# Patient Record
Sex: MALE | Race: WHITE | NOT HISPANIC OR LATINO | Employment: FULL TIME | URBAN - METROPOLITAN AREA
[De-identification: names, ages, dates, MRNs, and addresses within clinical notes are randomized per-mention and may not be internally consistent; named-entity substitution may affect disease eponyms.]

---

## 2018-04-11 ENCOUNTER — TELEPHONE (OUTPATIENT)
Dept: FAMILY MEDICINE | Facility: CLINIC | Age: 43
End: 2018-04-11

## 2018-07-02 ENCOUNTER — OFFICE VISIT (OUTPATIENT)
Dept: FAMILY MEDICINE | Facility: CLINIC | Age: 43
End: 2018-07-02
Payer: COMMERCIAL

## 2018-07-02 VITALS
OXYGEN SATURATION: 97 % | SYSTOLIC BLOOD PRESSURE: 113 MMHG | WEIGHT: 222 LBS | TEMPERATURE: 98.5 F | BODY MASS INDEX: 30.07 KG/M2 | DIASTOLIC BLOOD PRESSURE: 67 MMHG | HEART RATE: 69 BPM | HEIGHT: 72 IN

## 2018-07-02 DIAGNOSIS — M54.2 NECK PAIN: Primary | ICD-10-CM

## 2018-07-02 DIAGNOSIS — I86.1 BILATERAL VARICOCELES: ICD-10-CM

## 2018-07-02 PROCEDURE — 99214 OFFICE O/P EST MOD 30 MIN: CPT | Performed by: FAMILY MEDICINE

## 2018-07-02 RX ORDER — OMEPRAZOLE 40 MG/1
40 CAPSULE, DELAYED RELEASE ORAL
COMMUNITY
Start: 2015-03-24 | End: 2018-07-02

## 2018-07-02 RX ORDER — LISDEXAMFETAMINE DIMESYLATE 40 MG/1
40 CAPSULE ORAL EVERY MORNING
COMMUNITY
Start: 2017-02-14 | End: 2018-10-12 | Stop reason: SDUPTHER

## 2018-07-02 RX ORDER — SILDENAFIL 100 MG/1
100 TABLET, FILM COATED ORAL DAILY PRN
Qty: 5 TABLET | Refills: 0 | Status: SHIPPED | OUTPATIENT
Start: 2018-07-02 | End: 2018-07-12 | Stop reason: SDUPTHER

## 2018-07-02 ASSESSMENT — ENCOUNTER SYMPTOMS
ABDOMINAL PAIN: 0
DYSURIA: 0
COUGH: 0
CHILLS: 0
NAUSEA: 0
ADENOPATHY: 1
RHINORRHEA: 0
VOMITING: 0
FREQUENCY: 0
FEVER: 0
DIARRHEA: 1
HEMATURIA: 0
SORE THROAT: 0
SHORTNESS OF BREATH: 0
FATIGUE: 1

## 2018-07-02 ASSESSMENT — PAIN SCALES - GENERAL: PAINLEVEL: 3

## 2018-07-02 NOTE — PROGRESS NOTES
[facility] Medicine  [address] [place] [address]  [phone]       Reason for visit:   Chief Complaint   Patient presents with   • Groin Swelling     x 2 days   • Neck Swelling     x 1 week      HPI   Samuel Koehler is a 43 y.o. male who presents with swollen glands   - Swollen Glands  Noticed that his neck was swollen about 7-10 days ago  Left side  Feels like it has gotten a little bigger  Tender  Notes left ear ache  Denies URI symptoms, fevers, chills  Noticed swelling in groin about 2 days ago - scrotum  R side  Tender  Denies any urinary symptoms  One episode of diarrhea yesterday   History reviewed. No pertinent past medical history.  History reviewed. No pertinent surgical history.  Social History     Social History   • Marital status:      Spouse name: N/A   • Number of children: N/A   • Years of education: N/A     Occupational History   • Not on file.     Social History Main Topics   • Smoking status: Light Tobacco Smoker   • Smokeless tobacco: Never Used   • Alcohol use No   • Drug use: No   • Sexual activity: Not on file     Other Topics Concern   • Not on file     Social History Narrative    Do you wear your seatbelt? Yes    Do you have smoke detector in your home? Yes    Do you have a carbon monoxide detector in your home? 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          History reviewed. No pertinent family history.  Penicillins  Current Outpatient Prescriptions   Medication Sig Dispense Refill   • lisdexamfetamine (VYVANSE) 40 mg capsule Take 40 mg by mouth every morning.         No current facility-administered medications for this visit.        Review of Systems   Constitutional: Positive for fatigue. Negative for chills and fever.   HENT: Negative for congestion, rhinorrhea and sore throat.    Respiratory: Negative for cough and shortness of breath.    Gastrointestinal: Positive for diarrhea. Negative for  abdominal pain, nausea and vomiting.   Genitourinary: Positive for scrotal swelling and testicular pain. Negative for discharge, dysuria, frequency and hematuria.   Skin: Negative for rash.   Hematological: Positive for adenopathy.     Objective   Vitals:    07/02/18 1539   BP: 113/67   Pulse: 69   Temp: 36.9 °C (98.5 °F)   SpO2: 97%       Physical Exam   Constitutional: He appears well-developed and well-nourished.   HENT:   Right Ear: Tympanic membrane and ear canal normal.   Left Ear: Tympanic membrane and ear canal normal.   Nose: No mucosal edema or rhinorrhea. Right sinus exhibits no maxillary sinus tenderness. Left sinus exhibits no maxillary sinus tenderness.   Mouth/Throat: Oropharynx is clear and moist and mucous membranes are normal. Tonsils are 1+ on the right. Tonsils are 1+ on the left. No tonsillar exudate.   Neck: Normal range of motion.   Mild left neck tenderness under jaw line   Pulmonary/Chest: Effort normal and breath sounds normal. No respiratory distress. He has no decreased breath sounds. He has no wheezes. He has no rhonchi. He has no rales.   Abdominal: Hernia confirmed negative in the right inguinal area and confirmed negative in the left inguinal area.   Genitourinary: Penis normal. Right testis shows tenderness. Right testis shows no mass. Left testis shows no mass. Circumcised. No penile erythema or penile tenderness. No discharge found.   Genitourinary Comments: R > L varicocele   Lymphadenopathy:        Right cervical: No superficial cervical, no deep cervical and no posterior cervical adenopathy present.       Left cervical: No superficial cervical, no deep cervical and no posterior cervical adenopathy present.   Skin: He is not diaphoretic.       Procedures    Lab Results   Component Value Date    WBC 4.6 12/18/2015    HGB 13.6 12/18/2015    HCT 42.6 12/18/2015     12/18/2015    CHOL 145 12/18/2015    TRIG 54 12/18/2015    HDL 41 12/18/2015     (H) 12/18/2015    AST  305 (H) 12/18/2015     12/18/2015    K 4.8 12/18/2015     12/18/2015    CREATININE 0.90 12/18/2015    BUN 12 12/18/2015    CO2 23 12/18/2015    TSH 1.230 12/18/2015    HGBA1C 5.4 12/18/2015         Assessment   Problem List Items Addressed This Visit     None      Visit Diagnoses     Neck pain    -  Primary    New Problem  Noticed about a week ago  R sided only  Tender to touch  No URI sxs  No LNs on exam  Mild tenderness    Bilateral varicoceles        New Problem  x 2 days  R > L  Tender  Feels swollen  No other  symptoms  Varicoceles, R > L  U/S  Motrin      ED --> Rx for Viagra        Suresh Holland MD  7/2/2018

## 2018-07-02 NOTE — PATIENT INSTRUCTIONS
I did not feel any swollen lymph nodes in your neck  Could just be from overwork  Please let me know if your symptoms continue over the next 10-14 days  Please schedule your Ultrasound  Motrin/Tylenol for pain  Viagra as needed

## 2018-07-12 ENCOUNTER — TELEPHONE (OUTPATIENT)
Dept: FAMILY MEDICINE | Facility: CLINIC | Age: 43
End: 2018-07-12

## 2018-07-12 RX ORDER — SILDENAFIL 100 MG/1
100 TABLET, FILM COATED ORAL DAILY PRN
Qty: 30 TABLET | Refills: 0 | Status: SHIPPED | OUTPATIENT
Start: 2018-07-12 | End: 2019-03-21 | Stop reason: SDUPTHER

## 2018-10-12 ENCOUNTER — TELEPHONE (OUTPATIENT)
Dept: FAMILY MEDICINE | Facility: CLINIC | Age: 43
End: 2018-10-12

## 2018-10-12 RX ORDER — LISDEXAMFETAMINE DIMESYLATE 40 MG/1
40 CAPSULE ORAL EVERY MORNING
Qty: 6 CAPSULE | Refills: 0 | Status: SHIPPED | OUTPATIENT
Start: 2018-10-12 | End: 2020-01-10 | Stop reason: SDUPTHER

## 2018-10-12 NOTE — TELEPHONE ENCOUNTER
Patient is out of [facility]. His psychiatrist will not be back in the office until Thursday. Can you call him in a prescription for 6 pills?  If you need to talk to the psy office their number is [phone] and he spoke with Saskia. Call patient if this is ok. Send to MA

## 2019-03-21 RX ORDER — SILDENAFIL 100 MG/1
TABLET, FILM COATED ORAL
Qty: 30 TABLET | Refills: 0 | Status: SHIPPED | OUTPATIENT
Start: 2019-03-21 | End: 2023-04-25

## 2019-09-24 ENCOUNTER — TELEPHONE (OUTPATIENT)
Dept: FAMILY MEDICINE | Facility: CLINIC | Age: 44
End: 2019-09-24

## 2019-10-04 ENCOUNTER — OFFICE VISIT (OUTPATIENT)
Dept: FAMILY MEDICINE | Facility: CLINIC | Age: 44
End: 2019-10-04
Payer: COMMERCIAL

## 2019-10-04 VITALS
DIASTOLIC BLOOD PRESSURE: 67 MMHG | TEMPERATURE: 98.7 F | OXYGEN SATURATION: 97 % | BODY MASS INDEX: 29.8 KG/M2 | HEIGHT: 72 IN | WEIGHT: 220 LBS | HEART RATE: 60 BPM | SYSTOLIC BLOOD PRESSURE: 127 MMHG

## 2019-10-04 DIAGNOSIS — Z00.00 ENCOUNTER FOR GENERAL ADULT MEDICAL EXAMINATION WITHOUT ABNORMAL FINDINGS: Primary | ICD-10-CM

## 2019-10-04 PROBLEM — F90.9 ADHD: Status: ACTIVE | Noted: 2019-10-04

## 2019-10-04 PROCEDURE — 90471 IMMUNIZATION ADMIN: CPT | Performed by: FAMILY MEDICINE

## 2019-10-04 PROCEDURE — 90686 IIV4 VACC NO PRSV 0.5 ML IM: CPT | Performed by: FAMILY MEDICINE

## 2019-10-04 PROCEDURE — 99396 PREV VISIT EST AGE 40-64: CPT | Mod: 25 | Performed by: FAMILY MEDICINE

## 2019-10-04 ASSESSMENT — ENCOUNTER SYMPTOMS
BACK PAIN: 0
ABDOMINAL PAIN: 0
ADENOPATHY: 0
FEVER: 0
NUMBNESS: 0
RHINORRHEA: 0
FREQUENCY: 0
NAUSEA: 0
SHORTNESS OF BREATH: 0
CONSTIPATION: 0
PALPITATIONS: 0
SORE THROAT: 0
CHILLS: 0
DIZZINESS: 0
VOMITING: 0
DIARRHEA: 0
ARTHRALGIAS: 0
COUGH: 0
WEAKNESS: 0
BLOOD IN STOOL: 0
DYSURIA: 0

## 2019-10-04 NOTE — PROGRESS NOTES
[facility] Medicine  [address] [place] [id number]  [phone]       Reason for visit:   Chief Complaint   Patient presents with   • Annual Exam      HPI   Samuel Koehler is a 44 y.o. male who presents with CPX   Updates Yes Quit smoking a week ago - nicotine patch for a day. Cold turkey after that.    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     Social History Main Topics   • Smoking status: Former Smoker     Quit date: 9/25/2019   • Smokeless tobacco: Never Used   • Alcohol use Yes      Comment: few/mo   • Drug use: No   • Sexual activity: Yes     Partners: Female     Other Topics Concern   • Not on file     Social History Narrative    Do you wear your seatbelt? Yes    Do you have smoke detector in your home? Yes    Do you have a carbon monoxide detector in your home? 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          Family History   Problem Relation Age of Onset   • Diabetes Biological Father    • Heart disease Maternal Grandmother      Penicillins  Current Outpatient Prescriptions   Medication Sig Dispense Refill   • lisdexamfetamine (VYVANSE) 40 mg capsule Take 1 capsule (40 mg total) by mouth every morning. 6 capsule 0   • multivit-mins/iron/folic/lycop (CENTRUM MEN ORAL) Take 1 tablet by mouth daily.     • sildenafil (VIAGRA) 100 mg tablet TAKE 1 TABLET(100 MG) BY  MOUTH DAILY AS NEEDED FOR ERECTILE DYSFUNCTION (Patient not taking: Reported on 10/4/2019) 30 tablet 0     No current facility-administered medications for this visit.        Review of Systems   Constitutional: Negative for chills and fever.   HENT: Negative for congestion, rhinorrhea and sore throat.    Respiratory: Negative for cough and shortness of breath.    Cardiovascular: Negative for chest pain and palpitations.   Gastrointestinal: Negative for abdominal pain, blood in stool, constipation, diarrhea, nausea and vomiting.   Genitourinary: Negative for dysuria, frequency and testicular pain.   Musculoskeletal: Negative for arthralgias and back pain.   Skin: Negative for rash.   Allergic/Immunologic: Negative for environmental allergies and food allergies.   Neurological: Negative for dizziness, weakness and numbness.   Hematological: Negative for adenopathy.     Objective   Vitals:    10/04/19 1348   BP: 127/67   BP Location: Left upper arm   Patient Position: Sitting   Pulse: 60   Temp: 37.1 °C (98.7 °F)   TempSrc: Oral   SpO2: 97%   Weight: 99.8 kg (220 lb)   Height: 1.829 m (6')       Physical Exam   Constitutional: He is oriented to person, place, and time. He appears well-developed and well-nourished.   HENT:   Head: Normocephalic and atraumatic.   Right Ear: Tympanic membrane and ear canal normal.   Left Ear: Tympanic membrane and ear canal normal.   Nose: Nose normal.   Mouth/Throat: Oropharynx is clear and moist and mucous membranes are normal.   Eyes: Pupils are equal, round, and reactive to light. Conjunctivae are normal.   Neck: No thyroid mass and no thyromegaly present.   Cardiovascular: Normal rate, regular rhythm, S1 normal, S2 normal and normal pulses.  Exam reveals no gallop and no friction rub.    No murmur heard.  Pulses:       Radial pulses are 2+ on the right side, and 2+ on the left side.   Pulmonary/Chest: Effort normal and breath sounds normal. He has no wheezes. He has no rhonchi. He  has no rales.   Abdominal: Soft. Normal appearance and bowel sounds are normal. There is no tenderness. There is no rebound and no guarding.   Musculoskeletal: Normal range of motion.   Lymphadenopathy:     He has no cervical adenopathy.   Neurological: He is alert and oriented to person, place, and time. He has normal strength. No cranial nerve deficit.   Psychiatric: He has a normal mood and affect. His speech is normal and behavior is normal. Judgment normal. Cognition and memory are normal.   Nursing note and vitals reviewed.      Procedures    Lab Results   Component Value Date    WBC 4.6 12/18/2015    HGB 13.6 12/18/2015    HCT 42.6 12/18/2015     12/18/2015    CHOL 145 12/18/2015    TRIG 54 12/18/2015    HDL 41 12/18/2015     (H) 12/18/2015     (H) 12/18/2015     12/18/2015    K 4.8 12/18/2015     12/18/2015    CREATININE 0.90 12/18/2015    BUN 12 12/18/2015    CO2 23 12/18/2015    TSH 1.230 12/18/2015    HGBA1C 5.4 12/18/2015         Assessment   Problem List Items Addressed This Visit     None      Visit Diagnoses     Encounter for general adult medical examination without abnormal findings    -  Primary    Adult Male  Quit smoking  Eating better  Lifting weights, no cardio  Social EtOH  PMH ADHD  FHX D DM2; Mat GM CAD  Labs  Flu    Relevant Orders    CBC and Differential    Comprehensive metabolic panel    Lipid panel    Influenza vaccine quadrivalent preservative free 6 mon and older IM              Suresh Holland MD  10/4/2019

## 2019-10-04 NOTE — PATIENT INSTRUCTIONS
Diet - Try to limit portion sizes, take out, fast food, processed foods. Alcohol can be a expensive source of calories! If these are current problems for you, pick one area and focus on that first!    Exercise - Goal should be 30-40 minutes, 3-4 times a week. If you are currently not exercising, set reachable goals with short term deadlines!    Preventative Care Due - None    Labs Due Today - Yes    Shots Due Today - Flu    Follow up - 1 year or sooner if anything comes up    Make sure you are seeing your specialists, Eye Doctor, Foot Doctor, OBGYN yearly!

## 2020-01-10 ENCOUNTER — TELEPHONE (OUTPATIENT)
Dept: FAMILY MEDICINE | Facility: CLINIC | Age: 45
End: 2020-01-10

## 2020-01-10 RX ORDER — LISDEXAMFETAMINE DIMESYLATE 40 MG/1
40 CAPSULE ORAL EVERY MORNING
Qty: 8 CAPSULE | Refills: 0 | Status: SHIPPED | OUTPATIENT
Start: 2020-01-10 | End: 2021-03-11 | Stop reason: SDUPTHER

## 2020-01-10 RX ORDER — LISDEXAMFETAMINE DIMESYLATE 40 MG/1
40 CAPSULE ORAL EVERY MORNING
Qty: 4 CAPSULE | Refills: 0 | Status: SHIPPED | OUTPATIENT
Start: 2020-01-10 | End: 2020-01-10 | Stop reason: SDUPTHER

## 2020-01-10 NOTE — TELEPHONE ENCOUNTER
Patient walked into office. States that he is on vyvance that is prescribed form another doctor. They are not in their office today and he does not have an appt until 01/17/2020. He would like to know if  can call in 8 pills for him to last him until his upcoming appt with them.

## 2020-01-10 NOTE — TELEPHONE ENCOUNTER
Spoke to pt and told him sylvia chung. He said he wants to talk to .    He states they will not give him medication until he goes to the appt.

## 2020-01-15 ENCOUNTER — TELEPHONE (OUTPATIENT)
Dept: FAMILY MEDICINE | Facility: CLINIC | Age: 45
End: 2020-01-15

## 2020-01-15 DIAGNOSIS — Z00.00 ENCOUNTER FOR GENERAL ADULT MEDICAL EXAMINATION WITHOUT ABNORMAL FINDINGS: Primary | ICD-10-CM

## 2020-12-18 ENCOUNTER — TELEPHONE (OUTPATIENT)
Dept: FAMILY MEDICINE | Facility: CLINIC | Age: 45
End: 2020-12-18

## 2021-02-09 ENCOUNTER — TELEMEDICINE (OUTPATIENT)
Dept: FAMILY MEDICINE | Facility: CLINIC | Age: 46
End: 2021-02-09
Payer: COMMERCIAL

## 2021-02-09 DIAGNOSIS — R09.A2 GLOBUS SENSATION: Primary | ICD-10-CM

## 2021-02-09 PROCEDURE — 99213 OFFICE O/P EST LOW 20 MIN: CPT | Mod: 95 | Performed by: FAMILY MEDICINE

## 2021-02-09 ASSESSMENT — ENCOUNTER SYMPTOMS
FEVER: 0
SORE THROAT: 1
RHINORRHEA: 0
COUGH: 0
DIARRHEA: 0
NAUSEA: 0
VOICE CHANGE: 0
TROUBLE SWALLOWING: 0
SHORTNESS OF BREATH: 0
CHILLS: 0

## 2021-02-09 NOTE — PROGRESS NOTES
Verification of Patient Location:  The patient affirms they are currently located in the following state: [place]    Request for Consent:    Video Encounter   Geoffrey, my name is Suresh Holland MD.  Before we proceed, can you please verify your identification by telling me your full name and date of birth?  Can you tell me who is in the room with you?    You and I are about to have a telemedicine check-in or visit because you have requested it.  This is a live video-conference.  I am a real person, speaking to you in real time.  There is no one else with me on the video-conference.  However, when we use ([employer], [employer], etc) it is important for you to know that the video-conference may not be secure or private.  I am not recording this conversation and I am asking you not to record it.  This telemedicine visit will be billed to your health insurance or you, if you are self-insured.  You understand you will be responsible for any copayments or coinsurances that apply to your telemedicine visit.  Communication platform used for this encounter:  [employer]     Before starting our telemedicine visit, I am required to get your consent for this virtual check-in or visit by telemedicine. Do you consent?      Patient Response to Request for Consent:  Yes      Visit Documentation:  Subjective     Patient ID: Samuel Koehler is a 45 y.o. male.  1975      - Sore Throat  Has been feeling like there is something caught in his throat  Noticed it about 3 weeks ago  Has slowly improved  L side of throat feels sore for about 2 weeks  One episode of GERD over this time  No pain with swallowing currently      The following have been reviewed and updated as appropriate in this visit:  Tobacco  Allergies  Meds  Problems  Med Hx  Surg Hx  Fam Hx  Soc Hx        Review of Systems   Constitutional: Negative for chills and fever.   HENT: Positive for sore throat. Negative for congestion, postnasal drip, rhinorrhea, trouble  swallowing and voice change.    Respiratory: Negative for cough and shortness of breath.    Cardiovascular: Negative for chest pain.   Gastrointestinal: Negative for diarrhea and nausea.         Assessment/Plan   Diagnoses and all orders for this visit:    Globus sensation (Primary)  Comments:  New problem  Few weeks  Feels like something in throat  Has been improving  L sided throat discomfort  No issues eating, drinking, talking    tea with honey, salt water gargling.  Could be GERD related but notes only one episode over this time.  Will let me know if no improvement in 7-10     Time Spent:  I spent 8 minutes on this date of service performing the following activities: obtaining history, documenting and providing counseling and education.

## 2021-03-10 ENCOUNTER — TELEPHONE (OUTPATIENT)
Dept: FAMILY MEDICINE | Facility: CLINIC | Age: 46
End: 2021-03-10

## 2021-03-10 NOTE — TELEPHONE ENCOUNTER
OK THURSDAY    Pt ran out of Vyvanse 40 mg and his tele med was cancelled with them. He will be seeing psych April 1. Can you prescribe his Vyvanse in the mean time. I can call him

## 2021-03-11 RX ORDER — LISDEXAMFETAMINE DIMESYLATE 40 MG/1
40 CAPSULE ORAL EVERY MORNING
Qty: 30 CAPSULE | Refills: 0 | Status: SHIPPED | OUTPATIENT
Start: 2021-03-11 | End: 2023-04-25

## 2021-03-11 NOTE — TELEPHONE ENCOUNTER
Is he seeing a new Psych?  Dr. Drew should still prescribe the medication if he is still seeing him.

## 2021-03-11 NOTE — TELEPHONE ENCOUNTER
Dr Drew is prescribing the medication and if he is still seeing him, Dr Drew should be the one refilling it.  [facility] does not have him getting a refill since 12/20/20

## 2021-03-11 NOTE — TELEPHONE ENCOUNTER
I did speak with pt, he said Dr Drew left. I told him someone else should be able to fill that in Dr Drew's office and to try to call them again

## 2021-03-11 NOTE — TELEPHONE ENCOUNTER
Pt did call [facility] with no help there. He is quite frustrated with them and is asking if there is another practice he can go to. He is going without meds until 4/1

## 2022-05-23 ENCOUNTER — OFFICE VISIT (OUTPATIENT)
Dept: FAMILY MEDICINE | Facility: CLINIC | Age: 47
End: 2022-05-23
Payer: COMMERCIAL

## 2022-05-23 VITALS
BODY MASS INDEX: 31.07 KG/M2 | HEART RATE: 84 BPM | WEIGHT: 229.4 LBS | OXYGEN SATURATION: 98 % | HEIGHT: 72 IN | SYSTOLIC BLOOD PRESSURE: 116 MMHG | TEMPERATURE: 98.2 F | DIASTOLIC BLOOD PRESSURE: 84 MMHG

## 2022-05-23 DIAGNOSIS — R21 RASH: Primary | ICD-10-CM

## 2022-05-23 PROCEDURE — 99212 OFFICE O/P EST SF 10 MIN: CPT | Performed by: FAMILY MEDICINE

## 2022-05-23 PROCEDURE — 3008F BODY MASS INDEX DOCD: CPT | Performed by: FAMILY MEDICINE

## 2022-05-23 ASSESSMENT — ENCOUNTER SYMPTOMS
ACTIVITY CHANGE: 0
CHILLS: 0

## 2022-05-23 NOTE — PROGRESS NOTES
[address] [address]  [phone]       Reason for visit:   Chief Complaint   Patient presents with   • Illness      HPI   Samuel Koehler is a 47 y.o. male who presents with rash   - Rash  Couple weeks  Has gotten better the last week  On his chest, nipples  Itchy  Not painful  Used OTC Hydrocortisone - helped  Never had this before  Nothing similar anywhere else currently     Past Medical History:   Diagnosis Date   • ADHD 10/4/2019     Past Surgical History:   Procedure Laterality Date   • KNEE ARTHROSCOPY W/ MENISCECTOMY     • LIVER SURGERY       Social History     Socioeconomic History   • Marital status:      Spouse name: Not on file   • Number of children: Not on file   • Years of education: Not on file   • Highest education level: Not on file   Occupational History   • Not on file   Tobacco Use   • Smoking status: Former Smoker     Quit date: 2019     Years since quittin.6   • Smokeless tobacco: Never Used   Substance and Sexual Activity   • Alcohol use: Yes     Comment: few/mo   • Drug use: No   • Sexual activity: Yes     Partners: Female   Other Topics Concern   • Not on file   Social History Narrative    Do you wear your seatbelt? Yes    Do you have smoke detector in your home? Yes    Do you have a carbon monoxide detector in your home? 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      Social Determinants of Health     Financial Resource Strain: Not on file   Food Insecurity: Not on file   Transportation Needs: Not on file   Physical Activity: Not on file   Stress: Not on file   Social Connections: Not on file   Intimate Partner Violence: Not on file   Housing Stability: Not on file     Family History   Problem Relation Age of Onset   • Diabetes Biological Father    • Heart disease Maternal Grandmother      Penicillins  Current Outpatient Medications   Medication Sig Dispense  Refill   • lisdexamfetamine (VYVANSE) 40 mg capsule Take 1 capsule (40 mg total) by mouth every morning. 30 capsule 0   • multivit-mins/iron/folic/lycop (CENTRUM MEN ORAL) Take 1 tablet by mouth daily.     • sildenafil (VIAGRA) 100 mg tablet TAKE 1 TABLET(100 MG) BY MOUTH DAILY AS NEEDED FOR ERECTILE DYSFUNCTION 30 tablet 0     No current facility-administered medications for this visit.       Review of Systems   Constitutional: Negative for activity change and chills.   Skin: Positive for rash.     Objective   Vitals:    05/23/22 1309   BP: (!) 137/98   BP Location: Right upper arm   Patient Position: Sitting   Pulse: 84   Temp: 36.8 °C (98.2 °F)   TempSrc: Oral   SpO2: 98%   Weight: 104 kg (229 lb 6.4 oz)   Height: 1.829 m (6')       Physical Exam  Skin:     Findings: No rash.      Comments: Normal skin exam of chest   Neurological:      Mental Status: He is alert.         Procedures    Lab Results   Component Value Date    WBC 4.6 12/18/2015    HGB 13.6 12/18/2015    HCT 42.6 12/18/2015     12/18/2015    CHOL 145 12/18/2015    TRIG 54 12/18/2015    HDL 41 12/18/2015     (H) 12/18/2015     (H) 12/18/2015     12/18/2015    K 4.8 12/18/2015     12/18/2015    CREATININE 0.90 12/18/2015    BUN 12 12/18/2015    CO2 23 12/18/2015    TSH 1.230 12/18/2015    HGBA1C 5.4 12/18/2015         Assessment   Problem List Items Addressed This Visit    None     Visit Diagnoses     Rash    -  Primary    New Problem  Few weeks  Both breasts  Itchy  No Pain  Resolved  Lotion 1-2x/d  No steroid              Suresh Holland MD  5/23/2022

## 2022-09-15 ENCOUNTER — OFFICE VISIT (OUTPATIENT)
Dept: FAMILY MEDICINE | Facility: CLINIC | Age: 47
End: 2022-09-15
Payer: COMMERCIAL

## 2022-09-15 VITALS
TEMPERATURE: 98.7 F | HEIGHT: 72 IN | WEIGHT: 225.6 LBS | BODY MASS INDEX: 30.56 KG/M2 | HEART RATE: 68 BPM | SYSTOLIC BLOOD PRESSURE: 104 MMHG | DIASTOLIC BLOOD PRESSURE: 78 MMHG | OXYGEN SATURATION: 97 %

## 2022-09-15 DIAGNOSIS — M54.50 ACUTE BILATERAL LOW BACK PAIN WITHOUT SCIATICA: Primary | ICD-10-CM

## 2022-09-15 PROCEDURE — 3008F BODY MASS INDEX DOCD: CPT | Performed by: FAMILY MEDICINE

## 2022-09-15 PROCEDURE — 99213 OFFICE O/P EST LOW 20 MIN: CPT | Mod: 25 | Performed by: FAMILY MEDICINE

## 2022-09-15 PROCEDURE — 90686 IIV4 VACC NO PRSV 0.5 ML IM: CPT | Performed by: FAMILY MEDICINE

## 2022-09-15 PROCEDURE — 90471 IMMUNIZATION ADMIN: CPT | Performed by: FAMILY MEDICINE

## 2022-09-15 RX ORDER — CYCLOBENZAPRINE HCL 10 MG
10 TABLET ORAL NIGHTLY PRN
Qty: 15 TABLET | Refills: 0 | Status: SHIPPED | OUTPATIENT
Start: 2022-09-15 | End: 2022-10-26

## 2022-09-15 ASSESSMENT — ENCOUNTER SYMPTOMS
NUMBNESS: 0
DIFFICULTY URINATING: 0
ACTIVITY CHANGE: 1
BACK PAIN: 1
WEAKNESS: 0
CONSTIPATION: 0

## 2022-09-15 NOTE — PATIENT INSTRUCTIONS
We evaluated you today for back pain  Please take the medications that were prescribed as directed  If you were prescribed a steroid, you should avoid any NSAIDs such as Ibuprofen, Motrin, Alleve, Naproxen, Celebrex as these can cause GI issues like ulcers  If you were prescribed a muscle relaxer such as Flexeril, please beware that they can make you drowsy.  I recommend taking these only about 30 minutes before bedtime. It will help keep your muscles from tightening up while you sleep. You can take it during the day, but you should not drive or operate any machinery while using it. Do not mix with alcohol!  The worst thing that you can do for your back would be to do nothing. Be as active as you can tolerate. Even going for walks can help!  Heating pad - 20 minutes on, 20 minutes off when at rest. Even better than a heating pad is a hot tub if you have access!  Stretching - best to be done after heating pad. Do not overstretch! Just for a mild pull!  Please let me know if your symptoms change

## 2022-09-15 NOTE — PROGRESS NOTES
[address] [address]  [phone]       Reason for visit:   Chief Complaint   Patient presents with    sick visit      HPI   Samuel Koehler is a 47 y.o. male who presents with back pain   - Back Pain  Lifted up a soap box car  Pain started the following day  Went to Chiro 3 d ago  Lower back  Both sides  Was having trouble standing up straight - has gotten better  But has trouble with transitions  Taking Ibuprofen 600 every 8 hours  Sharper pain today  Doing HEP from Chiro  Denies numbness, weakness  Has used heat sparingly   Past Medical History:   Diagnosis Date    ADHD 10/4/2019     Past Surgical History:   Procedure Laterality Date    KNEE ARTHROSCOPY W/ MENISCECTOMY      LIVER SURGERY       Social History     Socioeconomic History    Marital status:      Spouse name: Not on file    Number of children: Not on file    Years of education: Not on file    Highest education level: Not on file   Occupational History    Not on file   Tobacco Use    Smoking status: Former Smoker     Quit date: 2019     Years since quittin.9    Smokeless tobacco: Never Used   Substance and Sexual Activity    Alcohol use: Yes     Comment: few/mo    Drug use: No    Sexual activity: Yes     Partners: Female   Other Topics Concern    Not on file   Social History Narrative    Do you wear your seatbelt? Yes    Do you have smoke detector in your home? Yes    Do you have a carbon monoxide detector in your home? 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      Social Determinants of Health     Financial Resource Strain: Not on file   Food Insecurity: Not on file   Transportation Needs: Not on file   Physical Activity: Not on file   Stress: Not on file   Social Connections: Not on file   Intimate Partner Violence: Not on file   Housing Stability: Not on file     Family History   Problem Relation Age of  Onset    Diabetes Biological Father     Heart disease Maternal Grandmother      Penicillins  Current Outpatient Medications   Medication Sig Dispense Refill    cyclobenzaprine (FLEXERIL) 10 mg tablet Take 1 tablet (10 mg total) by mouth nightly as needed for muscle spasms for up to 7 days. 15 tablet 0    lisdexamfetamine (VYVANSE) 40 mg capsule Take 1 capsule (40 mg total) by mouth every morning. (Patient taking differently: Take 20 mg by mouth every morning.) 30 capsule 0    multivit-mins/iron/folic/lycop (CENTRUM MEN ORAL) Take 1 tablet by mouth daily.      sildenafil (VIAGRA) 100 mg tablet TAKE 1 TABLET(100 MG) BY MOUTH DAILY AS NEEDED FOR ERECTILE DYSFUNCTION 30 tablet 0     No current facility-administered medications for this visit.       Review of Systems   Constitutional: Positive for activity change.   Gastrointestinal: Negative for constipation.   Genitourinary: Negative for difficulty urinating.   Musculoskeletal: Positive for back pain.   Neurological: Negative for weakness and numbness.     Objective   Vitals:    09/15/22 1053   BP: 104/78   BP Location: Left upper arm   Patient Position: Sitting   Pulse: 68   Temp: 37.1 °C (98.7 °F)   TempSrc: Oral   SpO2: 97%   Weight: 102 kg (225 lb 9.6 oz)   Height: 1.829 m (6')       Physical Exam  Vitals and nursing note reviewed.   Constitutional:       Appearance: He is well-developed.   Musculoskeletal:      Comments: Decreased ROM of lumbar spine in all planes of motion, worse with flexion  R sided > L sidedTTP along vertebrae or paraspinal muscles  Seated SLR negative b/l  5/5 strength and sensation b/l LEs  2+ reflexes patella         Procedures    Lab Results   Component Value Date    WBC 4.6 12/18/2015    HGB 13.6 12/18/2015    HCT 42.6 12/18/2015     12/18/2015    CHOL 145 12/18/2015    TRIG 54 12/18/2015    HDL 41 12/18/2015     (H) 12/18/2015     (H) 12/18/2015     12/18/2015    K 4.8 12/18/2015     12/18/2015     CREATININE 0.90 12/18/2015    BUN 12 12/18/2015    CO2 23 12/18/2015    TSH 1.230 12/18/2015    HGBA1C 5.4 12/18/2015         Assessment   Problem List Items Addressed This Visit    None     Visit Diagnoses     Acute bilateral low back pain without sciatica    -  Primary    New Problem  x 4 d  Lower back  Both sides  Worse with position changes  No radiation  No N/W  Dec ROM - flexion worst  Neg SLR  5/5 S/S LE b/l  Heat Stretch    Relevant Medications    cyclobenzaprine (FLEXERIL) 10 mg tablet      Flexeril  Ibuprofen  Chiro        Suresh Holland MD  9/15/2022

## 2022-10-26 ENCOUNTER — CONSULT (OUTPATIENT)
Dept: FAMILY MEDICINE | Facility: CLINIC | Age: 47
End: 2022-10-26
Payer: COMMERCIAL

## 2022-10-26 VITALS
DIASTOLIC BLOOD PRESSURE: 82 MMHG | BODY MASS INDEX: 31.26 KG/M2 | WEIGHT: 230.8 LBS | HEIGHT: 72 IN | HEART RATE: 73 BPM | SYSTOLIC BLOOD PRESSURE: 132 MMHG | OXYGEN SATURATION: 97 % | TEMPERATURE: 98 F

## 2022-10-26 DIAGNOSIS — Z01.818 PRE-OPERATIVE CLEARANCE: ICD-10-CM

## 2022-10-26 DIAGNOSIS — R01.1 CARDIAC MURMUR: ICD-10-CM

## 2022-10-26 DIAGNOSIS — F90.9 ATTENTION DEFICIT HYPERACTIVITY DISORDER (ADHD), UNSPECIFIED ADHD TYPE: ICD-10-CM

## 2022-10-26 DIAGNOSIS — H25.9 AGE-RELATED CATARACT OF BOTH EYES, UNSPECIFIED AGE-RELATED CATARACT TYPE: Primary | ICD-10-CM

## 2022-10-26 PROCEDURE — 3008F BODY MASS INDEX DOCD: CPT | Performed by: FAMILY MEDICINE

## 2022-10-26 PROCEDURE — 99213 OFFICE O/P EST LOW 20 MIN: CPT | Performed by: FAMILY MEDICINE

## 2022-10-26 ASSESSMENT — ENCOUNTER SYMPTOMS
RHINORRHEA: 0
FEVER: 0
DIARRHEA: 0
SORE THROAT: 0
BLOOD IN STOOL: 0
SHORTNESS OF BREATH: 0
ARTHRALGIAS: 0
ABDOMINAL PAIN: 0
COUGH: 0
NAUSEA: 0
DIZZINESS: 0
CHILLS: 0
PALPITATIONS: 0
VOMITING: 0
FREQUENCY: 0
NUMBNESS: 0
BACK PAIN: 0
WEAKNESS: 0
ACTIVITY CHANGE: 0
DYSURIA: 0
CONSTIPATION: 0
ADENOPATHY: 0

## 2022-10-26 NOTE — PATIENT INSTRUCTIONS
You are approved for surgery!    I will complete the paperwork that needs to be done and fax it along with bloodwork and EKG results, if they were needed.    Make sure you read all of your pre-op instructions from your surgeon, especially the night before and day of surgery information.    Please do not take any Aspirin, Vitamin E, or Fish Oil within 10 days of procedure unless cleared by surgeon. These can increase bleeding.    Please do not take any Alleve/Motrin/Ibuprofen/Naproxen or similar anti-inflammatories within 7-10 days of the surgery unless cleared by surgeon. These can also increase bleeding.  If you need to take anything for pain before the surgery, Tylenol is best!    Medications to stop before procedure: Hold Vyvanse tomorrow for your BP

## 2022-10-26 NOTE — PROGRESS NOTES
[address] [address]  [phone]       Reason for visit:   Chief Complaint   Patient presents with    Pre-op Exam      HPI   Samuel Koehler is a 47 y.o. male who presents for a pre-operative clearance exam   Procedure:Right Cataract Removal; Left Cataract Removal  Date: 10/27/22  Surgeon: Dr. Hester  Location: [facility]  Hx of Anesthesia: Yes Tolerates well  Taking Aspirin: No   Taking NSAIDS: Yes PRN - none recently  Taking Blood Thinner No     Chronic Medical Problems:  - ADHD ---> Takes Vyvanse from Psych. Stable.    Past Medical History:   Diagnosis Date    ADHD 10/4/2019     Past Surgical History:   Procedure Laterality Date    KNEE ARTHROSCOPY W/ MENISCECTOMY Right     LIVER SURGERY       Social History     Socioeconomic History    Marital status:      Spouse name: Not on file    Number of children: Not on file    Years of education: Not on file    Highest education level: Not on file   Occupational History    Not on file   Tobacco Use    Smoking status: Former     Types: Cigarettes     Quit date: 9/25/2019     Years since quitting: 3.0    Smokeless tobacco: Never   Substance and Sexual Activity    Alcohol use: Yes     Comment: few/mo    Drug use: No    Sexual activity: Yes     Partners: Female   Other Topics Concern    Not on file   Social History Narrative    Do you wear your seatbelt? Yes    Do you have smoke detector in your home? Yes    Do you have a carbon monoxide detector in your home? 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      Social Determinants of Health     Financial Resource Strain: Not on file   Food Insecurity: Not on file   Transportation Needs: Not on file   Physical Activity: Not on file   Stress: Not on file   Social Connections: Not on file   Intimate Partner Violence: Not on file   Housing Stability: Not on file     Family History    Problem Relation Age of Onset    Diabetes Biological Father     Heart disease Maternal Grandmother      Penicillins  Current Outpatient Medications   Medication Sig Dispense Refill    lisdexamfetamine (VYVANSE) 40 mg capsule Take 1 capsule (40 mg total) by mouth every morning. (Patient taking differently: Take 20 mg by mouth every morning.) 30 capsule 0    multivit-mins/iron/folic/lycop (CENTRUM MEN ORAL) Take 1 tablet by mouth daily.      sildenafil (VIAGRA) 100 mg tablet TAKE 1 TABLET(100 MG) BY MOUTH DAILY AS NEEDED FOR ERECTILE DYSFUNCTION 30 tablet 0     No current facility-administered medications for this visit.       Review of Systems   Constitutional: Negative for activity change, chills and fever.   HENT: Negative for congestion, hearing loss, rhinorrhea and sore throat.    Eyes: Negative for visual disturbance.   Respiratory: Negative for cough and shortness of breath.    Cardiovascular: Negative for chest pain and palpitations.   Gastrointestinal: Negative for abdominal pain, blood in stool, constipation, diarrhea, nausea and vomiting.   Genitourinary: Negative for dysuria, frequency and testicular pain.   Musculoskeletal: Negative for arthralgias and back pain.   Skin: Negative for rash.   Allergic/Immunologic: Negative for environmental allergies and food allergies.   Neurological: Negative for dizziness, weakness and numbness.   Hematological: Negative for adenopathy.     Objective   Vitals:    10/26/22 0924 10/26/22 0940   BP: (!) 158/83 132/82   BP Location: Left upper arm Right upper arm   Patient Position: Sitting Sitting   Pulse: 73    Temp: 36.7 °C (98 °F)    TempSrc: Oral    SpO2: 97%    Weight: 105 kg (230 lb 12.8 oz)    Height: 1.829 m (6')        Physical Exam  Vitals and nursing note reviewed.   Constitutional:       Appearance: Normal appearance. He is well-developed and well-nourished.   HENT:      Head: Normocephalic and atraumatic.      Right Ear: Tympanic membrane and ear canal  normal.      Left Ear: Tympanic membrane and ear canal normal.      Nose: Nose normal.      Mouth/Throat:      Mouth: Oropharynx is clear and moist and mucous membranes are normal.   Eyes:      Conjunctiva/sclera: Conjunctivae normal.      Pupils: Pupils are equal, round, and reactive to light.   Neck:      Thyroid: No thyroid mass or thyromegaly.   Cardiovascular:      Rate and Rhythm: Normal rate and regular rhythm.      Pulses: Normal pulses.           Radial pulses are 2+ on the right side and 2+ on the left side.      Heart sounds: S1 normal and S2 normal. Murmur (3/6 RODRIGO RUSB and mid ax line) heard.     No friction rub. No gallop.   Pulmonary:      Effort: Pulmonary effort is normal.      Breath sounds: Normal breath sounds. No wheezing, rhonchi or rales.   Abdominal:      General: Bowel sounds are normal.      Palpations: Abdomen is soft.      Tenderness: There is no abdominal tenderness. There is no guarding or rebound.   Musculoskeletal:         General: Normal range of motion.   Lymphadenopathy:      Cervical: No cervical adenopathy.   Neurological:      Mental Status: He is alert and oriented to person, place, and time.      Cranial Nerves: No cranial nerve deficit.      Motor: Motor strength is normal.   Psychiatric:         Mood and Affect: Mood and affect normal.         Speech: Speech normal.         Behavior: Behavior normal.         Cognition and Memory: Cognition and memory normal.         Judgment: Judgment normal.         Procedures    Lab Results   Component Value Date    WBC 4.6 12/18/2015    HGB 13.6 12/18/2015    HCT 42.6 12/18/2015     12/18/2015    CHOL 145 12/18/2015    TRIG 54 12/18/2015    HDL 41 12/18/2015     (H) 12/18/2015     (H) 12/18/2015     12/18/2015    K 4.8 12/18/2015     12/18/2015    CREATININE 0.90 12/18/2015    BUN 12 12/18/2015    CO2 23 12/18/2015    TSH 1.230 12/18/2015    HGBA1C 5.4 12/18/2015         Assessment   Problem List Items  Addressed This Visit        Mental Health    ADHD     Chronic  Stable  Controlled  Sees Psych  Continue with Vyvanse  Will hold tomorrow in case of elevated BP        Other Visit Diagnoses     Age-related cataract of both eyes, unspecified age-related cataract type    -  Primary    New Problem  Both eyes      Pre-operative clearance        B/L Cataract  Tolerates Anesthesia  PMH ADHD  Rare NSAID  ROS VS WNL  PE 3/6 RODRIGO RUSB/Mid Ax --- ECHO  No labs, EKG  Medically cleared for low risk sx    Cardiac murmur        New Problem  Levy on exam today  Denies CP, SOB, dizziness  Denies any physical limitations  ECHO ordered    Relevant Orders    Transthoracic echo (TTE) complete              Suresh Holland MD  10/26/2022

## 2022-10-26 NOTE — ASSESSMENT & PLAN NOTE
Chronic  Stable  Controlled  Sees Psych  Continue with Vyvanse  Will hold tomorrow in case of elevated BP

## 2022-11-02 ENCOUNTER — TELEPHONE (OUTPATIENT)
Dept: FAMILY MEDICINE | Facility: CLINIC | Age: 47
End: 2022-11-02
Payer: COMMERCIAL

## 2023-04-24 LAB
INR PPP: 1.6
PROTHROMBIN TIME: 15.8 SECONDS

## 2023-04-25 ENCOUNTER — OFFICE VISIT (OUTPATIENT)
Dept: FAMILY MEDICINE | Facility: CLINIC | Age: 48
End: 2023-04-25
Payer: COMMERCIAL

## 2023-04-25 ENCOUNTER — TELEPHONE (OUTPATIENT)
Dept: CARDIOLOGY | Facility: CLINIC | Age: 48
End: 2023-04-25
Payer: COMMERCIAL

## 2023-04-25 VITALS
DIASTOLIC BLOOD PRESSURE: 82 MMHG | HEIGHT: 72 IN | BODY MASS INDEX: 31.34 KG/M2 | TEMPERATURE: 97.6 F | WEIGHT: 231.4 LBS | HEART RATE: 88 BPM | OXYGEN SATURATION: 97 % | SYSTOLIC BLOOD PRESSURE: 125 MMHG

## 2023-04-25 DIAGNOSIS — I27.20 PULMONARY HYPERTENSION (CMS/HCC): ICD-10-CM

## 2023-04-25 DIAGNOSIS — Q21.12 PFO (PATENT FORAMEN OVALE): ICD-10-CM

## 2023-04-25 DIAGNOSIS — Z79.01 WARFARIN ANTICOAGULATION: ICD-10-CM

## 2023-04-25 DIAGNOSIS — Z95.2 HISTORY OF MITRAL VALVE REPLACEMENT WITH MECHANICAL VALVE: ICD-10-CM

## 2023-04-25 DIAGNOSIS — I34.0 NONRHEUMATIC MITRAL VALVE REGURGITATION: Primary | ICD-10-CM

## 2023-04-25 PROCEDURE — 99214 OFFICE O/P EST MOD 30 MIN: CPT | Performed by: FAMILY MEDICINE

## 2023-04-25 PROCEDURE — 3008F BODY MASS INDEX DOCD: CPT | Performed by: FAMILY MEDICINE

## 2023-04-25 PROCEDURE — 1111F DSCHRG MED/CURRENT MED MERGE: CPT | Performed by: FAMILY MEDICINE

## 2023-04-25 RX ORDER — ATORVASTATIN CALCIUM 40 MG/1
40 TABLET, FILM COATED ORAL
COMMUNITY
Start: 2023-03-24 | End: 2023-09-11 | Stop reason: SDUPTHER

## 2023-04-25 RX ORDER — METOPROLOL SUCCINATE 25 MG/1
25 TABLET, EXTENDED RELEASE ORAL
COMMUNITY
Start: 2023-03-24 | End: 2023-08-21 | Stop reason: SDUPTHER

## 2023-04-25 RX ORDER — WARFARIN SODIUM 5 MG/1
TABLET ORAL
COMMUNITY
Start: 2023-04-20 | End: 2023-09-11 | Stop reason: SDUPTHER

## 2023-04-25 RX ORDER — NAPROXEN SODIUM 220 MG/1
81 TABLET, FILM COATED ORAL
COMMUNITY
Start: 2023-03-24 | End: 2023-10-04

## 2023-04-25 RX ORDER — ALPRAZOLAM 0.5 MG/1
0.5 TABLET ORAL 3 TIMES DAILY PRN
COMMUNITY
Start: 2023-03-24 | End: 2023-10-05

## 2023-04-25 RX ORDER — ACETAMINOPHEN 325 MG/1
975 TABLET ORAL
COMMUNITY
Start: 2023-03-24 | End: 2024-10-15 | Stop reason: ALTCHOICE

## 2023-04-25 ASSESSMENT — ENCOUNTER SYMPTOMS
FEVER: 0
LIGHT-HEADEDNESS: 0
SPEECH DIFFICULTY: 0
ABDOMINAL PAIN: 0
SHORTNESS OF BREATH: 0
PALPITATIONS: 0
HEMATURIA: 0
HEADACHES: 0
CHEST TIGHTNESS: 0
NAUSEA: 0
SEIZURES: 0
DIZZINESS: 0
NUMBNESS: 0
BLOOD IN STOOL: 0
WEAKNESS: 0
ACTIVITY CHANGE: 1
COUGH: 0
CHILLS: 0

## 2023-04-25 ASSESSMENT — PAIN SCALES - GENERAL: PAINLEVEL: 2

## 2023-04-25 NOTE — TELEPHONE ENCOUNTER
[facility] care - was seeing Dr. Mendoza [facility]. Referred by Dr. Holland.    ..New Patient Visit Questionnaire  Use the F2 key to move through the asterisks.  Reason for appointment? Establish care     Who referred you: Dr. Holland    Name of primary care physician Dr. Holland    Has the patient ever been seen by a cardiologist before?  Yes - Dr. Mendoza, [facility]                  If YES, please obtain name and location of previous cardiologist.  Do you give us permission to obtain Medical records from the Providers listed? yes    Have you had any recent lab work performed? No    Have you ever had any cardiac testing (echo, stress test, carotid or aortic ultrasounds, cardiac MRI, etc.) yes req'd records    Have you ever had a cardiac catheterization, angioplasty, stent or heart surgery?  req'd records    Have you had any recent hospitalizations?  no    If the patient has had previous testing/hospitalization, please determine where and when this was performed.  If the patient has copies of these reports or discharge summaries, please instruct them bring records to the visit.     PATIENT INSTRUCTIONS   Please bring a list of all your medications with the name of the medication, the dosage and how frequently you take the medication.  If you do not have a list, please bring all of your medication bottles with you.

## 2023-04-25 NOTE — PROGRESS NOTES
[facility] IN [place]       Reason for visit: Hospital Discharge Follow-up    HPI:  Samuel Koehler is a 47 y.o. male presenting for follow-up after hospital discharge.    Discharge Diagnosis: Ruptured Chordae Tendineae, CHF, Pulmonary Hypertension, Mitral Valve Prolapse, PFO  Discharging Facility: [facility]  Date of Admission: 3/16/23  Date of Discharge: 3/24/23    Summary of Hospital Course: He went to the ER for progression of SOB and chest pain that was going on for about 2 weeks.  He had a cough that day that had blood in it and he went to  and was told to go to ER.  CXR showed possible pneumonia with patchy infiltrate. ECHO showed ruptured posterior cord structures and flair psoterior mitral leaflet and severe MR.  Admitted and seen by CT Surgery.  Had MVR on 3/20/23 and was weaned off ventilator and medications. Started on Aspirin and Coumadin, statin, Toprol XL.  Started Cardiac rehab and was discharged home.      Medications prescribed at discharge reconciled with current ambulatory medication list: Yes.    Inpatient testing (labs, imaging, cardiovascular) requiring follow-up: None    History since hospital discharge: He is feeling great. He is about 70% to baseline.  He has seen Cardiology and CT Surgery. Home nursing has ended. PT came out a couple times and has finished.  He is taking Toprol XL, Atorvastatin 40, ASA 81 and Warfarin 5 daily.  INR had been between 2.5-3 but INR yesterday was 1.6. Denies chest pain, palpitations, chest pain, dizziness. Scheduled to see CT Surgery in 10 days. Would like to see another Cardiologist.        Advance Care Planning Documents     There are no Advance Care Planning documents on file.          Patient Active Problem List   Diagnosis   • Family history of ischemic heart disease   • ADHD   • Mitral valvular regurgitation   • History of mitral valve replacement with mechanical valve   • Warfarin anticoagulation   • PFO (patent foramen  oscar)   • Pulmonary hypertension (CMS/HCC)     Past Medical History:   Diagnosis Date   • ADHD 10/4/2019     Past Surgical History:   Procedure Laterality Date   • CARDIAC VALVE SURGERY     • KNEE ARTHROSCOPY W/ MENISCECTOMY Right    • LIVER SURGERY     • MITRAL VALVE REPLACEMENT Left 03/20/2012    left atrial appendage clip placement      Social History     Socioeconomic History   • Marital status:      Spouse name: Not on file   • Number of children: Not on file   • Years of education: Not on file   • Highest education level: Not on file   Occupational History   • Not on file   Tobacco Use   • Smoking status: Former     Types: Cigarettes     Quit date: 9/25/2019     Years since quitting: 3.5   • Smokeless tobacco: Never   Vaping Use   • Vaping status: Not on file   Substance and Sexual Activity   • Alcohol use: Yes     Comment: few/mo   • Drug use: No   • Sexual activity: Yes     Partners: Female   Other Topics Concern   • Not on file   Social History Narrative    Do you wear your seatbelt? Yes    Do you have smoke detector in your home? Yes    Do you have a carbon monoxide detector in your home? 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      Social Determinants of Health     Financial Resource Strain: Not on file   Food Insecurity: Not on file   Transportation Needs: Not on file   Physical Activity: Not on file   Stress: Not on file   Social Connections: Not on file   Intimate Partner Violence: Not on file   Housing Stability: Not on file     Family History   Problem Relation Age of Onset   • Diabetes Biological Father    • Heart disease Maternal Grandmother      Penicillins  Current Outpatient Medications   Medication Sig Dispense Refill   • acetaminophen (TYLENOL) 325 mg tablet 975 mg.     • ALPRAZolam (XANAX) 0.5 mg tablet Take 0.5 mg by mouth 3 (three) times a day as needed. for anxiety     • aspirin 81 mg chewable tablet 81 mg.     • atorvastatin  (LIPITOR) 40 mg tablet 40 mg.     • metoprolol succinate XL (TOPROL-XL) 25 mg 24 hr tablet 25 mg.     • warfarin (COUMADIN) 5 mg tablet Monday and Friday 10 MG   TWTHSS 5 MG       No current facility-administered medications for this visit.       ROS:  Review of Systems   Constitutional: Positive for activity change. Negative for chills and fever.   Respiratory: Negative for cough, chest tightness and shortness of breath.    Cardiovascular: Negative for chest pain, palpitations and leg swelling.   Gastrointestinal: Negative for abdominal pain, blood in stool and nausea.   Genitourinary: Negative for hematuria.   Neurological: Negative for dizziness, seizures, syncope, speech difficulty, weakness, light-headedness, numbness and headaches.       Vitals:    04/25/23 1409   BP: 125/82   BP Location: Left upper arm   Patient Position: Sitting   Pulse: 88   Temp: 36.4 °C (97.6 °F)   TempSrc: Oral   SpO2: 97%   Weight: 105 kg (231 lb 6.4 oz)   Height: 1.829 m (6')       EXAM:  Physical Exam  Vitals reviewed.   Constitutional:       Appearance: He is well-developed.   Cardiovascular:      Rate and Rhythm: Normal rate and regular rhythm.      Heart sounds: Murmur (mid axillary line) heard.      No friction rub. No gallop.   Pulmonary:      Effort: Pulmonary effort is normal.      Breath sounds: Normal breath sounds. No wheezing or rales.   Musculoskeletal:      Right lower leg: No edema.      Left lower leg: No edema.   Neurological:      Mental Status: He is alert and oriented to person, place, and time.         Procedures    Lab Results   Component Value Date    WBC 4.6 12/18/2015    HGB 13.6 12/18/2015    HCT 42.6 12/18/2015     12/18/2015    CHOL 145 12/18/2015    TRIG 54 12/18/2015    HDL 41 12/18/2015     (H) 12/18/2015     (H) 12/18/2015     12/18/2015    K 4.8 12/18/2015     12/18/2015    CREATININE 0.90 12/18/2015    BUN 12 12/18/2015    CO2 23 12/18/2015    TSH 1.230 12/18/2015     HGBA1C 5.4 12/18/2015         ASSESSMENT/PLAN:  Diagnoses and all orders for this visit:    Nonrheumatic mitral valve regurgitation (Primary)  Assessment & Plan:  New problem  S/p mechanical repair  On Coumadin  Sees CT Sx, Cardiology  Switching to Monty  On Toprol, ASA as well  INR yesterday 1.6  Managed by Cardiology  Denies CP, SOB  Happy with recovery    Orders:  -     Ambulatory referral to Cardiology; Future    History of mitral valve replacement with mechanical valve  Assessment & Plan:  New problem  Done 3/20/23 at [facility]    Orders:  -     Ambulatory referral to Cardiology; Future    PFO (patent foramen ovale)  Assessment & Plan:  New Problem    Orders:  -     Ambulatory referral to Cardiology; Future    Pulmonary hypertension (CMS/HCC)  Assessment & Plan:  New Problem  Seeing Cardiology  BP controlled  Continue with Toprol XL    Orders:  -     Ambulatory referral to Cardiology; Future    Warfarin anticoagulation  -     Ambulatory referral to Cardiology; Future        Suresh Holland MD  4/25/2023

## 2023-04-25 NOTE — ASSESSMENT & PLAN NOTE
New problem  S/p mechanical repair  On Coumadin  Sees CT Sx, Cardiology  Switching to Monty  On Toprol, ASA as well  INR yesterday 1.6  Managed by Cardiology  Denies CP, SOB  Happy with recovery

## 2023-04-25 NOTE — PATIENT INSTRUCTIONS
Continue with all medications  See Surgeon and Cardiologist next week  Referral for new Cardiologist placed today

## 2023-06-06 ENCOUNTER — OFFICE VISIT (OUTPATIENT)
Dept: CARDIOLOGY | Facility: CLINIC | Age: 48
End: 2023-06-06
Payer: COMMERCIAL

## 2023-06-06 VITALS
RESPIRATION RATE: 20 BRPM | WEIGHT: 238.4 LBS | SYSTOLIC BLOOD PRESSURE: 126 MMHG | HEIGHT: 72 IN | HEART RATE: 85 BPM | DIASTOLIC BLOOD PRESSURE: 80 MMHG | BODY MASS INDEX: 32.29 KG/M2

## 2023-06-06 DIAGNOSIS — E78.2 MIXED HYPERLIPIDEMIA: ICD-10-CM

## 2023-06-06 DIAGNOSIS — Z79.01 WARFARIN ANTICOAGULATION: ICD-10-CM

## 2023-06-06 DIAGNOSIS — Z95.2 HISTORY OF MITRAL VALVE REPLACEMENT WITH MECHANICAL VALVE: Primary | ICD-10-CM

## 2023-06-06 PROCEDURE — 3008F BODY MASS INDEX DOCD: CPT | Performed by: INTERNAL MEDICINE

## 2023-06-06 PROCEDURE — 99204 OFFICE O/P NEW MOD 45 MIN: CPT | Performed by: INTERNAL MEDICINE

## 2023-06-06 PROCEDURE — 93000 ELECTROCARDIOGRAM COMPLETE: CPT | Performed by: INTERNAL MEDICINE

## 2023-06-06 RX ORDER — CLINDAMYCIN HYDROCHLORIDE 150 MG/1
150 CAPSULE ORAL SEE ADMIN INSTRUCTIONS
Qty: 4 CAPSULE | Refills: 6 | Status: SHIPPED | OUTPATIENT
Start: 2023-06-06 | End: 2024-08-06

## 2023-06-06 ASSESSMENT — ENCOUNTER SYMPTOMS
ORTHOPNEA: 0
DYSPNEA ON EXERTION: 1
PALPITATIONS: 0
NEAR-SYNCOPE: 0
IRREGULAR HEARTBEAT: 0
PND: 0
SYNCOPE: 0

## 2023-06-06 NOTE — PROGRESS NOTES
Cardiology Consult/New Patient    Reason for visit: Status post mitral valve replacement with mechanical valve.    DADA Male presents today for evaluation.  He is a 48-year-old male with a history of smoking, hypertension, hyperlipidemia who was told he should have an echocardiogram because of his heart murmur but never had it done.  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  His echocardiogram at that time showed severe mitral regurgitation.  He tells me that he had undergone cardiac catheterization and did not show any significant coronary disease.    Alfonzo had mitral valve replacement with a mechanical mitral valve and left atrial appendage clip..  This will require lifelong anticoagulation with warfarin.    Since his discharge he has been doing well and is scheduled to start cardiac rehab.  He does feel fatigued at times.  There has not been any chest discomfort, shortness of breath, lower extremity edema, PND or orthopnea.  He has not had any neurological or TIA-like symptoms.    There has not been any bleeding issues related to his anticoagulation with warfarin.  His most recent INR was 2.3.  INR goal is 2.5-3.5 given his mechanical valve.    Alfonzo has a history of mild hypertension.  There is no diabetes.  He has hyperlipidemia.  He is a former smoker having quit 3-1/2 years ago.  He has a family history of coronary artery disease with his father but later in life.    Medical History:   Past Medical History:   Diagnosis Date   • ADHD 10/4/2019       Surgical History:   Past Surgical History:   Procedure Laterality Date   • CARDIAC VALVE SURGERY     • KNEE ARTHROSCOPY W/ MENISCECTOMY Right    • LIVER SURGERY     • MITRAL VALVE REPLACEMENT Left 03/20/2023    left atrial appendage clip placement        Family History:   Family History   Problem Relation Age of Onset   • Heart disease Biological  Father    • Diabetes Biological Father    • Heart disease Maternal Grandmother         Social History:    Social History     Tobacco Use   • Smoking status: Former     Types: Cigarettes     Quit date: 9/25/2019     Years since quitting: 3.7   • Smokeless tobacco: Never   Substance Use Topics   • Alcohol use: Yes     Comment: few/mo   • Drug use: No        Allergies: Penicillins    Current Outpatient Medications   Medication Instructions   • acetaminophen (TYLENOL) 975 mg   • ALPRAZolam (XANAX) 0.5 mg, oral, 3 times daily PRN, for anxiety   • aspirin 81 mg   • atorvastatin (LIPITOR) 40 mg   • clindamycin (CLEOCIN HCL) 150 mg, oral, See admin instructions, Take 4 tablets ( 600mg ) one hour prior to dentist   • metoprolol succinate XL (TOPROL-XL) 25 mg   • warfarin (COUMADIN) 5 mg tablet Monday and Friday 10 MG   TWTHSS 5 MG         Review of Systems  Review of Systems   Cardiovascular: Positive for dyspnea on exertion. Negative for chest pain, irregular heartbeat, leg swelling, near-syncope, orthopnea, palpitations, paroxysmal nocturnal dyspnea and syncope.       Objective     Vitals:    06/06/23 1314   BP: 126/80   Pulse: 85   Resp: 20       Physical Exam  Constitutional:       Appearance: He is well-developed.   Neck:      Vascular: No carotid bruit or JVD.   Cardiovascular:      Rate and Rhythm: Normal rate and regular rhythm.      Pulses:           Carotid pulses are 2+ on the right side and 2+ on the left side.       Dorsalis pedis pulses are 2+ on the right side and 2+ on the left side.        Posterior tibial pulses are 2+ on the right side and 2+ on the left side.      Heart sounds: S1 normal and S2 normal. No murmur heard.     No friction rub. No gallop.      Comments: CRISP valve sounds.  No mitral insufficiency audible.  Pulmonary:      Effort: Pulmonary effort is normal.      Breath sounds: Normal breath sounds.   Musculoskeletal:      Right lower leg: No edema.      Left lower leg: No edema.   Skin:      General: Skin is warm and dry.   Neurological:      Mental Status: He is alert.   Psychiatric:         Behavior: Behavior normal.              ECG.  Normal sinus rhythm, nonspecific T wave abnormality.      Assessment/Plan     History of mitral valve replacement with mechanical valve  I will arrange for Alfonzo to start having his INR monitored in our Coumadin clinic.  Our goal will be 2.5-3.5.  He will continue on the aspirin daily.    I emphasized to him the need to make sure that he does not miss any of his warfarin doses and that he is diligent about getting his follow-up INR.  We will also check with his insurance about getting a home monitor.  I also discussed with him about avoiding vitamin K containing foods and he had been given information about this at the hospital.    I sent a prescription for the clindamycin into the pharmacy for him so that he has it available.    I also discussed with Alfonzo the need for SBE prophylaxis prior to going to the dentist.  He is allergic to penicillin and will take clindamycin 600 mg 1 hour prior to going to the dentist for any reason including cleanings and x-rays.    Mixed hyperlipidemia  Alfonzo will continue on the atorvastatin 40 mg daily.  I will have him get a follow-up lipid profile and CMP after being on the medication for 3 months.    I discussed with him the need for diet, exercise and weight loss.  He has a BMI of over 30.    Warfarin anticoagulation  I explained to Alfonzo that he will need lifelong anticoagulation given his mechanical valve.  INR goal of 2.5-3.5.  We will arrange for him to start having his INR checked in our Coumadin clinic as well as getting a home monitor.    We had a prolonged discussion about these complex clinical issues and went over the various important aspects to consider. All questions were answered.      Alfonzo will be coming due for his day-to-day care.  I have asked him to return to see me in the fall.  He will call with any questions or  concerns in the interim.  I will continue to keep you informed of his progress.    If you have any questions, or need any further information, please do not hesitate to contact me.    I spent 45 minutes on this date of service performing the following activities: obtaining history, performing examination, entering orders, documenting, preparing for visit, obtaining / reviewing records, providing counseling and education and communicating results.         Luis Antonio Millan MD  6/7/2023

## 2023-06-06 NOTE — LETTER
June 7, 2023     Suresh Holland MD  [address]  [facility] [address]    Patient: Samuel Koehler  YOB: 1975  Date of Visit: 6/6/2023      Dear Dr. Holland:    Thank you for referring Samuel Koehler to me for evaluation. Below are my notes for this consultation.    If you have questions, please do not hesitate to call me. I look forward to following your patient along with you.         Sincerely,        Luis Antonio Millan MD        CC: No Recipients    Luis Antonio Millan MD  6/7/2023  9:12 AM  Signed  Cardiology Consult/New Patient    Reason for visit: Status post mitral valve replacement with mechanical valve.    DADA Male presents today for evaluation.  He is a 48-year-old male with a history of smoking, hypertension, hyperlipidemia who was told he should have an echocardiogram because of his heart murmur but never had it done.  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  His echocardiogram at that time showed severe mitral regurgitation.  He tells me that he had undergone cardiac catheterization and did not show any significant coronary disease.    Alfonzo had mitral valve replacement with a mechanical mitral valve and left atrial appendage clip..  This will require lifelong anticoagulation with warfarin.    Since his discharge he has been doing well and is scheduled to start cardiac rehab.  He does feel fatigued at times.  There has not been any chest discomfort, shortness of breath, lower extremity edema, PND or orthopnea.  He has not had any neurological or TIA-like symptoms.    There has not been any bleeding issues related to his anticoagulation with warfarin.  His most recent INR was 2.3.  INR goal is 2.5-3.5 given his mechanical valve.    Alfonzo has a history of mild hypertension.  There is no diabetes.  He has hyperlipidemia.  He is a former smoker having quit 3-1/2  years ago.  He has a family history of coronary artery disease with his father but later in life.    Medical History:   Past Medical History:   Diagnosis Date   • ADHD 10/4/2019       Surgical History:   Past Surgical History:   Procedure Laterality Date   • CARDIAC VALVE SURGERY     • KNEE ARTHROSCOPY W/ MENISCECTOMY Right    • LIVER SURGERY     • MITRAL VALVE REPLACEMENT Left 03/20/2023    left atrial appendage clip placement        Family History:   Family History   Problem Relation Age of Onset   • Heart disease Biological Father    • Diabetes Biological Father    • Heart disease Maternal Grandmother         Social History:    Social History     Tobacco Use   • Smoking status: Former     Types: Cigarettes     Quit date: 9/25/2019     Years since quitting: 3.7   • Smokeless tobacco: Never   Substance Use Topics   • Alcohol use: Yes     Comment: few/mo   • Drug use: No        Allergies: Penicillins    Current Outpatient Medications   Medication Instructions   • acetaminophen (TYLENOL) 975 mg   • ALPRAZolam (XANAX) 0.5 mg, oral, 3 times daily PRN, for anxiety   • aspirin 81 mg   • atorvastatin (LIPITOR) 40 mg   • clindamycin (CLEOCIN HCL) 150 mg, oral, See admin instructions, Take 4 tablets ( 600mg ) one hour prior to dentist   • metoprolol succinate XL (TOPROL-XL) 25 mg   • warfarin (COUMADIN) 5 mg tablet Monday and Friday 10 MG   TWTHSS 5 MG         Review of Systems  Review of Systems   Cardiovascular: Positive for dyspnea on exertion. Negative for chest pain, irregular heartbeat, leg swelling, near-syncope, orthopnea, palpitations, paroxysmal nocturnal dyspnea and syncope.       Objective     Vitals:    06/06/23 1314   BP: 126/80   Pulse: 85   Resp: 20       Physical Exam  Constitutional:       Appearance: He is well-developed.   Neck:      Vascular: No carotid bruit or JVD.   Cardiovascular:      Rate and Rhythm: Normal rate and regular rhythm.      Pulses:           Carotid pulses are 2+ on the right  side and 2+ on the left side.       Dorsalis pedis pulses are 2+ on the right side and 2+ on the left side.        Posterior tibial pulses are 2+ on the right side and 2+ on the left side.      Heart sounds: S1 normal and S2 normal. No murmur heard.     No friction rub. No gallop.      Comments: CRISP valve sounds.  No mitral insufficiency audible.  Pulmonary:      Effort: Pulmonary effort is normal.      Breath sounds: Normal breath sounds.   Musculoskeletal:      Right lower leg: No edema.      Left lower leg: No edema.   Skin:     General: Skin is warm and dry.   Neurological:      Mental Status: He is alert.   Psychiatric:         Behavior: Behavior normal.              ECG.  Normal sinus rhythm, nonspecific T wave abnormality.      Assessment/Plan     History of mitral valve replacement with mechanical valve  I will arrange for Alfonzo to start having his INR monitored in our Coumadin clinic.  Our goal will be 2.5-3.5.  He will continue on the aspirin daily.    I emphasized to him the need to make sure that he does not miss any of his warfarin doses and that he is diligent about getting his follow-up INR.  We will also check with his insurance about getting a home monitor.  I also discussed with him about avoiding vitamin K containing foods and he had been given information about this at the hospital.    I sent a prescription for the clindamycin into the pharmacy for him so that he has it available.    I also discussed with Alfonzo the need for SBE prophylaxis prior to going to the dentist.  He is allergic to penicillin and will take clindamycin 600 mg 1 hour prior to going to the dentist for any reason including cleanings and x-rays.    Mixed hyperlipidemia  Alfonzo will continue on the atorvastatin 40 mg daily.  I will have him get a follow-up lipid profile and CMP after being on the medication for 3 months.    I discussed with him the need for diet, exercise and weight loss.  He has a BMI of over 30.    Warfarin  anticoagulation  I explained to Alfonzo that he will need lifelong anticoagulation given his mechanical valve.  INR goal of 2.5-3.5.  We will arrange for him to start having his INR checked in our Coumadin clinic as well as getting a home monitor.    We had a prolonged discussion about these complex clinical issues and went over the various important aspects to consider. All questions were answered.      Alfonzo will be coming due for his day-to-day care.  I have asked him to return to see me in the fall.  He will call with any questions or concerns in the interim.  I will continue to keep you informed of his progress.    If you have any questions, or need any further information, please do not hesitate to contact me.    I spent 45 minutes on this date of service performing the following activities: obtaining history, performing examination, entering orders, documenting, preparing for visit, obtaining / reviewing records, providing counseling and education and communicating results.        Luis Antonio Millan MD  6/7/2023

## 2023-06-07 NOTE — ASSESSMENT & PLAN NOTE
I explained to Bill that he will need lifelong anticoagulation given his mechanical valve.  INR goal of 2.5-3.5.  We will arrange for him to start having his INR checked in our Coumadin clinic as well as getting a home monitor.

## 2023-06-07 NOTE — ASSESSMENT & PLAN NOTE
Alfonzo will continue on the atorvastatin 40 mg daily.  I will have him get a follow-up lipid profile and CMP after being on the medication for 3 months.    I discussed with him the need for diet, exercise and weight loss.  He has a BMI of over 30.

## 2023-06-07 NOTE — ASSESSMENT & PLAN NOTE
I will arrange for Alfonzo to start having his INR monitored in our Coumadin clinic.  Our goal will be 2.5-3.5.  He will continue on the aspirin daily.    I emphasized to him the need to make sure that he does not miss any of his warfarin doses and that he is diligent about getting his follow-up INR.  We will also check with his insurance about getting a home monitor.  I also discussed with him about avoiding vitamin K containing foods and he had been given information about this at the hospital.    I sent a prescription for the clindamycin into the pharmacy for him so that he has it available.    I also discussed with Bill the need for SBE prophylaxis prior to going to the dentist.  He is allergic to penicillin and will take clindamycin 600 mg 1 hour prior to going to the dentist for any reason including cleanings and x-rays.

## 2023-06-12 ENCOUNTER — ANTICOAGULATION VISIT (OUTPATIENT)
Dept: CARDIOLOGY | Facility: CLINIC | Age: 48
End: 2023-06-12
Payer: COMMERCIAL

## 2023-06-12 DIAGNOSIS — Z95.2 HISTORY OF MITRAL VALVE REPLACEMENT WITH MECHANICAL VALVE: Primary | ICD-10-CM

## 2023-06-12 LAB — INTERNATIONAL NORMALIZATION RATIO, POC: 2

## 2023-06-12 PROCEDURE — 85610 PROTHROMBIN TIME: CPT | Performed by: NURSE PRACTITIONER

## 2023-06-19 ENCOUNTER — ANTICOAGULATION VISIT (OUTPATIENT)
Dept: CARDIOLOGY | Facility: CLINIC | Age: 48
End: 2023-06-19
Payer: COMMERCIAL

## 2023-06-19 DIAGNOSIS — Z95.2 HISTORY OF MITRAL VALVE REPLACEMENT WITH MECHANICAL VALVE: Primary | ICD-10-CM

## 2023-06-19 LAB — INTERNATIONAL NORMALIZATION RATIO, POC: 2.4

## 2023-06-19 PROCEDURE — 85610 PROTHROMBIN TIME: CPT | Performed by: NURSE PRACTITIONER

## 2023-07-05 ENCOUNTER — ANTICOAGULATION VISIT (OUTPATIENT)
Dept: CARDIOLOGY | Facility: CLINIC | Age: 48
End: 2023-07-05
Payer: COMMERCIAL

## 2023-07-05 DIAGNOSIS — Z95.2 HISTORY OF MITRAL VALVE REPLACEMENT WITH MECHANICAL VALVE: Primary | ICD-10-CM

## 2023-07-05 LAB — INTERNATIONAL NORMALIZATION RATIO, POC: 3.2

## 2023-07-05 PROCEDURE — 85610 PROTHROMBIN TIME: CPT | Performed by: NURSE PRACTITIONER

## 2023-07-05 NOTE — PROGRESS NOTES
In office fsk 3.2. Pt feeling well without complaints. Pt will be on vacation and will return 07/24/2023. Repeat fsk 07/26/2023. He will call in the interim with q/q.

## 2023-07-07 LAB
ALBUMIN SERPL-MCNC: 4.8 G/DL (ref 4–5)
ALBUMIN/GLOB SERPL: 2.4 {RATIO} (ref 1.2–2.2)
ALP SERPL-CCNC: 75 IU/L (ref 44–121)
ALT SERPL-CCNC: 74 IU/L (ref 0–44)
AST SERPL-CCNC: 64 IU/L (ref 0–40)
BILIRUB SERPL-MCNC: 1.2 MG/DL (ref 0–1.2)
BUN SERPL-MCNC: 11 MG/DL (ref 6–24)
BUN/CREAT SERPL: 13 (ref 9–20)
CALCIUM SERPL-MCNC: 9.1 MG/DL (ref 8.7–10.2)
CHLORIDE SERPL-SCNC: 105 MMOL/L (ref 96–106)
CHOLEST SERPL-MCNC: 131 MG/DL (ref 100–199)
CO2 SERPL-SCNC: 22 MMOL/L (ref 20–29)
CREAT SERPL-MCNC: 0.86 MG/DL (ref 0.76–1.27)
EGFRCR SERPLBLD CKD-EPI 2021: 105 ML/MIN/1.73
GLOBULIN SER CALC-MCNC: 2 G/DL (ref 1.5–4.5)
GLUCOSE SERPL-MCNC: 88 MG/DL (ref 70–99)
HDLC SERPL-MCNC: 53 MG/DL
LDLC SERPL CALC-MCNC: 64 MG/DL (ref 0–99)
POTASSIUM SERPL-SCNC: 4.5 MMOL/L (ref 3.5–5.2)
PROT SERPL-MCNC: 6.8 G/DL (ref 6–8.5)
SODIUM SERPL-SCNC: 142 MMOL/L (ref 134–144)
TRIGL SERPL-MCNC: 65 MG/DL (ref 0–149)
VLDLC SERPL CALC-MCNC: 14 MG/DL (ref 5–40)

## 2023-07-24 ENCOUNTER — ANTICOAGULATION VISIT (OUTPATIENT)
Dept: CARDIOLOGY | Facility: CLINIC | Age: 48
End: 2023-07-24
Payer: COMMERCIAL

## 2023-07-24 DIAGNOSIS — Z95.2 HISTORY OF MITRAL VALVE REPLACEMENT WITH MECHANICAL VALVE: Primary | ICD-10-CM

## 2023-07-24 LAB — INTERNATIONAL NORMALIZATION RATIO, POC: 2.2

## 2023-07-24 PROCEDURE — 85610 PROTHROMBIN TIME: CPT | Performed by: NURSE PRACTITIONER

## 2023-07-24 NOTE — PROGRESS NOTES
INR 2.2. Pt just returned from vacation. Denies dietary changes or medication changes. Pt will take 15 mg tonight, 10 mg all other days. He will repeat in 2 weeks. He will call in the interim with q/q.

## 2023-08-07 ENCOUNTER — ANTICOAGULATION VISIT (OUTPATIENT)
Dept: CARDIOLOGY | Facility: CLINIC | Age: 48
End: 2023-08-07
Payer: COMMERCIAL

## 2023-08-07 DIAGNOSIS — Z95.2 HISTORY OF MITRAL VALVE REPLACEMENT WITH MECHANICAL VALVE: Primary | ICD-10-CM

## 2023-08-07 LAB — INTERNATIONAL NORMALIZATION RATIO, POC: 3

## 2023-08-07 PROCEDURE — 85610 PROTHROMBIN TIME: CPT | Performed by: NURSE PRACTITIONER

## 2023-08-21 DIAGNOSIS — I27.20 PULMONARY HYPERTENSION (CMS/HCC): Primary | ICD-10-CM

## 2023-08-21 RX ORDER — METOPROLOL SUCCINATE 25 MG/1
25 TABLET, EXTENDED RELEASE ORAL DAILY
Qty: 90 TABLET | Refills: 3 | Status: SHIPPED | OUTPATIENT
Start: 2023-08-21 | End: 2024-04-09 | Stop reason: SDUPTHER

## 2023-08-23 ENCOUNTER — ANTICOAGULATION VISIT (OUTPATIENT)
Dept: CARDIOLOGY | Facility: CLINIC | Age: 48
End: 2023-08-23
Payer: COMMERCIAL

## 2023-08-23 DIAGNOSIS — Z95.2 HISTORY OF MITRAL VALVE REPLACEMENT WITH MECHANICAL VALVE: Primary | ICD-10-CM

## 2023-08-23 LAB — INTERNATIONAL NORMALIZATION RATIO, POC: 2.5

## 2023-08-23 PROCEDURE — 85610 PROTHROMBIN TIME: CPT | Performed by: NURSE PRACTITIONER

## 2023-09-11 DIAGNOSIS — Z95.2 HISTORY OF MITRAL VALVE REPLACEMENT WITH MECHANICAL VALVE: ICD-10-CM

## 2023-09-11 DIAGNOSIS — E78.2 MIXED HYPERLIPIDEMIA: Primary | ICD-10-CM

## 2023-09-11 RX ORDER — WARFARIN SODIUM 5 MG/1
TABLET ORAL
Qty: 120 TABLET | Refills: 3 | Status: SHIPPED | OUTPATIENT
Start: 2023-09-11 | End: 2024-01-29 | Stop reason: SDUPTHER

## 2023-09-11 RX ORDER — ATORVASTATIN CALCIUM 40 MG/1
40 TABLET, FILM COATED ORAL DAILY
Qty: 90 TABLET | Refills: 3 | Status: SHIPPED | OUTPATIENT
Start: 2023-09-11 | End: 2024-04-09 | Stop reason: SDUPTHER

## 2023-09-13 ENCOUNTER — ANTICOAGULATION VISIT (OUTPATIENT)
Dept: CARDIOLOGY | Facility: CLINIC | Age: 48
End: 2023-09-13
Payer: COMMERCIAL

## 2023-09-13 DIAGNOSIS — Z95.2 HISTORY OF MITRAL VALVE REPLACEMENT WITH MECHANICAL VALVE: Primary | ICD-10-CM

## 2023-09-13 LAB — INTERNATIONAL NORMALIZATION RATIO, POC: 1.8

## 2023-09-13 PROCEDURE — 85610 PROTHROMBIN TIME: CPT | Performed by: INTERNAL MEDICINE

## 2023-09-13 NOTE — PROGRESS NOTES
In office fsk 1.8. When asked if any dietary or medications changes patient reports a decrease in alcohol intake. He had been drinking two IPA beers every other day during the week and on weekends. He has decreased alcohol intake to only weekends. Pt will increase coumadin to 15 mg three times weekly, 10 mg all other days. Pt will repeat this Friday. He will call in the interim with q/q.

## 2023-09-15 ENCOUNTER — APPOINTMENT (OUTPATIENT)
Dept: CARDIOLOGY | Facility: CLINIC | Age: 48
End: 2023-09-15
Payer: COMMERCIAL

## 2023-09-15 DIAGNOSIS — Z79.01 WARFARIN ANTICOAGULATION: ICD-10-CM

## 2023-09-15 DIAGNOSIS — Z95.2 HISTORY OF MITRAL VALVE REPLACEMENT WITH MECHANICAL VALVE: Primary | ICD-10-CM

## 2023-09-16 LAB
INR PPP: 2.1 (ref 0.9–1.2)
PROTHROMBIN TIME: 21.5 SEC (ref 9.1–12)

## 2023-09-18 ENCOUNTER — ANTICOAGULATION VISIT (OUTPATIENT)
Dept: CARDIOLOGY | Facility: CLINIC | Age: 48
End: 2023-09-18
Payer: COMMERCIAL

## 2023-09-18 DIAGNOSIS — Z95.2 HISTORY OF MITRAL VALVE REPLACEMENT WITH MECHANICAL VALVE: Primary | ICD-10-CM

## 2023-09-19 DIAGNOSIS — Z12.11 SCREENING FOR COLON CANCER: Primary | ICD-10-CM

## 2023-09-21 ENCOUNTER — ANTICOAGULATION VISIT (OUTPATIENT)
Dept: CARDIOLOGY | Facility: CLINIC | Age: 48
End: 2023-09-21
Payer: COMMERCIAL

## 2023-09-21 DIAGNOSIS — Z95.2 HISTORY OF MITRAL VALVE REPLACEMENT WITH MECHANICAL VALVE: Primary | ICD-10-CM

## 2023-09-21 LAB — INTERNATIONAL NORMALIZATION RATIO, POC: 2.6

## 2023-09-21 PROCEDURE — 85610 PROTHROMBIN TIME: CPT | Performed by: NURSE PRACTITIONER

## 2023-09-22 ENCOUNTER — TELEPHONE (OUTPATIENT)
Dept: SCHEDULING | Facility: CLINIC | Age: 48
End: 2023-09-22
Payer: COMMERCIAL

## 2023-09-22 NOTE — TELEPHONE ENCOUNTER
Pt mother Katya calling to schedule HFV appt with Dr Millan. Pt is currently at [facility]. Pt has existing follow up appt scheduled and wanted to know if appt should be sooner please advise   Jennifer Thompson [phone]

## 2023-09-22 NOTE — TELEPHONE ENCOUNTER
"Pt of Dr. Millan, last OV 6/6/23. PMH includes HTN, HLD, mechanical MVR on warfarin.    Received call from patient reporting abrupt onset of mid-sternal chest pain that began about 30 minutes ago while driving. Pain radiates into left anterior chest wall and states his left jaw \"feels weird.\" Pain worsens w/ palpation. He denies dizziness, lightheadedness, nausea, or diaphoresis, but states breathing feels slightly labored. Unsure if it is related to chest pain or anxiety. Patient had similar episode on Sunday night, states it resolved with 1-2 hours.     Patient also reports slightly elevated HR. Current HR on smart watch is 97 bpm, he was tachycardic at 107 bpm prior to calling in.     Patient has 1 cup of caffeinated coffee daily, states he did go to a winery last night and drank 1 bottle of wine (about 4 glasses total). He is compliant with all cardiac medication including warfarin, atorvastatin, and metoprolol.     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     Due to acute onset of chest pain, advised patient to be seen in nearest ED. States he is currently driving home from [place] and is near [facility], will go there.     Patient can be reached at [phone].   "

## 2023-09-22 NOTE — TELEPHONE ENCOUNTER
Called Katya- explained there are no sooner appts that what he has scheduled on 10-3-2023. He will keep that appt. THX

## 2023-09-28 ENCOUNTER — TELEPHONE (OUTPATIENT)
Dept: FAMILY MEDICINE | Facility: CLINIC | Age: 48
End: 2023-09-28
Payer: COMMERCIAL

## 2023-10-03 ENCOUNTER — TELEPHONE (OUTPATIENT)
Dept: CARDIOLOGY | Facility: CLINIC | Age: 48
End: 2023-10-03
Payer: COMMERCIAL

## 2023-10-03 NOTE — TELEPHONE ENCOUNTER
When Alfonzo comes in to have his INR checked tomorrow and his office visit.  Please give him this number to call so that we can continue with the onboarding process for his monitor.

## 2023-10-03 NOTE — TELEPHONE ENCOUNTER
Dr. Monty Beavers called and reports that they have not been able to reach the patient to complete the on boarding process.   Please have the patient call [phone]     Thanks!

## 2023-10-03 NOTE — PROGRESS NOTES
Cardiology Note       Reason for visit: Mitral valve replacement.      Alfonzo returns today for follow-up.  He tells me that he had gone to the emergency room because he was having chest pain when he was driving back from [place].  His work-up there was negative and it sounds like he had a CAT scan to rule out a pulmonary embolus.  He states that he had been driving for a while and had both his arms up on the steering wheel.  It was a sharp pain and lasted for probably 15 minutes he states.  No associated shortness of breath or diaphoresis.  It has not recurred.  Alfonzo is able to do his activities of daily living without getting any chest discomfort.    There has not been any shortness of breath.  Dizziness, near-syncope or syncope.        Past Medical History:   Diagnosis Date    ADHD 10/4/2019     Past Surgical History:   Procedure Laterality Date    CARDIAC VALVE SURGERY      KNEE ARTHROSCOPY W/ MENISCECTOMY Right     LIVER SURGERY      MITRAL VALVE REPLACEMENT Left 2023    left atrial appendage clip placement      Social History     Tobacco Use    Smoking status: Former     Types: Cigarettes     Quit date: 2019     Years since quittin.0    Smokeless tobacco: Never   Substance Use Topics    Alcohol use: Not Currently     Comment: few/mo    Drug use: No      Family History   Problem Relation Age of Onset    Heart disease Biological Father     Diabetes Biological Father     Heart disease Maternal Grandmother      Penicillins  Current Outpatient Medications   Medication Instructions    acetaminophen (TYLENOL) 975 mg    ALPRAZolam (XANAX) 0.5 mg, oral, 3 times daily PRN, for anxiety    atorvastatin (LIPITOR) 40 mg, oral, Daily    clindamycin (CLEOCIN HCL) 150 mg, oral, See admin instructions, Take 4 tablets ( 600mg ) one hour prior to dentist    metoprolol succinate XL (TOPROL-XL) 25 mg, oral, Daily    warfarin (COUMADIN) 5 mg tablet Monday and Friday 10 MG   TWTHSS 5 MG            Review of Systems   Cardiovascular: Positive for chest pain. Negative for dyspnea on exertion, irregular heartbeat, leg swelling, near-syncope, orthopnea, palpitations, paroxysmal nocturnal dyspnea and syncope.   Neurological: Negative for dizziness and light-headedness.      Objective    Vitals:    10/04/23 1508   BP: 100/70   Pulse: 80   Resp: 16      Wt Readings from Last 3 Encounters:   10/04/23 111 kg (244 lb)   06/06/23 108 kg (238 lb 6.4 oz)   04/25/23 105 kg (231 lb 6.4 oz)      Physical Exam  Constitutional:       Appearance: He is well-developed.   Neck:      Vascular: No carotid bruit or JVD.   Cardiovascular:      Rate and Rhythm: Normal rate and regular rhythm.      Pulses:           Carotid pulses are 2+ on the right side and 2+ on the left side.       Dorsalis pedis pulses are 2+ on the right side and 2+ on the left side.        Posterior tibial pulses are 2+ on the right side and 2+ on the left side.      Heart sounds: S1 normal and S2 normal. No murmur heard.     No friction rub. No gallop.      Comments: Crisp valve sounds.  No mitral regurgitation audible.  Pulmonary:      Effort: Pulmonary effort is normal.      Breath sounds: Normal breath sounds.   Musculoskeletal:      Right lower leg: No edema.      Left lower leg: No edema.   Skin:     General: Skin is warm and dry.   Neurological:      Mental Status: He is alert.   Psychiatric:         Behavior: Behavior normal.                   Cardiac catheterization.  3/17/2023.  Widely patent epicardial coronary arteries.     Transesophageal echocardiogram.  3/17/2023.  Marked prolapse of the P2 scallop.  Flail posterior leaflet with severe mitral regurgitation.  Aortic valve is normal in appearance.  Tricuspid valve is normal.  RV systolic pressure 50 mmHg.  Left atrium normal in size.  No thrombus.  Left ventricle is normal in size and thickness.  Normal left ventricular systolic function.  No regional wall motion abnormalities.     ECG.   Normal sinus rhythm.  Compared to previous EKG, nonspecific T wave abnormalities no longer present.     Assessment/Plan    History of mitral valve replacement with mechanical valve  Status post mechanical mitral valve replacement.  He will remain on warfarin with an INR goal of 2.5-3.5.  His INR today was 1.9 and his warfarin was adjusted.  He will get a repeat INR next week.  If his INR is within goal, then I will have him discontinue the aspirin.    I counseled Alfonzo on continued abstinence from alcohol as well as food groups that can affect vitamin K antagonist such as warfarin.    I once again reminded him about the need for SBE prophylaxis prior to going to the dentist.  He is allergic to penicillin and will use clindamycin.    Mixed hyperlipidemia  Continue atorvastatin 40 mg daily.  Lipid profile in July revealed a total cholesterol of 131, triglycerides were 65, HDL was 53 and LDL was 64.    Alfonzo has gained 13 pounds since April and I counseled him on the need for diet, exercise and weight loss.  I have given him information about the Mediterranean diet.    Warfarin anticoagulation  Continue warfarin.  Will be dose adjusted and INR goal is 2.5-3.5.  Alfonzo will be getting a home monitor which will make it easier for him to maintain adequate anticoagulation control.    Alfonzo will be coming due for his day-to-day care.  I have asked him to return to see me in 6 months time or sooner if there is a problem.  I will have him get a repeat echocardiogram at the time of his office visit.  He will call with any questions or concerns in the interim.            Thank you for allowing me to participate in the care of this patient.  If you have any questions please don't hesitate to contact me.    I spent 30 minutes on this date of service performing the following activities: obtaining history, performing examination, entering orders, documenting, preparing for visit, obtaining / reviewing records, providing counseling and  education and communicating results.     Luis Antonio Millan MD [facility]   10/4/2023  3:54 PM

## 2023-10-04 ENCOUNTER — ANTICOAGULATION VISIT (OUTPATIENT)
Dept: CARDIOLOGY | Facility: CLINIC | Age: 48
End: 2023-10-04
Payer: COMMERCIAL

## 2023-10-04 ENCOUNTER — OFFICE VISIT (OUTPATIENT)
Dept: CARDIOLOGY | Facility: CLINIC | Age: 48
End: 2023-10-04
Payer: COMMERCIAL

## 2023-10-04 VITALS
HEIGHT: 72 IN | SYSTOLIC BLOOD PRESSURE: 100 MMHG | BODY MASS INDEX: 33.05 KG/M2 | WEIGHT: 244 LBS | HEART RATE: 80 BPM | DIASTOLIC BLOOD PRESSURE: 70 MMHG | RESPIRATION RATE: 16 BRPM

## 2023-10-04 DIAGNOSIS — Z95.2 HISTORY OF MITRAL VALVE REPLACEMENT WITH MECHANICAL VALVE: Primary | ICD-10-CM

## 2023-10-04 DIAGNOSIS — Z79.01 WARFARIN ANTICOAGULATION: ICD-10-CM

## 2023-10-04 DIAGNOSIS — E78.2 MIXED HYPERLIPIDEMIA: ICD-10-CM

## 2023-10-04 LAB — INTERNATIONAL NORMALIZATION RATIO, POC: 1.9

## 2023-10-04 PROCEDURE — 93000 ELECTROCARDIOGRAM COMPLETE: CPT | Performed by: INTERNAL MEDICINE

## 2023-10-04 PROCEDURE — 85610 PROTHROMBIN TIME: CPT | Performed by: NURSE PRACTITIONER

## 2023-10-04 PROCEDURE — 99214 OFFICE O/P EST MOD 30 MIN: CPT | Performed by: INTERNAL MEDICINE

## 2023-10-04 PROCEDURE — 3008F BODY MASS INDEX DOCD: CPT | Performed by: INTERNAL MEDICINE

## 2023-10-04 ASSESSMENT — ENCOUNTER SYMPTOMS
SYNCOPE: 0
ORTHOPNEA: 0
DIZZINESS: 0
LIGHT-HEADEDNESS: 0
PALPITATIONS: 0
PND: 0
IRREGULAR HEARTBEAT: 0
NEAR-SYNCOPE: 0
DYSPNEA ON EXERTION: 0

## 2023-10-04 NOTE — ASSESSMENT & PLAN NOTE
Status post mechanical mitral valve replacement.  He will remain on warfarin with an INR goal of 2.5-3.5.  His INR today was 1.9 and his warfarin was adjusted.  He will get a repeat INR next week.  If his INR is within goal, then I will have him discontinue the aspirin.    I counseled Bill on continued abstinence from alcohol as well as food groups that can affect vitamin K antagonist such as warfarin.    I once again reminded him about the need for SBE prophylaxis prior to going to the dentist.  He is allergic to penicillin and will use clindamycin.

## 2023-10-04 NOTE — ASSESSMENT & PLAN NOTE
Continue atorvastatin 40 mg daily.  Lipid profile in July revealed a total cholesterol of 131, triglycerides were 65, HDL was 53 and LDL was 64.    Alfonzo has gained 13 pounds since April and I counseled him on the need for diet, exercise and weight loss.  I have given him information about the Mediterranean diet.

## 2023-10-04 NOTE — ASSESSMENT & PLAN NOTE
Continue warfarin.  Will be dose adjusted and INR goal is 2.5-3.5.  Bill will be getting a home monitor which will make it easier for him to maintain adequate anticoagulation control.

## 2023-10-04 NOTE — PROGRESS NOTES
INR 1.9 Patient states he was drinking alcohol on the weekends however recently has stopped drinking alcohol all together. Denies other dietary or medication changes. Pt will take 20 mg tonight. Moving forward he will increase to 10 mg Tuesday and Thursday, 15 mg AOD with repeat in one week. He verbalized understanding. He will call in the interim with q/q.

## 2023-10-04 NOTE — LETTER
2023     Suresh Holland MD  [address]  [facility] [address]    Patient: Samuel Koehler  YOB: 1975  Date of Visit: 10/4/2023      Dear Dr. Holland:    Thank you for referring Samuel Koehler to me for evaluation. Below are my notes for this consultation.    If you have questions, please do not hesitate to call me. I look forward to following your patient along with you.         Sincerely,        Luis Antonio Millan MD        CC: No Recipients    Luis Antonio Millan MD  10/4/2023  3:54 PM  Signed     Cardiology Note       Reason for visit: Mitral valve replacement.      Alfonzo returns today for follow-up.  He tells me that he had gone to the emergency room because he was having chest pain when he was driving back from [place].  His work-up there was negative and it sounds like he had a CAT scan to rule out a pulmonary embolus.  He states that he had been driving for a while and had both his arms up on the steering wheel.  It was a sharp pain and lasted for probably 15 minutes he states.  No associated shortness of breath or diaphoresis.  It has not recurred.  Alfonzo is able to do his activities of daily living without getting any chest discomfort.    There has not been any shortness of breath.  Dizziness, near-syncope or syncope.        Past Medical History:   Diagnosis Date    ADHD 10/4/2019     Past Surgical History:   Procedure Laterality Date    CARDIAC VALVE SURGERY      KNEE ARTHROSCOPY W/ MENISCECTOMY Right     LIVER SURGERY      MITRAL VALVE REPLACEMENT Left 2023    left atrial appendage clip placement      Social History     Tobacco Use    Smoking status: Former     Types: Cigarettes     Quit date: 2019     Years since quittin.0    Smokeless tobacco: Never   Substance Use Topics    Alcohol use: Not Currently     Comment: few/mo    Drug use: No      Family History   Problem Relation Age of Onset    Heart disease Biological Father      Diabetes Biological Father     Heart disease Maternal Grandmother      Penicillins  Current Outpatient Medications   Medication Instructions    acetaminophen (TYLENOL) 975 mg    ALPRAZolam (XANAX) 0.5 mg, oral, 3 times daily PRN, for anxiety    atorvastatin (LIPITOR) 40 mg, oral, Daily    clindamycin (CLEOCIN HCL) 150 mg, oral, See admin instructions, Take 4 tablets ( 600mg ) one hour prior to dentist    metoprolol succinate XL (TOPROL-XL) 25 mg, oral, Daily    warfarin (COUMADIN) 5 mg tablet Monday and Friday 10 MG   TWTHSS 5 MG           Review of Systems   Cardiovascular: Positive for chest pain. Negative for dyspnea on exertion, irregular heartbeat, leg swelling, near-syncope, orthopnea, palpitations, paroxysmal nocturnal dyspnea and syncope.   Neurological: Negative for dizziness and light-headedness.      Objective    Vitals:    10/04/23 1508   BP: 100/70   Pulse: 80   Resp: 16      Wt Readings from Last 3 Encounters:   10/04/23 111 kg (244 lb)   06/06/23 108 kg (238 lb 6.4 oz)   04/25/23 105 kg (231 lb 6.4 oz)      Physical Exam  Constitutional:       Appearance: He is well-developed.   Neck:      Vascular: No carotid bruit or JVD.   Cardiovascular:      Rate and Rhythm: Normal rate and regular rhythm.      Pulses:           Carotid pulses are 2+ on the right side and 2+ on the left side.       Dorsalis pedis pulses are 2+ on the right side and 2+ on the left side.        Posterior tibial pulses are 2+ on the right side and 2+ on the left side.      Heart sounds: S1 normal and S2 normal. No murmur heard.     No friction rub. No gallop.      Comments: Crisp valve sounds.  No mitral regurgitation audible.  Pulmonary:      Effort: Pulmonary effort is normal.      Breath sounds: Normal breath sounds.   Musculoskeletal:      Right lower leg: No edema.      Left lower leg: No edema.   Skin:     General: Skin is warm and dry.   Neurological:      Mental Status: He is alert.   Psychiatric:          Behavior: Behavior normal.                   Cardiac catheterization.  3/17/2023.  Widely patent epicardial coronary arteries.     Transesophageal echocardiogram.  3/17/2023.  Marked prolapse of the P2 scallop.  Flail posterior leaflet with severe mitral regurgitation.  Aortic valve is normal in appearance.  Tricuspid valve is normal.  RV systolic pressure 50 mmHg.  Left atrium normal in size.  No thrombus.  Left ventricle is normal in size and thickness.  Normal left ventricular systolic function.  No regional wall motion abnormalities.     ECG.  Normal sinus rhythm.  Compared to previous EKG, nonspecific T wave abnormalities no longer present.     Assessment/Plan    History of mitral valve replacement with mechanical valve  Status post mechanical mitral valve replacement.  He will remain on warfarin with an INR goal of 2.5-3.5.  His INR today was 1.9 and his warfarin was adjusted.  He will get a repeat INR next week.  If his INR is within goal, then I will have him discontinue the aspirin.    I counseled Alfonzo on continued abstinence from alcohol as well as food groups that can affect vitamin K antagonist such as warfarin.    I once again reminded him about the need for SBE prophylaxis prior to going to the dentist.  He is allergic to penicillin and will use clindamycin.    Mixed hyperlipidemia  Continue atorvastatin 40 mg daily.  Lipid profile in July revealed a total cholesterol of 131, triglycerides were 65, HDL was 53 and LDL was 64.    Alfonzo has gained 13 pounds since April and I counseled him on the need for diet, exercise and weight loss.  I have given him information about the Mediterranean diet.    Warfarin anticoagulation  Continue warfarin.  Will be dose adjusted and INR goal is 2.5-3.5.  Alfonzo will be getting a home monitor which will make it easier for him to maintain adequate anticoagulation control.    Alfonzo will be coming due for his day-to-day care.  I have asked him to return to see me in 6 months  time or sooner if there is a problem.  I will have him get a repeat echocardiogram at the time of his office visit.  He will call with any questions or concerns in the interim.           Thank you for allowing me to participate in the care of this patient.  If you have any questions please don't hesitate to contact me.    I spent 30 minutes on this date of service performing the following activities: obtaining history, performing examination, entering orders, documenting, preparing for visit, obtaining / reviewing records, providing counseling and education and communicating results.     Luis Antonio Millan MD [facility]   10/4/2023  3:54 PM

## 2023-10-05 ENCOUNTER — OFFICE VISIT (OUTPATIENT)
Dept: FAMILY MEDICINE | Facility: CLINIC | Age: 48
End: 2023-10-05
Payer: COMMERCIAL

## 2023-10-05 VITALS
TEMPERATURE: 97.2 F | HEART RATE: 85 BPM | BODY MASS INDEX: 32.91 KG/M2 | SYSTOLIC BLOOD PRESSURE: 122 MMHG | WEIGHT: 243 LBS | DIASTOLIC BLOOD PRESSURE: 82 MMHG | OXYGEN SATURATION: 96 % | HEIGHT: 72 IN

## 2023-10-05 DIAGNOSIS — Z00.00 ENCOUNTER FOR GENERAL ADULT MEDICAL EXAMINATION WITHOUT ABNORMAL FINDINGS: Primary | ICD-10-CM

## 2023-10-05 DIAGNOSIS — Z12.5 SCREENING FOR PROSTATE CANCER: ICD-10-CM

## 2023-10-05 DIAGNOSIS — Z12.11 ENCOUNTER FOR FIT (FECAL IMMUNOCHEMICAL TEST) SCREENING: ICD-10-CM

## 2023-10-05 PROCEDURE — 90750 HZV VACC RECOMBINANT IM: CPT | Performed by: FAMILY MEDICINE

## 2023-10-05 PROCEDURE — 90472 IMMUNIZATION ADMIN EACH ADD: CPT | Performed by: FAMILY MEDICINE

## 2023-10-05 PROCEDURE — 90471 IMMUNIZATION ADMIN: CPT | Performed by: FAMILY MEDICINE

## 2023-10-05 PROCEDURE — 99396 PREV VISIT EST AGE 40-64: CPT | Mod: 25 | Performed by: FAMILY MEDICINE

## 2023-10-05 PROCEDURE — 36415 COLL VENOUS BLD VENIPUNCTURE: CPT | Performed by: FAMILY MEDICINE

## 2023-10-05 PROCEDURE — 90686 IIV4 VACC NO PRSV 0.5 ML IM: CPT | Performed by: FAMILY MEDICINE

## 2023-10-05 PROCEDURE — 3008F BODY MASS INDEX DOCD: CPT | Performed by: FAMILY MEDICINE

## 2023-10-05 ASSESSMENT — ENCOUNTER SYMPTOMS
WEAKNESS: 0
FREQUENCY: 0
NAUSEA: 0
DYSURIA: 0
DIZZINESS: 0
NUMBNESS: 0
CONSTIPATION: 0
ARTHRALGIAS: 0
SORE THROAT: 0
SHORTNESS OF BREATH: 0
DIARRHEA: 0
PALPITATIONS: 0
COUGH: 0
ADENOPATHY: 0
FEVER: 0
ABDOMINAL PAIN: 0
BACK PAIN: 0
VOMITING: 0
RHINORRHEA: 0
CHILLS: 0
BLOOD IN STOOL: 0

## 2023-10-05 ASSESSMENT — PATIENT HEALTH QUESTIONNAIRE - PHQ9: SUM OF ALL RESPONSES TO PHQ9 QUESTIONS 1 & 2: 0

## 2023-10-05 NOTE — PATIENT INSTRUCTIONS
Diet - Try to limit portion sizes, take out, fast food, processed foods. Alcohol can be a expensive source of calories! If these are current problems for you, pick one area and focus on that first! There is no such thing as a perfect diet. If you are trying to lose weight, it will be difficult to do with foods you do not enjoy.  You may need to try a few different things before finding something.  In general, the diet with the best evidence is the Mediterranean diet. If you have high blood pressure, the DASH diet has good evidence to lower weight and BP.    Exercise - Goal should be 30-40 minutes, 3-4 times a week. If you are currently not exercising, set reachable goals with short term deadlines! The same goes with diet - you are less likely to be successful if you are doing something you don't enjoy.  Weights - even 1-2 pounds! - are great to strengthen bones in older adults.      Preventative Care Due - FIT Home Colon Cancer Test    Labs Due Today - Yes    Shots Due Today - Flu, Shingrix #1    COVID Vaccine - Omicron booster available for all adults.  This vaccine can be given 2 months after most recent COVID booster. Schedule at www.vaccines.gov. Tetanus should be every 10 years. Shingrix is to prevent Shingles and is a series of 2 shots starting at 50 years old.    Follow up - 1 year or sooner if anything comes up. We keep same-day sick appointments available every day for last minute problems.  If it is during the week - call us! Often times we can prevent an ER or UC visit! For certain problems, a telemedicine visit can be a great way to see me without having to come into the office or go to an UC!    If you have any specialists such as a Cardiologist, Gastroenterologist for a chronic problem, make sure you are following up with them as recommended!  Diabetics should be having a yearly eye exam by a Optometrist/Ophthalmologist and a foot exam by a Podiatrist!  Women should see their GYN yearly - though you  may not need a pap smear done at each visit.  Those with a family history of skin cancer should see a Dermatologist annually.

## 2023-10-05 NOTE — ASSESSMENT & PLAN NOTE
Adult Male  Complaints - None  Diet - Poor  Activity - None   Tobacco Use - None  EtOH Use - None   Drug Use - None   Sexual Activity -   PMH - MVR s/p replacement, HLD, PFO, Pulm HTN  FHX - D CAD DM; MGM CAD  Labs Ordered - CBC CMP PSA  Immunizations Recommended - Flu, Shingles  Preventative Care Recommended - FIT  Refused - Colonoscopy  Follow up - 1 y

## 2023-10-05 NOTE — PROGRESS NOTES
[address] [address]  [phone]       Reason for visit:   Chief Complaint   Patient presents with    Annual Exam      HPI   Samuel Koehler is a 48 y.o. male who presents for his CPX   Medical Updates Yes Saw Cardiology yesterday - Coumadin was adjusted for slightly low INR.     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  Colonoscopy Due Yes   CT Lung Due No     Hep A Due Yes   TDAP Due No   Flu Due Yes   Shingles Due Yes   COVID Vaccination Due Yes   Lipids Due No   Hep C Due No     Advanced Directives Complete No     Specialists: Cardiology, CT Surgery   Past Medical History:   Diagnosis Date    ADHD 10/04/2019    Lipid disorder     Mitral valvular regurgitation 2023    PFO (patent foramen ovale) 2023    Pulmonary hypertension (CMS/HCC) 2023     Past Surgical History:   Procedure Laterality Date    KNEE ARTHROSCOPY W/ MENISCECTOMY Right     LIVER SURGERY      MITRAL VALVE REPLACEMENT Left 2023    left atrial appendage clip placement      Social History     Socioeconomic History    Marital status:      Spouse name: Not on file    Number of children: Not on file    Years of education: Not on file    Highest education level: Not on file   Occupational History    Not on file   Tobacco Use    Smoking status: Former     Packs/day: 1.00     Years: 15.00     Additional pack years: 0.00     Total pack years: 15.00     Types: Cigarettes     Quit date: 2019     Years since quittin.0    Smokeless tobacco: Never   Substance and Sexual Activity    Alcohol use: Not Currently    Drug use: No    Sexual activity: Yes     Partners: Female   Other Topics Concern    Not on file   Social History Narrative    Do you wear your seatbelt? Yes    Do you have smoke detector in your home? Yes    Do you have a carbon  monoxide detector in your home? 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      Social Determinants of Health     Financial Resource Strain: Not on file   Food Insecurity: Not on file   Transportation Needs: Not on file   Physical Activity: Not on file   Stress: Not on file   Social Connections: Not on file   Intimate Partner Violence: Not on file   Housing Stability: Not on file     Family History   Problem Relation Age of Onset    Heart disease Biological Father     Diabetes Biological Father     Heart disease Maternal Grandmother      Penicillins  Current Outpatient Medications   Medication Sig Dispense Refill    acetaminophen (TYLENOL) 325 mg tablet 975 mg.      atorvastatin (LIPITOR) 40 mg tablet Take 1 tablet (40 mg total) by mouth daily. 90 tablet 3    clindamycin (Cleocin HCL) 150 mg capsule Take 1 capsule (150 mg total) by mouth See admin instr. Take 4 tablets ( 600mg ) one hour prior to dentist 4 capsule 6    metoprolol succinate XL (TOPROL-XL) 25 mg 24 hr tablet Take 1 tablet (25 mg total) by mouth daily. 90 tablet 3    warfarin (COUMADIN) 5 mg tablet Monday and Friday 10 MG   TWTHSS 5  tablet 3     No current facility-administered medications for this visit.       Review of Systems   Constitutional: Negative for chills and fever.   HENT: Negative for congestion, hearing loss, rhinorrhea and sore throat.    Eyes: Negative for visual disturbance.   Respiratory: Negative for cough and shortness of breath.    Cardiovascular: Positive for chest pain (resolved). Negative for palpitations.   Gastrointestinal: Negative for abdominal pain, blood in stool, constipation, diarrhea, nausea and vomiting.   Genitourinary: Negative for dysuria, frequency and testicular pain.   Musculoskeletal: Negative for arthralgias and back pain.   Skin: Negative for rash.   Allergic/Immunologic: Negative for environmental allergies and food allergies.   Neurological: Negative for  dizziness, weakness and numbness.   Hematological: Negative for adenopathy.     Objective   Vitals:    10/05/23 1304   BP: 122/82   BP Location: Left upper arm   Patient Position: Sitting   Pulse: 85   Temp: 36.2 °C (97.2 °F)   TempSrc: Temporal   SpO2: 96%   Weight: 110 kg (243 lb)   Height: 1.829 m (6')       Physical Exam  Vitals and nursing note reviewed.   Constitutional:       Appearance: Normal appearance. He is well-developed.   HENT:      Head: Normocephalic and atraumatic.      Right Ear: Tympanic membrane and ear canal normal.      Left Ear: Tympanic membrane and ear canal normal.      Nose: Nose normal.   Eyes:      Conjunctiva/sclera: Conjunctivae normal.      Pupils: Pupils are equal, round, and reactive to light.   Neck:      Thyroid: No thyroid mass or thyromegaly.   Cardiovascular:      Rate and Rhythm: Normal rate and regular rhythm.      Pulses: Normal pulses.           Radial pulses are 2+ on the right side and 2+ on the left side.      Heart sounds: S1 normal and S2 normal. No murmur heard.     No friction rub. No gallop.   Pulmonary:      Effort: Pulmonary effort is normal.      Breath sounds: Normal breath sounds. No wheezing, rhonchi or rales.   Abdominal:      General: Bowel sounds are normal.      Palpations: Abdomen is soft.      Tenderness: There is no abdominal tenderness. There is no guarding or rebound.   Musculoskeletal:         General: Normal range of motion.      Right lower leg: No edema.      Left lower leg: No edema.   Lymphadenopathy:      Cervical: No cervical adenopathy.   Neurological:      Mental Status: He is alert and oriented to person, place, and time.      Cranial Nerves: No cranial nerve deficit.   Psychiatric:         Speech: Speech normal.         Behavior: Behavior normal.         Judgment: Judgment normal.         Procedures    Health Maintenance   Topic Date Due    Hepatitis B Vaccines (1 of 3 - 3-dose series) Never done    Colorectal Cancer Screening  Never  done    HIV Screening  Never done    Hepatitis C Screening  Never done    COVID-19 Vaccine (2 - Moderna series) 04/03/2021    Zoster Vaccine (2 of 2) 11/30/2023    Depression Screening  10/05/2024    DTaP, Tdap, and Td Vaccines (2 - Td or Tdap) 12/18/2025    Influenza Vaccine  Completed    Meningococcal ACWY  Aged Out    HIB Vaccines  Aged Out    IPV Vaccines  Aged Out    HPV Vaccines  Aged Out    Pneumococcal  Aged Out       Lab Results   Component Value Date    WBC 4.6 12/18/2015    HGB 13.6 12/18/2015    HCT 42.6 12/18/2015     12/18/2015    CHOL 131 07/06/2023    TRIG 65 07/06/2023    HDL 53 07/06/2023    ALT 74 (H) 07/06/2023    AST 64 (H) 07/06/2023     07/06/2023    K 4.5 07/06/2023     07/06/2023    CREATININE 0.86 07/06/2023    BUN 11 07/06/2023    CO2 22 07/06/2023    TSH 1.230 12/18/2015    INR 1.9 10/04/2023    HGBA1C 5.4 12/18/2015         Assessment   Problem List Items Addressed This Visit        Other    Encounter for general adult medical examination without abnormal findings - Primary     Adult Male  Complaints - None  Diet - Poor  Activity - None   Tobacco Use - None  EtOH Use - None   Drug Use - None   Sexual Activity -   PMH - MVR s/p replacement, HLD, PFO, Pulm HTN  FHX - D CAD DM; MGM CAD  Labs Ordered - CBC CMP PSA  Immunizations Recommended - Flu, Shingles  Preventative Care Recommended - FIT  Refused - Colonoscopy  Follow up - 1 y           Relevant Orders    CBC and Differential    Comprehensive metabolic panel    Influenza vaccine quadrivalent preservative free 6 mon and older IM (FluLaval) (Completed)    Shingrix (Completed)   Other Visit Diagnoses     Screening for prostate cancer        Relevant Orders    PSA    Encounter for FIT (fecal immunochemical test) screening        Relevant Orders    FIT Test              Suresh Holland MD  10/5/2023

## 2023-10-06 LAB
ALBUMIN SERPL-MCNC: 4.5 G/DL (ref 3.6–5.1)
ALBUMIN/GLOB SERPL: 1.7 (CALC) (ref 1–2.5)
ALP SERPL-CCNC: 83 U/L (ref 35–144)
ALT SERPL-CCNC: 19 U/L (ref 9–46)
AST SERPL-CCNC: 20 U/L (ref 10–35)
BASOPHILS # BLD AUTO: 29 CELLS/UL (ref 0–200)
BASOPHILS NFR BLD AUTO: 0.6 %
BILIRUB SERPL-MCNC: 1.1 MG/DL (ref 0.2–1.2)
BUN SERPL-MCNC: 22 MG/DL (ref 7–25)
BUN/CREAT SERPL: NORMAL (CALC) (ref 6–22)
CALCIUM SERPL-MCNC: 9.2 MG/DL (ref 8.6–10.3)
CHLORIDE SERPL-SCNC: 109 MMOL/L (ref 98–110)
CO2 SERPL-SCNC: 23 MMOL/L (ref 20–32)
CREAT SERPL-MCNC: 0.84 MG/DL (ref 0.7–1.3)
EGFRCR SERPLBLD CKD-EPI 2021: 106 ML/MIN/1.73M2
EOSINOPHIL # BLD AUTO: 132 CELLS/UL (ref 15–500)
EOSINOPHIL NFR BLD AUTO: 2.7 %
ERYTHROCYTE [DISTWIDTH] IN BLOOD BY AUTOMATED COUNT: 13.6 % (ref 11–15)
GLOBULIN SER CALC-MCNC: 2.6 G/DL (CALC) (ref 1.9–3.7)
GLUCOSE SERPL-MCNC: 88 MG/DL (ref 65–99)
HCT VFR BLD AUTO: 40.8 % (ref 38.5–50)
HGB BLD-MCNC: 13.8 G/DL (ref 13.2–17.1)
LYMPHOCYTES # BLD AUTO: 1333 CELLS/UL (ref 850–3900)
LYMPHOCYTES NFR BLD AUTO: 27.2 %
MCH RBC QN AUTO: 30.7 PG (ref 27–33)
MCHC RBC AUTO-ENTMCNC: 33.8 G/DL (ref 32–36)
MCV RBC AUTO: 90.7 FL (ref 80–100)
MONOCYTES # BLD AUTO: 466 CELLS/UL (ref 200–950)
MONOCYTES NFR BLD AUTO: 9.5 %
NEUTROPHILS # BLD AUTO: 2940 CELLS/UL (ref 1500–7800)
NEUTROPHILS NFR BLD AUTO: 60 %
PLATELET # BLD AUTO: 196 THOUSAND/UL (ref 140–400)
PMV BLD REES-ECKER: 11 FL (ref 7.5–12.5)
POTASSIUM SERPL-SCNC: 4.2 MMOL/L (ref 3.5–5.3)
PROT SERPL-MCNC: 7.1 G/DL (ref 6.1–8.1)
PSA SERPL-MCNC: 0.33 NG/ML
RBC # BLD AUTO: 4.5 MILLION/UL (ref 4.2–5.8)
SODIUM SERPL-SCNC: 142 MMOL/L (ref 135–146)
WBC # BLD AUTO: 4.9 THOUSAND/UL (ref 3.8–10.8)

## 2023-10-11 ENCOUNTER — ANTICOAGULATION VISIT (OUTPATIENT)
Dept: CARDIOLOGY | Facility: CLINIC | Age: 48
End: 2023-10-11
Payer: COMMERCIAL

## 2023-10-11 DIAGNOSIS — Z95.2 HISTORY OF MITRAL VALVE REPLACEMENT WITH MECHANICAL VALVE: Primary | ICD-10-CM

## 2023-10-11 LAB — INTERNATIONAL NORMALIZATION RATIO, POC: 3.2

## 2023-10-11 PROCEDURE — 85610 PROTHROMBIN TIME: CPT | Performed by: NURSE PRACTITIONER

## 2023-10-25 LAB — INR PPP: 4

## 2023-10-26 ENCOUNTER — ANTICOAGULATION VISIT (OUTPATIENT)
Dept: CARDIOLOGY | Facility: CLINIC | Age: 48
End: 2023-10-26

## 2023-10-26 DIAGNOSIS — Z95.2 HISTORY OF MITRAL VALVE REPLACEMENT WITH MECHANICAL VALVE: Primary | ICD-10-CM

## 2023-10-26 NOTE — PROGRESS NOTES
Pt returned my phone call. He notes increase in wine the past week which would explain increase in INR. Oh his own he took 10 mg yesterday. Pt will decrease to 10 mg TWTH 15 mg AOD and repeat in one week.

## 2023-10-26 NOTE — PROGRESS NOTES
I called and left a  for patient to discuss. I saw that he had canceled his appointment for today stating he received his [employer] monitor. It also is noted in the cancellation notes that his INR was 4.0 and needing to speak with someone. I did not receive a fax with results. I called patient to discuss adjustments needing to be made. Awaiting return phone call.

## 2023-11-01 LAB — INR PPP: 1.9

## 2023-11-02 ENCOUNTER — ANTICOAGULATION VISIT (OUTPATIENT)
Dept: CARDIOLOGY | Facility: CLINIC | Age: 48
End: 2023-11-02
Payer: COMMERCIAL

## 2023-11-02 DIAGNOSIS — Z95.2 HISTORY OF MITRAL VALVE REPLACEMENT WITH MECHANICAL VALVE: Primary | ICD-10-CM

## 2023-11-02 NOTE — PROGRESS NOTES
I called and spoke with patient. He tells me his INR was 1.9 yesterday. He tells me he knows why it was low. He states his wife places his pills for the week in a weekly pill dispenser. He did not communicate with her what him and I discussed last week so she placed 10 mg daily. After seeing his reading last night he took 15 mg.    Moving forward patient will take 10 mg Tuesday and Thurday, 15 mg all other days and repeat INR in one week. He verbalized understanding.

## 2023-11-09 ENCOUNTER — ANTICOAGULATION VISIT (OUTPATIENT)
Dept: CARDIOLOGY | Facility: CLINIC | Age: 48
End: 2023-11-09
Payer: COMMERCIAL

## 2023-11-09 DIAGNOSIS — Z95.2 HISTORY OF MITRAL VALVE REPLACEMENT WITH MECHANICAL VALVE: Primary | ICD-10-CM

## 2023-11-09 LAB — INTERNATIONAL NORMALIZATION RATIO, POC: 2.4

## 2023-11-09 PROCEDURE — 85610 PROTHROMBIN TIME: CPT | Performed by: NURSE PRACTITIONER

## 2023-11-09 NOTE — PROGRESS NOTES
INR 2.4. Pt will remain on 10 mg Tuesday and Thursday, 15 mg AOD. He was reminded to attempt to keep consistent with lifestyle. ie alcohol consumption. He will repeat in one week on current regimen.

## 2023-11-16 ENCOUNTER — DOCUMENTATION (OUTPATIENT)
Dept: CARDIOLOGY | Facility: CLINIC | Age: 48
End: 2023-11-16
Payer: COMMERCIAL

## 2023-11-16 ENCOUNTER — ANTICOAGULATION VISIT (OUTPATIENT)
Dept: CARDIOLOGY | Facility: CLINIC | Age: 48
End: 2023-11-16
Payer: COMMERCIAL

## 2023-11-16 DIAGNOSIS — Z95.2 HISTORY OF MITRAL VALVE REPLACEMENT WITH MECHANICAL VALVE: Primary | ICD-10-CM

## 2023-11-16 LAB — INTERNATIONAL NORMALIZATION RATIO, POC: 2.3

## 2023-11-16 PROCEDURE — 85610 PROTHROMBIN TIME: CPT | Performed by: NURSE PRACTITIONER

## 2023-11-16 NOTE — PROGRESS NOTES
INR 2.3 Pt will increase to 10 mg Tuesdays only, 15 mg AOD and repeat INR in 2 weeks. He verbalized understanding.

## 2023-11-16 NOTE — PROGRESS NOTES
Spoke with Nimesh, confirmed that they have [facility] fax number for patient's INR results. They are currenlty being sent to [facility]. INR results should begin to come to our fax at [facility].

## 2023-11-27 ENCOUNTER — TELEPHONE (OUTPATIENT)
Dept: FAMILY MEDICINE | Facility: CLINIC | Age: 48
End: 2023-11-27
Payer: COMMERCIAL

## 2023-11-27 NOTE — TELEPHONE ENCOUNTER
Spoke to patient. He has had a cold for 2 days. Sore throat, congestion. No F/C. Today developed lower back and RLQ pain. Noticed blood in urine as well.  He will come in for an appt tomorrow. He will also COVID test before. Will not stop Coumadin.  Please call patient and schedule for visit tomorrow.

## 2023-11-29 ENCOUNTER — TELEPHONE (OUTPATIENT)
Dept: FAMILY MEDICINE | Facility: CLINIC | Age: 48
End: 2023-11-29
Payer: COMMERCIAL

## 2023-11-29 NOTE — TELEPHONE ENCOUNTER
Spoke to patient  Symptoms started 4 d ago  Tested positive 2 d ago  Congestion cough  SQ guidelines reviewed  WS symptoms reviewed  Discussed Paxlovid - treatment and side effects  Defer Paxlovid - feeling better today compared to yesterday, prefers not to interfere with Warfarin  Fluids, rest  Tylenol  Mucinex, Flonase

## 2023-11-30 ENCOUNTER — ANTICOAGULATION VISIT (OUTPATIENT)
Dept: CARDIOLOGY | Facility: CLINIC | Age: 48
End: 2023-11-30
Payer: COMMERCIAL

## 2023-11-30 DIAGNOSIS — Z95.2 HISTORY OF MITRAL VALVE REPLACEMENT WITH MECHANICAL VALVE: Primary | ICD-10-CM

## 2023-11-30 LAB — INR PPP: 4.2

## 2023-12-07 ENCOUNTER — ANTICOAGULATION VISIT (OUTPATIENT)
Dept: CARDIOLOGY | Facility: CLINIC | Age: 48
End: 2023-12-07
Payer: COMMERCIAL

## 2023-12-07 DIAGNOSIS — Z95.2 HISTORY OF MITRAL VALVE REPLACEMENT WITH MECHANICAL VALVE: Primary | ICD-10-CM

## 2023-12-07 LAB — INTERNATIONAL NORMALIZATION RATIO, POC: 4.3

## 2023-12-07 PROCEDURE — 85610 PROTHROMBIN TIME: CPT | Performed by: NURSE PRACTITIONER

## 2023-12-07 NOTE — PROGRESS NOTES
I called patient to check in regarding his INR thst was due to be check yesterday evening. He tells me he is back at work, feeling better post COVID infection but has not checked his INR this week. He will check it tonight when he gets home. He denies abn b/b.

## 2023-12-11 NOTE — PROGRESS NOTES
Called patient today to discuss INR. Our office did not receive results Friday however patient reports he checked his INR on Thursday night 12*7 and obtained 4.3. He held his dose on 12/7. Pt recovering from COVID infection. Denies medication or dietary changes. He will decrease to 10 mg three times weeky, 15 mg all other day with repeat this Wednesday night to discuss Thursday AM.

## 2023-12-14 ENCOUNTER — ANTICOAGULATION VISIT (OUTPATIENT)
Dept: CARDIOLOGY | Facility: CLINIC | Age: 48
End: 2023-12-14
Payer: COMMERCIAL

## 2023-12-14 DIAGNOSIS — Z95.2 HISTORY OF MITRAL VALVE REPLACEMENT WITH MECHANICAL VALVE: Primary | ICD-10-CM

## 2023-12-14 LAB — INTERNATIONAL NORMALIZATION RATIO, POC: 3

## 2023-12-14 PROCEDURE — 85610 PROTHROMBIN TIME: CPT | Performed by: NURSE PRACTITIONER

## 2023-12-14 NOTE — PROGRESS NOTES
Spoke with patient. INR 3.0. He will continue on 10 mg of coumadin three times weekly, 15 mg AOD. He will repeat in two weeks.

## 2023-12-28 ENCOUNTER — DOCUMENTATION (OUTPATIENT)
Dept: CARDIOLOGY | Facility: CLINIC | Age: 48
End: 2023-12-28
Payer: COMMERCIAL

## 2023-12-28 NOTE — PROGRESS NOTES
I called and spoke with patient. He forgot to check his INR last night to review today. He will check tonight.

## 2023-12-29 ENCOUNTER — TELEPHONE (OUTPATIENT)
Dept: CARDIOLOGY | Facility: CLINIC | Age: 48
End: 2023-12-29
Payer: COMMERCIAL

## 2023-12-29 ENCOUNTER — ANTICOAGULATION VISIT (OUTPATIENT)
Dept: CARDIOLOGY | Facility: CLINIC | Age: 48
End: 2023-12-29
Payer: COMMERCIAL

## 2023-12-29 DIAGNOSIS — Z95.2 HISTORY OF MITRAL VALVE REPLACEMENT WITH MECHANICAL VALVE: Primary | ICD-10-CM

## 2023-12-29 NOTE — TELEPHONE ENCOUNTER
Called and spoke with patient who stated that he forgot again to do his blood work and that he would try to remember and do his INR tonight.

## 2024-01-01 LAB — INR PPP: 5.6

## 2024-01-02 NOTE — PROGRESS NOTES
Called patient to find out if he did his INR yet.  Patient stated that he did do his INR yesterday, which was 5.6, patient did take his Coumadin yesterday, which was 15 mg Coumadin.    Patient stated that he knows why his INR was high this time.  Patient stated that during the holiday he drank ETOH during these past two weeks.  Patient stated that he is back to his normal diet and has stopped drinking.  Told patient to hold dose tonight, which is 10 of Coumadin and then resume back to his normal regimen of 15 mg on Wednesday, and Thursday and then 10 mg on Friday.  Told patient to recheck his INR on Friday morning and that I would call him on Friday mid morning to afternoon.  Patient verbally understands.  Also reviewed all the above information with patient.

## 2024-01-05 ENCOUNTER — ANTICOAGULATION VISIT (OUTPATIENT)
Dept: CARDIOLOGY | Facility: CLINIC | Age: 49
End: 2024-01-05
Payer: COMMERCIAL

## 2024-01-05 DIAGNOSIS — Z95.2 HISTORY OF MITRAL VALVE REPLACEMENT WITH MECHANICAL VALVE: Primary | ICD-10-CM

## 2024-01-05 LAB — INR PPP: 3

## 2024-01-05 NOTE — PROGRESS NOTES
Called and spoke with patient to see if he checked his INR today, which he did and his INR was 3.0.  Patient denies any bruising, bleeding or changes in diet or medication changes.  Told patient to check his INR in two weeks.  Patient will check INR on Wednesday 01/17/2023.  Told patient to call us back if he has any questions or concerns.

## 2024-01-18 ENCOUNTER — DOCUMENTATION (OUTPATIENT)
Dept: CARDIOLOGY | Facility: CLINIC | Age: 49
End: 2024-01-18
Payer: COMMERCIAL

## 2024-01-18 LAB — INR PPP: 1.9

## 2024-01-22 ENCOUNTER — DOCUMENTATION (OUTPATIENT)
Dept: CARDIOLOGY | Facility: CLINIC | Age: 49
End: 2024-01-22
Payer: COMMERCIAL

## 2024-01-24 ENCOUNTER — ANTICOAGULATION VISIT (OUTPATIENT)
Dept: CARDIOLOGY | Facility: CLINIC | Age: 49
End: 2024-01-24
Payer: COMMERCIAL

## 2024-01-24 DIAGNOSIS — Z95.2 HISTORY OF MITRAL VALVE REPLACEMENT WITH MECHANICAL VALVE: Primary | ICD-10-CM

## 2024-01-24 NOTE — PROGRESS NOTES
"I called patient again today and was able to speak with him. I spoke with Nimesh last week and was told patient's account was correct and nothing needed to be done on our end however our office is still not receiving patient's INR results when he checks them. I will call again today to discuss with Nimesh.    He tells me he checked his INR on 1/18/2024 and 1.9 was obtained. Pt tells me that after his 1/1/2024 reading of 5.6 (believed to be alcohol related) he \"freaked out\" and began eating increased amounts of green leafy vegetables as well as stopped drinking alcohol. Patient and I again discussed the importance of consistency with lifestyle. That eating/drinking certain food/drink in excess then limiting them will cause dramatic fluctuations in INR. This has been discussed before. Pt provided with education packets regarding coumadin. Pt verbalized understanding of this again. I asked him to reach out to our office in the future to discuss any questions he may have.    Patient will check his inr tonight. I will call him tomr with result and how to move forward with coumadin since he has had fluctuations in lifestyle. For now he will remains on 15 mg 4x weekly, 10 mg 3x weekly.  "

## 2024-01-25 ENCOUNTER — ANTICOAGULATION VISIT (OUTPATIENT)
Dept: CARDIOLOGY | Facility: CLINIC | Age: 49
End: 2024-01-25
Payer: COMMERCIAL

## 2024-01-25 DIAGNOSIS — Z95.2 HISTORY OF MITRAL VALVE REPLACEMENT WITH MECHANICAL VALVE: Primary | ICD-10-CM

## 2024-01-25 LAB — INTERNATIONAL NORMALIZATION RATIO, POC: 3.4

## 2024-01-25 PROCEDURE — 85610 PROTHROMBIN TIME: CPT | Performed by: NURSE PRACTITIONER

## 2024-01-25 NOTE — PROGRESS NOTES
Spoke with patient. INR 3.4 with goal of 2.5-3.5. Pt will remain on current dose of coumadin and repeat on 2/7/2024.

## 2024-01-29 DIAGNOSIS — Z95.2 HISTORY OF MITRAL VALVE REPLACEMENT WITH MECHANICAL VALVE: ICD-10-CM

## 2024-01-29 RX ORDER — WARFARIN 10 MG/1
TABLET ORAL
Qty: 270 TABLET | Refills: 3 | Status: SHIPPED | OUTPATIENT
Start: 2024-01-29 | End: 2025-02-12

## 2024-02-06 ENCOUNTER — ANTICOAGULATION VISIT (OUTPATIENT)
Dept: CARDIOLOGY | Facility: CLINIC | Age: 49
End: 2024-02-06
Payer: COMMERCIAL

## 2024-02-06 DIAGNOSIS — Z95.2 HISTORY OF MITRAL VALVE REPLACEMENT WITH MECHANICAL VALVE: Primary | ICD-10-CM

## 2024-02-06 LAB — INTERNATIONAL NORMALIZATION RATIO, POC: 2.2

## 2024-02-06 PROCEDURE — 85610 PROTHROMBIN TIME: CPT | Performed by: NURSE PRACTITIONER

## 2024-02-07 ENCOUNTER — ANTICOAGULATION VISIT (OUTPATIENT)
Dept: CARDIOLOGY | Facility: CLINIC | Age: 49
End: 2024-02-07

## 2024-02-07 DIAGNOSIS — Z95.2 HISTORY OF MITRAL VALVE REPLACEMENT WITH MECHANICAL VALVE: Primary | ICD-10-CM

## 2024-02-07 NOTE — PROGRESS NOTES
Referred by: Brooklynn Yancey MD; Medical Diagnosis (from order):      Physical Therapy -  Progress Note -  Daily Treatment Note    Visit:  13   Next referring provider appointment: 1/3/2020    Diagnosis Precautions: Following guidelines for biceps tenodesis    SUBJECTIVE                                                                                                             Patient reports that she followed up with MD's office states that recovery is going well. Patient continues to have pain and discomfort sleeping.   Functional Change: Patient able to tolerate bicep curl    Pain / Symptoms:  Pain rating (out of 10): Current: 0 ; Best: 2; Worst: 6  Stiffness rating (out of 10): Current: 4  Location: Right anterolateral arm pain/upper trap soreness/generalized soreness/sore across collar bone   Quality / Description: ache.  Alleviating Factors: change in position.   Progression since onset: improved    OBJECTIVE                                                                                                                     Observation:   Shoulder:   Right Scapula: upwardly rotated, anteriorly tipped, protracted and winging  Range of Motion (ROM) (norms in parentheses, measurement in degrees unless noted):   Shoulder Flexion (180): Right: Active: 100 pain  Passive: 130   Shoulder Abduction (180):Right: Active: 90 pain Passive: 110   Shoulder External Rotation at 45°: Right: Passive: 45 and pain  Comments / Details: IR to belly; slight protraction/improved following STM      Strength  out of 5 unless otherwise indicated, standard test position unless noted, lbs tested with hand held dynamometer:   Shoulder Flexion: Left: 4+ Right: 3+  Shoulder Abduction: Left: 4+ Right: 3+  Shoulder Internal Rotation: Left (at 90°): 4+ Right (at 90°): 3+  Shoulder External Rotation:Left (at 90°): 4+ Right (at 90°): 3+  Serratus Anterior Left: 4+; Serratus Anterior Right: 3        1  TREATMENT                                        02/07/2024 @ 10:35 AM Called and spoke with patient and made patient aware that we received his [employer] INR Finger stick which was 2.2, patient's goal range is 2.5 to 3.5.  Patient denies any bruising or bleeding, or medication changes.  Patient stated that he has been dieting lately to try to lose weight.  Patient took additional 5 mg of Coumadin yesterday to total in 15 mg of Coumadin.  Told patient that I would like him to check in two weeks on 02/20/2024.  Also told patient that I would like him to continue with current medication regimine of 10 mg of Coumadin on Sunday, Tuesday, and Thursday, and 15 mg of Coumadin on Monday, Wednesday, Friday, and Saturday.  Patient verbally understands all above information.                                                                              Therapeutic Exercise:  Standing Pulley flexion, scaption- standing (less painful)   Swiss ball AAROM  Shoulder flexion/scaption with posterior chair slides  Shoulder isometrics /flexion/abduction 3 x 10 each direction   AAROM with swiss ball flexion, scaption, 3 x 10 each direction   Exercises Shoulder Internal Rotation with Resistance - 30 reps - 1 sets - 2 seconds hold - 1x daily - 7x weekly   Shoulder ER with Resistance - 30 reps - 1 sets - 1x daily - 7x weekly   Bicep curl with 1# -4# 2 x 10  Standing rows with resistance band 3 x 10  SA supine punches 3 x 10  Manual Therapy:  STM to right pec minor, lateral arm, posterior cuff, upper trap and axillary musculature  Manual therapy completed to improve soft tissue and joint mobility, decrease muscle spasm/guarding, decrease pain, and intervene in dysfunctional healing cycle.  PA mobilization GHJ 2x30 seconds grade I-II  Grade I-II oscillations to promote relaxation  Grade II physiological flexion, scaption abduction, and ER mobilizations          Skilled input: verbal instruction/cues, tactile instruction/cues, facilitation, posture correction and inhibition    Home Exercise Program: (*above indicates provided as part of home exercise program)   Access Code: [email or web address]   URL: [email or web address]   Date: 12/16/2019   Prepared by: Alexander Avila     Exercises  Supine Shoulder External Rotation in 45 Degrees Abduction AAROM with Dowel - 5 reps - 1 sets - 10 seconds hold - 3x daily - 7x weekly  Seated Shoulder Flexion Towel Slide at Table Top - 5 reps - 1 sets - 10 seconds hold - 3x daily - 7x weekly  Standing Isometric Shoulder External Rotation with Doorway and Towel Roll - 10 reps - 3 sets - 5 hold - 1x daily - 7x weekly  Standing Isometric Shoulder Internal Rotation at Doorway - 10 reps - 3 sets - 5 hold - 2x daily - 7x weekly     Access Code: [email or web address]   URL:  [email or web address]   Date: 12/26/2019   Prepared by: Celia Landrum      Exercises Shoulder Internal Rotation with Resistance - 30 reps - 1 sets - 2 seconds hold - 1x daily - 7x weekly   George: shoulder ER - 30 reps - 1 sets - 1x daily - 7x weekly    Access Code: [email or web address]   Exercises Shoulder Internal Rotation with Resistance - 30 reps - 1 sets - 2 seconds hold - 1x daily - 7x weekly   George: shoulder ER - 30 reps - 1 sets - 1x daily - 7x weekly         ASSESSMENT                                                                                                                 Patient demonstrating improvement in ROM  Due to now being able to actively lift arm. Patient also able to tolerate strength progression well. Patient continues to demonstrated difficulty with active range of motion and decreased scapulahumoral rhythm that is limiting patient's ability to perform ADLs. Patient will continue to benefit from skilled physical therapy to address deficits.     Goals not met due to MD guidelines/restrictions  Continue 2x/week for 8 additional weeks with tapering when appropriate.  To date the patient has made gains as expected as reported. Patient will continue to benefit from skilled care as outlined. and Patient continues to have impairments and functional deficits as noted.    Pain/symptoms after session: 1  Patient Education:   Results of above outlined education: Verbalizes understanding and Demonstrates understanding   PLAN                                                                                                                         Updates to plan of care: modify plan of care    Suggestions for next session as indicated: Progress per plan of care with continuation of biceps tenodesis guideline (Care plan), pulleys as warm-up in future pending tolerance from last session, continued therapy including initiation of posterior capsule stretch if painfree anteriorly and potential to  begin lower extremity biking for endurance/blood flow.   Beciep PRE's  Review HEP and progress as appropriate  MT/STM PROM GHJ  Check Authorization        GOALS                                                                                                                       Long Term Goals: To be met by end of plan of care:      Home Exercise Program: Independent with progressed and modified home exercise program (HEP)      Status:  Progressing/ongoing  Status Details: Goals not met due to MD guidelines/restrictions    Pain: Decrease pain/symptoms to 3     Status:  Progressing/ongoing  Strength: Improve involved strength to  4+, on the right shoulder    Status:  Progressing/ongoing  Status Details: Goals not met due to MD guidelines/restrictions    Dressing: Complete upper body dressing: independent and with reported manageable/tolerable difficulty     Status:  Progressing/ongoing  Grooming/Hygiene: Complete grooming/hygiene tasks independent and with reported manageable/tolerable difficulty     Status:  Progressing/ongoing  Reaching: Reach overhead, at and above shoulder height, out to side, behind back and with reported manageable/tolerable difficulty     Status:  Progressing/ongoing  Status Details: Goals not met due to MD guidelines/restrictions      Procedures and total treatment time documented Time Entry flowsheet.

## 2024-02-14 ENCOUNTER — ANTICOAGULATION VISIT (OUTPATIENT)
Dept: CARDIOLOGY | Facility: CLINIC | Age: 49
End: 2024-02-14
Payer: COMMERCIAL

## 2024-02-14 DIAGNOSIS — Z95.2 HISTORY OF MITRAL VALVE REPLACEMENT WITH MECHANICAL VALVE: Primary | ICD-10-CM

## 2024-02-14 LAB — INR PPP: 2.5

## 2024-02-14 NOTE — PROGRESS NOTES
Pt checked his INR today bc he hit his arm and developed a bruise. Bruise is stable, has not grown in size. Denies abn b/b. INR 2.5. He will remain on 10 mg 3x weekly 15 mg 4x weekly and repeat in two weeks.

## 2024-02-29 ENCOUNTER — ANTICOAGULATION VISIT (OUTPATIENT)
Dept: CARDIOLOGY | Facility: CLINIC | Age: 49
End: 2024-02-29
Payer: COMMERCIAL

## 2024-02-29 DIAGNOSIS — Z95.2 HISTORY OF MITRAL VALVE REPLACEMENT WITH MECHANICAL VALVE: Primary | ICD-10-CM

## 2024-02-29 LAB — INR PPP: 2.7

## 2024-03-12 ENCOUNTER — TELEPHONE (OUTPATIENT)
Dept: FAMILY MEDICINE | Facility: CLINIC | Age: 49
End: 2024-03-12

## 2024-03-12 ENCOUNTER — CLINICAL SUPPORT (OUTPATIENT)
Dept: FAMILY MEDICINE | Facility: CLINIC | Age: 49
End: 2024-03-12
Payer: COMMERCIAL

## 2024-03-12 PROCEDURE — 90471 IMMUNIZATION ADMIN: CPT | Performed by: FAMILY MEDICINE

## 2024-03-12 PROCEDURE — 90750 HZV VACC RECOMBINANT IM: CPT | Performed by: FAMILY MEDICINE

## 2024-03-19 LAB — INR PPP: 3.2

## 2024-03-20 ENCOUNTER — ANTICOAGULATION VISIT (OUTPATIENT)
Dept: CARDIOLOGY | Facility: CLINIC | Age: 49
End: 2024-03-20
Payer: COMMERCIAL

## 2024-03-20 DIAGNOSIS — Z95.2 HISTORY OF MITRAL VALVE REPLACEMENT WITH MECHANICAL VALVE: Primary | ICD-10-CM

## 2024-04-02 DIAGNOSIS — E66.9 OBESITY (BMI 30-39.9): ICD-10-CM

## 2024-04-02 DIAGNOSIS — Z82.49 FAMILY HISTORY OF ISCHEMIC HEART DISEASE: Primary | ICD-10-CM

## 2024-04-04 LAB — INR PPP: 4.7

## 2024-04-05 ENCOUNTER — ANTICOAGULATION VISIT (OUTPATIENT)
Dept: CARDIOLOGY | Facility: CLINIC | Age: 49
End: 2024-04-05
Payer: COMMERCIAL

## 2024-04-05 DIAGNOSIS — Z95.2 HISTORY OF MITRAL VALVE REPLACEMENT WITH MECHANICAL VALVE: Primary | ICD-10-CM

## 2024-04-05 NOTE — PROGRESS NOTES
Patient's [facility] of our office.  Patient's INR was 4.7, which was collected yesterday 04/04/2024, and we received result today 04/05/2024.  Told patient that if he did not hold his Coumadin yesterday that I would like for him to hold his Coumadin tonight 04/05/2024, and then resume his regular dose schedule which is 10 mg every Sunday, Tuesday, and Thursday, and 15 mg all other days.  Told patient to call us to let us if he has any bruising/bleeding, changes in medication or diet.  Also reviewed things that would elevate patient's INR.  Told patient that I would like for him to recheck his INR in one week on 04/11/2024.  Also told patient to call office back.    Patient called back and stated that he had family event this week and drank three times this week, which is not normal for patient.  Patient stated that he held last nights dose (04/04/2024), Told patient to esume his regular dose schedule which is 10 mg every Sunday, Tuesday, and Thursday, and 15 mg all other days.  Told patient that I would like for him to recheck his INR in one week on 04/11/2024.  Patient verbally understands and has no further questions at this time.

## 2024-04-08 NOTE — PROGRESS NOTES
Cardiology Note       Reason for visit: MVR      Alfonzo returns today for follow-up.  He tells me that he had gone to the emergency room couple months ago with some chest pain that they follow-up was musculoskeletal.  It has not returned.  He does tell me that every once in a while he can he feel a pop in his sternum.  He does not have any rocking motion on the sternum with palpation.  As you recall, he had a cardiac catheterization prior to his mitral valve replacement last year which did not show any obstructive coronary artery disease.    Clinically, Alfonzo has been doing well without any shortness of breath or chest discomfort.  There has not been any neurological or TIA-like symptoms.  No palpitations.    He tells me that he has been wearing his CPAP for sleep apnea but his wife has told him that he is snoring despite that and has not been to see his sleep physician in some time and I feel that he may need to have his mask adjusted.  He has also put on 5 pounds since his last visit which may be contributing to it.    Alfonzo has not had any bleeding issues related to his anticoagulation and has a home monitor which she calls in to our Coumadin clinic.            Past Medical History:   Diagnosis Date    ADHD 10/04/2019    COVID         Lipid disorder     Mitral valvular regurgitation 2023    PFO (patent foramen ovale) 2023    Pulmonary hypertension (CMS/HCC) 2023     Past Surgical History:   Procedure Laterality Date    CATARACT EXTRACTION Bilateral         KNEE ARTHROSCOPY W/ MENISCECTOMY Right     LIVER SURGERY      MITRAL VALVE REPLACEMENT Left 2023    left atrial appendage clip placement      Social History     Tobacco Use    Smoking status: Former     Packs/day: 1.00     Years: 15.00     Additional pack years: 0.00     Total pack years: 15.00     Types: Cigarettes     Quit date: 2019     Years since quittin.5    Smokeless tobacco: Never   Substance Use Topics     Alcohol use: Not Currently    Drug use: No      Family History   Problem Relation Age of Onset    Heart disease Biological Father     Diabetes Biological Father     Heart disease Paternal Grandmother      Penicillins  Current Outpatient Medications   Medication Instructions    acetaminophen (TYLENOL) 975 mg    atorvastatin (LIPITOR) 40 mg, oral, Daily    clindamycin (CLEOCIN HCL) 150 mg, oral, See admin instructions, Take 4 tablets ( 600mg ) one hour prior to dentist    metoprolol succinate XL (TOPROL-XL) 25 mg, oral, Daily    warfarin (COUMADIN) 10 mg tablet Take 10 mg three times weekly, take 15 mg 4 times weekly OR as directed by office based on INR result.          Review of Systems   Cardiovascular:  Positive for chest pain. Negative for dyspnea on exertion, irregular heartbeat, leg swelling, near-syncope, orthopnea, palpitations, paroxysmal nocturnal dyspnea and syncope.   Hematologic/Lymphatic: Does not bruise/bleed easily.   Neurological:  Negative for dizziness and light-headedness.      Objective    Vitals:    04/09/24 0932   BP: 118/80   Pulse: 72   Resp: 20      Wt Readings from Last 3 Encounters:   04/09/24 112 kg (248 lb)   04/09/24 112 kg (248 lb)   10/05/23 110 kg (243 lb)      Physical Exam  Neck:      Vascular: No carotid bruit or JVD.   Cardiovascular:      Rate and Rhythm: Normal rate and regular rhythm.      Pulses:           Carotid pulses are 2+ on the right side and 2+ on the left side.       Dorsalis pedis pulses are 2+ on the right side and 2+ on the left side.        Posterior tibial pulses are 2+ on the right side and 2+ on the left side.      Heart sounds: S1 normal and S2 normal. No murmur heard.     No friction rub. No gallop.      Comments: Crisp mechanical valve sounds.  No mitral insufficiency audible.  Pulmonary:      Effort: Pulmonary effort is normal.      Breath sounds: Normal breath sounds.   Musculoskeletal:      Right lower leg: No edema.      Left lower leg: No edema.    Skin:     General: Skin is warm and dry.   Neurological:      Mental Status: He is alert.   Psychiatric:         Behavior: Behavior normal.                   Transthoracic echocardiogram./9/2024.    Left Ventricle: Normal ventricle size. Normal wall thickness. Preserved systolic function. Estimated EF 60-65%. Abnormal septal motion consistent with post-operative status. Unable to assess diastolic filling pattern.    Right Ventricle: Mildly dilated ventricle size. Normal systolic function.    Left Atrium: Mildly dilated atrium.    Right Atrium: Mildly dilated atrium.    Mitral Valve: Trace transvalvular regurgitation. No stenosis. Mean gradient = 6.00. A mechanical prosthetic valve is present. The prosthetic valve appears normal.    Aortic Valve: Tricuspid valve. No regurgitation. No stenosis. Calculated dimensionless index = 0.61.    Tricuspid Valve: Normal structure. Mild regurgitation. Estimated RVSP = 28 mmHg.    Aorta: Aortic root normal. Sinuses of Valsalva normal-sized. Ascending aorta normal-sized.    IVC/SVC: Inferior vena cava is a small caliber vessel (<1.7cm). Inferior vena cava demonstrates normal respiratory collapse.    SBE prophylaxis according to the ACC/AHA guidelines is recommended.    Compared to the transesophageal echocardiogram dated 3/16/2023: Flail posterior leaflet is no longer present and as patient now has a mechanical valve in the mitral position.        Cardiac catheterization.  3/17/2023.  Widely patent epicardial coronary arteries.     Transesophageal echocardiogram.  3/17/2023.  Marked prolapse of the P2 scallop.  Flail posterior leaflet with severe mitral regurgitation.  Aortic valve is normal in appearance.  Tricuspid valve is normal.  RV systolic pressure 50 mmHg.  Left atrium normal in size.  No thrombus.  Left ventricle is normal in size and thickness.  Normal left ventricular systolic function.  No regional wall motion abnormalitie      ECG .  Normal sinus rhythm.  Normal  EKG     Assessment/Plan    Warfarin anticoagulation  Continue with warfarin.  INR goal 2.5-3.5.    History of mitral valve replacement with mechanical valve  I had Alfonzo get a follow-up echocardiogram today in the office which showed a normally functioning mechanical mitral valve.  There was trace transvalvular mitral insufficiency.  The mean gradient across the valve was 6 mmHg.    He had normal left ventricular systolic function with an ejection fraction of approximately 60%.  I reviewed the results of the echocardiogram with Alfonzo.    Alfonzo will continue on the warfarin.  I once again reminded him about the need for SBE prophylaxis.  He has clindamycin at home to take.  He is allergic to penicillin.    Mixed hyperlipidemia  Continue atorvastatin 40 mg daily.  Counseled Alfonzo on the need for diet, exercise and weight loss.    He has an appointment to see a nutritionist.    I also emphasized the Alfonzo the need to follow-up with his sleep doctor for adjustment of his CPAP machine.    I discussed with him the risks of sleep apnea on his heart especially the propensity to develop atrial fibrillation.  Given his mitral valve disease he is also more prone to this as well.    Alfonzo will be coming due for his day-to-day care.  Of asked her return to see me in 6 months time or sooner if there is a problem.  I will continue to keep you informed of his progress.  He will call with any questions or concerns in the interim.            Thank you for allowing me to participate in the care of this patient.  If you have any questions please don't hesitate to contact me.    I spent 30 minutes on this date of service performing the following activities: obtaining history, performing examination, entering orders, documenting, preparing for visit, obtaining / reviewing records, providing counseling and education and communicating results.     Luis Antonio Millan MD [facility]   4/9/2024  12:53 PM

## 2024-04-09 ENCOUNTER — OFFICE VISIT (OUTPATIENT)
Dept: CARDIOLOGY | Facility: CLINIC | Age: 49
End: 2024-04-09
Payer: COMMERCIAL

## 2024-04-09 ENCOUNTER — HOSPITAL ENCOUNTER (OUTPATIENT)
Dept: CARDIOLOGY | Facility: CLINIC | Age: 49
Discharge: HOME | End: 2024-04-09
Attending: INTERNAL MEDICINE
Payer: COMMERCIAL

## 2024-04-09 VITALS
HEIGHT: 72 IN | DIASTOLIC BLOOD PRESSURE: 80 MMHG | WEIGHT: 248 LBS | SYSTOLIC BLOOD PRESSURE: 118 MMHG | BODY MASS INDEX: 33.59 KG/M2

## 2024-04-09 VITALS
SYSTOLIC BLOOD PRESSURE: 118 MMHG | HEIGHT: 72 IN | RESPIRATION RATE: 20 BRPM | HEART RATE: 72 BPM | BODY MASS INDEX: 33.59 KG/M2 | DIASTOLIC BLOOD PRESSURE: 80 MMHG | WEIGHT: 248 LBS

## 2024-04-09 DIAGNOSIS — Z95.2 HISTORY OF MITRAL VALVE REPLACEMENT WITH MECHANICAL VALVE: ICD-10-CM

## 2024-04-09 DIAGNOSIS — E78.2 MIXED HYPERLIPIDEMIA: ICD-10-CM

## 2024-04-09 DIAGNOSIS — Z79.01 WARFARIN ANTICOAGULATION: ICD-10-CM

## 2024-04-09 DIAGNOSIS — Z95.2 HISTORY OF MITRAL VALVE REPLACEMENT WITH MECHANICAL VALVE: Primary | ICD-10-CM

## 2024-04-09 DIAGNOSIS — I27.20 PULMONARY HYPERTENSION (CMS/HCC): ICD-10-CM

## 2024-04-09 LAB
AORTIC ROOT ANNULUS: 3.1 CM
AORTIC VALVE MEAN VELOCITY: 0.89 M/S
AORTIC VALVE VELOCITY TIME INTEGRAL: 29.1 CM
ASCENDING AORTA: 3.1 CM
ATRIAL RATE: 64
AV MEAN GRADIENT: 4 MMHG
AV PEAK GRADIENT: 8 MMHG
AV PEAK VELOCITY-S: 1.41 M/S
AV VALVE AREA INDEX: 0.97
AV VALVE AREA: 1.92 CM2
AV VELOCITY RATIO: 0.61
AVA (VTI): 2.31 CM2
BSA FOR ECHO PROCEDURE: 2.39 M2
CUSP SEPARATION: 1.8 CM
DOP CALC LVOT STROKE VOLUME: 67.25 CM3
E WAVE DECELERATION TIME: 297 MS
E/A RATIO: 1.3
E/E' RATIO: 18.3
E/LAT E' RATIO: 10.9
EDV (BP): 122 CM3
EF (A4C): 57 %
EF A2C: 58.2 %
EJECTION FRACTION: 57.1 %
EST RIGHT VENT SYSTOLIC PRESSURE BY TRICUSPID REGURGITATION JET: 28 MMHG
ESV (BP): 52.3 CM3
FRACTIONAL SHORTENING: 36.25 %
INTERVENTRICULAR SEPTUM: 1.02 CM
LA ESV (BP): 86.4 CM3
LA ESV INDEX (A2C): 40.71 CM3/M2
LA ESV INDEX (BP): 36.15 CM3/M2
LA/AORTA RATIO: 1.29
LAAS-AP2: 27 CM2
LAAS-AP4: 21.1 CM2
LAD 2D: 4 CM
LALD A4C: 5.13 CM
LALD A4C: 5.95 CM
LAV-S: 97.3 CM3
LEFT ATRIUM VOLUME INDEX: 27.78 CM3/M2
LEFT ATRIUM VOLUME: 66.4 CM3
LEFT INTERNAL DIMENSION IN SYSTOLE: 3.2 CM (ref 4.62–7)
LEFT VENTRICLE DIASTOLIC VOLUME INDEX: 48.12 CM3/M2
LEFT VENTRICLE DIASTOLIC VOLUME: 115 CM3
LEFT VENTRICLE SYSTOLIC VOLUME INDEX: 20.71 CM3/M2
LEFT VENTRICLE SYSTOLIC VOLUME: 49.5 CM3
LEFT VENTRICULAR INTERNAL DIMENSION IN DIASTOLE: 5.02 CM (ref 8.01–11.13)
LEFT VENTRICULAR POSTERIOR WALL IN END DIASTOLE: 1.04 CM (ref 0.92–1.73)
LV DIASTOLIC VOLUME: 126 CM3
LV ESV (APICAL 2 CHAMBER): 52.7 CM3
LVAD-AP2: 37.7 CM2
LVAD-AP4: 35.5 CM2
LVAS-AP2: 22.7 CM2
LVAS-AP4: 21.3 CM2
LVEDVI(A2C): 52.72 CM3/M2
LVEDVI(BP): 51.05 CM3/M2
LVESVI(A2C): 22.05 CM3/M2
LVESVI(BP): 21.88 CM3/M2
LVLD-AP2: 9.42 CM
LVLD-AP4: 9.15 CM
LVLS-AP2: 8.3 CM
LVLS-AP4: 7.85 CM
LVOT 2D: 2.2 CM
LVOT A: 3.8 CM2
LVOT MG: 2 MMHG
LVOT MV: 0.55 M/S
LVOT PEAK VELOCITY: 0.91 M/S
LVOT PG: 3 MMHG
LVOT STROKE VOLUME INDEX: 28.14 ML/M2
LVOT VTI: 17.7 CM
MLH CV ECHO AVA INDEX VELOCITY RATIO: 0.8
MV E'TISSUE VEL-LAT: 0.14 M/S
MV E'TISSUE VEL-MED: 0.08 M/S
MV MEAN GRADIENT: 6 MMHG
MV PEAK A VEL: 1.22 M/S
MV PEAK E VEL: 1.54 M/S
MV PEAK GRADIENT: 14 MMHG
MV STENOSIS PRESSURE HALF TIME: 87 MS
MV VALVE AREA BY CONTINUITY EQUATION: 1.26 CM2
MV VALVE AREA P 1/2 METHOD: 2.53 CM2
MV VTI: 53.4 CM
P AXIS: 82
POSTERIOR WALL: 1.04 CM
PR INTERVAL: 192
QRS DURATION: 92
QT INTERVAL: 456
QTC CALCULATION(BAZETT): 470
R AXIS: 26
RAP: 3 MMHG
RVOT VMAX: 0.39 M/S
SEPTAL TISSUE DOPPLER FREE WALL LATE DIA VELOCITY (APICAL 4 CHAMBER VIEW): 0.09 M/S
T WAVE AXIS: 73
TR MAX PG: 25.4 MMHG
TRICUSPID VALVE PEAK REGURGITATION VELOCITY: 2.52 M/S
VENTRICULAR RATE: 64
Z-SCORE OF LEFT VENTRICULAR DIMENSION IN END DIASTOLE: -6.32
Z-SCORE OF LEFT VENTRICULAR DIMENSION IN END SYSTOLE: -4.54
Z-SCORE OF LEFT VENTRICULAR POSTERIOR WALL IN END DIASTOLE: -1

## 2024-04-09 PROCEDURE — 3008F BODY MASS INDEX DOCD: CPT | Performed by: INTERNAL MEDICINE

## 2024-04-09 PROCEDURE — 93306 TTE W/DOPPLER COMPLETE: CPT | Performed by: INTERNAL MEDICINE

## 2024-04-09 PROCEDURE — 93000 ELECTROCARDIOGRAM COMPLETE: CPT | Mod: XU | Performed by: INTERNAL MEDICINE

## 2024-04-09 PROCEDURE — 99214 OFFICE O/P EST MOD 30 MIN: CPT | Mod: 25 | Performed by: INTERNAL MEDICINE

## 2024-04-09 RX ORDER — METOPROLOL SUCCINATE 25 MG/1
25 TABLET, EXTENDED RELEASE ORAL DAILY
Qty: 90 TABLET | Refills: 3 | Status: SHIPPED | OUTPATIENT
Start: 2024-04-09 | End: 2024-07-22

## 2024-04-09 RX ORDER — ATORVASTATIN CALCIUM 40 MG/1
40 TABLET, FILM COATED ORAL DAILY
Qty: 90 TABLET | Refills: 3 | Status: SHIPPED | OUTPATIENT
Start: 2024-04-09 | End: 2024-08-19 | Stop reason: SDUPTHER

## 2024-04-09 ASSESSMENT — ENCOUNTER SYMPTOMS
ORTHOPNEA: 0
PALPITATIONS: 0
DYSPNEA ON EXERTION: 0
LIGHT-HEADEDNESS: 0
BRUISES/BLEEDS EASILY: 0
DIZZINESS: 0
IRREGULAR HEARTBEAT: 0
SYNCOPE: 0
PND: 0
NEAR-SYNCOPE: 0

## 2024-04-09 NOTE — LETTER
April 9, 2024     Suresh Holland MD  [address] [facility] [address]  [place] [address]    Patient: Samuel Koehler  YOB: 1975  Date of Visit: 4/9/2024      Dear Dr. Holland:    Thank you for referring Samuel Koehler to me for evaluation. Below are my notes for this consultation.    If you have questions, please do not hesitate to call me. I look forward to following your patient along with you.         Sincerely,        Luis Antonio Millan MD        CC: No Recipients    Luis Antonio Millan MD  4/9/2024 12:53 PM  Sign when Signing Visit     Cardiology Note       Reason for visit: MVR      Alfonzo returns today for follow-up.  He tells me that he had gone to the emergency room couple months ago with some chest pain that they follow-up was musculoskeletal.  It has not returned.  He does tell me that every once in a while he can he feel a pop in his sternum.  He does not have any rocking motion on the sternum with palpation.  As you recall, he had a cardiac catheterization prior to his mitral valve replacement last year which did not show any obstructive coronary artery disease.    Clinically, Alfonzo has been doing well without any shortness of breath or chest discomfort.  There has not been any neurological or TIA-like symptoms.  No palpitations.    He tells me that he has been wearing his CPAP for sleep apnea but his wife has told him that he is snoring despite that and has not been to see his sleep physician in some time and I feel that he may need to have his mask adjusted.  He has also put on 5 pounds since his last visit which may be contributing to it.    Alfonzo has not had any bleeding issues related to his anticoagulation and has a home monitor which she calls in to our Coumadin clinic.            Past Medical History:   Diagnosis Date   • ADHD 10/04/2019   • COVID     2023   • Lipid disorder    • Mitral valvular regurgitation 04/25/2023   • PFO (patent foramen ovale) 04/25/2023   • Pulmonary  hypertension (CMS/HCC) 2023     Past Surgical History:   Procedure Laterality Date   • CATARACT EXTRACTION Bilateral        • KNEE ARTHROSCOPY W/ MENISCECTOMY Right    • LIVER SURGERY     • MITRAL VALVE REPLACEMENT Left 2023    left atrial appendage clip placement      Social History     Tobacco Use   • Smoking status: Former     Packs/day: 1.00     Years: 15.00     Additional pack years: 0.00     Total pack years: 15.00     Types: Cigarettes     Quit date: 2019     Years since quittin.5   • Smokeless tobacco: Never   Substance Use Topics   • Alcohol use: Not Currently   • Drug use: No      Family History   Problem Relation Age of Onset   • Heart disease Biological Father    • Diabetes Biological Father    • Heart disease Paternal Grandmother      Penicillins  Current Outpatient Medications   Medication Instructions   • acetaminophen (TYLENOL) 975 mg   • atorvastatin (LIPITOR) 40 mg, oral, Daily   • clindamycin (CLEOCIN HCL) 150 mg, oral, See admin instructions, Take 4 tablets ( 600mg ) one hour prior to dentist   • metoprolol succinate XL (TOPROL-XL) 25 mg, oral, Daily   • warfarin (COUMADIN) 10 mg tablet Take 10 mg three times weekly, take 15 mg 4 times weekly OR as directed by office based on INR result.          Review of Systems   Cardiovascular:  Positive for chest pain. Negative for dyspnea on exertion, irregular heartbeat, leg swelling, near-syncope, orthopnea, palpitations, paroxysmal nocturnal dyspnea and syncope.   Hematologic/Lymphatic: Does not bruise/bleed easily.   Neurological:  Negative for dizziness and light-headedness.      Objective    Vitals:    24 0932   BP: 118/80   Pulse: 72   Resp: 20      Wt Readings from Last 3 Encounters:   24 112 kg (248 lb)   24 112 kg (248 lb)   10/05/23 110 kg (243 lb)      Physical Exam  Neck:      Vascular: No carotid bruit or JVD.   Cardiovascular:      Rate and Rhythm: Normal rate and regular rhythm.       Pulses:           Carotid pulses are 2+ on the right side and 2+ on the left side.       Dorsalis pedis pulses are 2+ on the right side and 2+ on the left side.        Posterior tibial pulses are 2+ on the right side and 2+ on the left side.      Heart sounds: S1 normal and S2 normal. No murmur heard.     No friction rub. No gallop.      Comments: Crisp mechanical valve sounds.  No mitral insufficiency audible.  Pulmonary:      Effort: Pulmonary effort is normal.      Breath sounds: Normal breath sounds.   Musculoskeletal:      Right lower leg: No edema.      Left lower leg: No edema.   Skin:     General: Skin is warm and dry.   Neurological:      Mental Status: He is alert.   Psychiatric:         Behavior: Behavior normal.                   Transthoracic echocardiogram./9/2024.  •  Left Ventricle: Normal ventricle size. Normal wall thickness. Preserved systolic function. Estimated EF 60-65%. Abnormal septal motion consistent with post-operative status. Unable to assess diastolic filling pattern.  •  Right Ventricle: Mildly dilated ventricle size. Normal systolic function.  •  Left Atrium: Mildly dilated atrium.  •  Right Atrium: Mildly dilated atrium.  •  Mitral Valve: Trace transvalvular regurgitation. No stenosis. Mean gradient = 6.00. A mechanical prosthetic valve is present. The prosthetic valve appears normal.  •  Aortic Valve: Tricuspid valve. No regurgitation. No stenosis. Calculated dimensionless index = 0.61.  •  Tricuspid Valve: Normal structure. Mild regurgitation. Estimated RVSP = 28 mmHg.  •  Aorta: Aortic root normal. Sinuses of Valsalva normal-sized. Ascending aorta normal-sized.  •  IVC/SVC: Inferior vena cava is a small caliber vessel (<1.7cm). Inferior vena cava demonstrates normal respiratory collapse.  •  SBE prophylaxis according to the ACC/AHA guidelines is recommended.  •  Compared to the transesophageal echocardiogram dated 3/16/2023: Flail posterior leaflet is no longer present and as  Detail Level: Zone patient now has a mechanical valve in the mitral position.        Cardiac catheterization.  3/17/2023.  Widely patent epicardial coronary arteries.     Transesophageal echocardiogram.  3/17/2023.  Marked prolapse of the P2 scallop.  Flail posterior leaflet with severe mitral regurgitation.  Aortic valve is normal in appearance.  Tricuspid valve is normal.  RV systolic pressure 50 mmHg.  Left atrium normal in size.  No thrombus.  Left ventricle is normal in size and thickness.  Normal left ventricular systolic function.  No regional wall motion abnormalitie      ECG .  Normal sinus rhythm.  Normal EKG     Assessment/Plan    Warfarin anticoagulation  Continue with warfarin.  INR goal 2.5-3.5.    History of mitral valve replacement with mechanical valve  I had Alfonzo get a follow-up echocardiogram today in the office which showed a normally functioning mechanical mitral valve.  There was trace transvalvular mitral insufficiency.  The mean gradient across the valve was 6 mmHg.    He had normal left ventricular systolic function with an ejection fraction of approximately 60%.  I reviewed the results of the echocardiogram with Alfonzo.    Alfonzo will continue on the warfarin.  I once again reminded him about the need for SBE prophylaxis.  He has clindamycin at home to take.  He is allergic to penicillin.    Mixed hyperlipidemia  Continue atorvastatin 40 mg daily.  Counseled Alfonzo on the need for diet, exercise and weight loss.    He has an appointment to see a nutritionist.    I also emphasized the Alfonzo the need to follow-up with his sleep doctor for adjustment of his CPAP machine.    I discussed with him the risks of sleep apnea on his heart especially the propensity to develop atrial fibrillation.  Given his mitral valve disease he is also more prone to this as well.    Alfonzo will be coming due for his day-to-day care.  Of asked her return to see me in 6 months time or sooner if there is a problem.  I will continue to keep you  Detail Level: Generalized informed of his progress.  He will call with any questions or concerns in the interim.            Thank you for allowing me to participate in the care of this patient.  If you have any questions please don't hesitate to contact me.    I spent 30 minutes on this date of service performing the following activities: obtaining history, performing examination, entering orders, documenting, preparing for visit, obtaining / reviewing records, providing counseling and education and communicating results.     Luis Antonio Millan MD [facility]   4/9/2024  12:53 PM              Detail Level: Detailed

## 2024-04-09 NOTE — ASSESSMENT & PLAN NOTE
Continue atorvastatin 40 mg daily.  Counseled Alfonzo on the need for diet, exercise and weight loss.    He has an appointment to see a nutritionist.    I also emphasized the Bill the need to follow-up with his sleep doctor for adjustment of his CPAP machine.    I discussed with him the risks of sleep apnea on his heart especially the propensity to develop atrial fibrillation.  Given his mitral valve disease he is also more prone to this as well.

## 2024-04-09 NOTE — ASSESSMENT & PLAN NOTE
I had Alfonzo get a follow-up echocardiogram today in the office which showed a normally functioning mechanical mitral valve.  There was trace transvalvular mitral insufficiency.  The mean gradient across the valve was 6 mmHg.    He had normal left ventricular systolic function with an ejection fraction of approximately 60%.  I reviewed the results of the echocardiogram with Alfonzo.    Alfonzo will continue on the warfarin.  I once again reminded him about the need for SBE prophylaxis.  He has clindamycin at home to take.  He is allergic to penicillin.

## 2024-04-09 NOTE — PATIENT INSTRUCTIONS
Patient Education   Mediterranean Diet  A Mediterranean diet refers to food and lifestyle choices that are based on the traditions of countries located on the [place]. It focuses on eating more fruits, vegetables, whole grains, beans, nuts, seeds, and heart-healthy fats, and eating less dairy, meat, eggs, and processed foods with added sugar, salt, and fat. This way of eating has been shown to help prevent certain conditions and improve outcomes for people who have chronic diseases, like kidney disease and heart disease.  What are tips for following this plan?  Reading food labels  Check the serving size of packaged foods. For foods such as rice and pasta, the serving size refers to the amount of cooked product, not dry.  Check the total fat in packaged foods. Avoid foods that have saturated fat or trans fats.  Check the ingredient list for added sugars, such as corn syrup.  Shopping    Buy a variety of foods that offer a balanced diet, including:  Fresh fruits and vegetables (produce).  Grains, beans, nuts, and seeds. Some of these may be available in unpackaged forms or large amounts (in bulk).  Fresh seafood.  Poultry and eggs.  Low-fat dairy products.  Buy whole ingredients instead of prepackaged foods.  Buy fresh fruits and vegetables in-season from local [employer] markets.  Buy plain frozen fruits and vegetables.  If you do not have access to quality fresh seafood, buy precooked frozen shrimp or canned fish, such as tuna, salmon, or sardines.  Stock your pantry so you always have certain foods on hand, such as olive oil, canned tuna, canned tomatoes, rice, pasta, and beans.  Cooking  Cook foods with extra-virgin olive oil instead of using butter or other vegetable oils.  Have meat as a side dish, and have vegetables or grains as your main dish. This means having meat in small portions or adding small amounts of meat to foods like pasta or stew.  Use beans or vegetables instead of meat in common dishes  like chili or lasagna.  [place] with different cooking methods. Try roasting, broiling, steaming, and sautéing vegetables.  Add frozen vegetables to soups, stews, pasta, or rice.  Add nuts or seeds for added healthy fats and plant protein at each meal. You can add these to yogurt, salads, or vegetable dishes.  Marinate fish or vegetables using olive oil, lemon juice, garlic, and fresh herbs.  Meal planning  Plan to eat one vegetarian meal one day each week. Try to work up to two vegetarian meals, if possible.  Eat seafood two or more times a week.  Have healthy snacks readily available, such as:  Vegetable sticks with hummus.  Greek yogurt.  Fruit and nut trail mix.  Eat balanced meals throughout the week. This includes:  Fruit: 2-3 servings a day.  Vegetables: 4-5 servings a day.  Low-fat dairy: 2 servings a day.  Fish, poultry, or lean meat: 1 serving a day.  Beans and legumes: 2 or more servings a week.  Nuts and seeds: 1-2 servings a day.  Whole grains: 6-8 servings a day.  Extra-virgin olive oil: 3-4 servings a day.  Limit red meat and sweets to only a few servings a month.  Lifestyle    Cook and eat meals together with your family, when possible.  Drink enough fluid to keep your urine pale yellow.  Be physically active every day. This includes:  Aerobic exercise like running or swimming.  Leisure activities like gardening, walking, or housework.  Get 7-8 hours of sleep each night.  If recommended by your health care provider, drink red wine in moderation. This means 1 glass a day for nonpregnant women and 2 glasses a day for men. A glass of wine equals 5 oz (150 mL).  What foods should I eat?  Fruits  Apples. Apricots. Avocado. Berries. Bananas. Cherries. Dates. Figs. Grapes. Kina. Melon. Oranges. Peaches. Plums. Pomegranate.  Vegetables  Artichokes. Beets. Broccoli. Cabbage. Carrots. Eggplant. Green beans. Chard. Kale. Spinach. Onions. Leeks. Peas. Squash. Tomatoes. Peppers.  Radishes.  Grains  Whole-grain pasta. Brown rice. Bulgur wheat. Polenta. Couscous. Whole-wheat bread. Oatmeal. Quinoa.  Meats and other proteins  Beans. Almonds. Sunflower seeds. Pine nuts. Peanuts. Cod. [place]. Scallops. Shrimp. Tuna. Tilapia. Clams. Oysters. Eggs. Poultry without skin.  Dairy  Low-fat milk. Cheese. Greek yogurt.  Fats and oils  Extra-virgin olive oil. Avocado oil. Grapeseed oil.  Beverages  Water. Red wine. Herbal tea.  Sweets and desserts  Greek yogurt with honey. Baked apples. Poached pears. Trail mix.  Seasonings and condiments  Basil. Cilantro. Coriander. Cumin. Mint. Parsley. Jostin. Rosemary. Tarragon. Garlic. Oregano. Thyme. Pepper. Balsamic vinegar. Tahini. Hummus. Tomato sauce. Olives. Mushrooms.  The items listed above may not be a complete list of foods and beverages you can eat. Contact a dietitian for more information.  What foods should I limit?  This is a list of foods that should be eaten rarely or only on special occasions.  Fruits  Fruit canned in syrup.  Vegetables  Deep-fried potatoes (french fries).  Grains  Prepackaged pasta or rice dishes. Prepackaged cereal with added sugar. Prepackaged snacks with added sugar.  Meats and other proteins  Beef. Pork. Lamb. Poultry with skin. Hot dogs. [place].  Dairy  Ice cream. Sour cream. Whole milk.  Fats and oils  Butter. Canola oil. Vegetable oil. Beef fat (tallow). Lard.  Beverages  Juice. Sugar-sweetened soft drinks. Beer. Liquor and spirits.  Sweets and desserts  Cookies. Cakes. Pies. Candy.  Seasonings and condiments  Mayonnaise. Pre-made sauces and marinades.  The items listed above may not be a complete list of foods and beverages you should limit. Contact a dietitian for more information.  Summary  The Mediterranean diet includes both food and lifestyle choices.  Eat a variety of fresh fruits and vegetables, beans, nuts, seeds, and whole grains.  Limit the amount of red meat and sweets that you eat.  If recommended by your health  care provider, drink red wine in moderation. This means 1 glass a day for nonpregnant women and 2 glasses a day for men. A glass of wine equals 5 oz (150 mL).  This information is not intended to replace advice given to you by your health care provider. Make sure you discuss any questions you have with your health care provider.  Document Revised: 01/22/2021 Document Reviewed: 11/19/2020  Elsevier Patient Education © 2023 [employer]vier Inc.

## 2024-04-18 LAB — INR PPP: 2.3

## 2024-04-19 ENCOUNTER — ANTICOAGULATION VISIT (OUTPATIENT)
Dept: CARDIOLOGY | Facility: CLINIC | Age: 49
End: 2024-04-19
Payer: COMMERCIAL

## 2024-04-19 DIAGNOSIS — Z95.2 HISTORY OF MITRAL VALVE REPLACEMENT WITH MECHANICAL VALVE: Primary | ICD-10-CM

## 2024-04-19 NOTE — PROGRESS NOTES
Patient checked his INR yesterday with his [employer] home monitor, which his INR was 2.3, and patient's goal is 2.5-3.5    Patient denies any bruising/bleeding, or changes in medication or diet.  Told patient to remain on the same regimen of 10 mg every Sunday, Tuesday, and Thursday, and 15 mg all the rest of the days.  Told patient to check his INR in two weeks on 05/02/2024, which patient verbally understands all above information and has no further questions at this time.

## 2024-04-25 LAB — INR PPP: 2.7

## 2024-04-26 ENCOUNTER — ANTICOAGULATION VISIT (OUTPATIENT)
Dept: CARDIOLOGY | Facility: CLINIC | Age: 49
End: 2024-04-26
Payer: COMMERCIAL

## 2024-04-26 DIAGNOSIS — Z95.2 HISTORY OF MITRAL VALVE REPLACEMENT WITH MECHANICAL VALVE: Primary | ICD-10-CM

## 2024-04-26 NOTE — PROGRESS NOTES
Received a Lab message on EPIC that patient had checked his INR yesterday with his [employer] machine and his INR was 2.7, which patient's INR range is 2.5-3.5.    Left a detailed voice message for patient to call us back to let us know if she has any bruising or bleeding, changes in medication or diet.  Told patient that I would like for him to stay on his Coumadin regimen of 10 mg every Sunday, Tuesday, and Thursday, and 15 mg all the other days.  Told patient to check his INR in two weeks on Thursday 04/09/2024.  To patient to call back if he has any questions or concerns.

## 2024-05-08 LAB — INR PPP: 2.4

## 2024-05-09 ENCOUNTER — TELEPHONE (OUTPATIENT)
Dept: CARDIOLOGY | Facility: CLINIC | Age: 49
End: 2024-05-09

## 2024-05-09 ENCOUNTER — ANTICOAGULATION VISIT (OUTPATIENT)
Dept: CARDIOLOGY | Facility: CLINIC | Age: 49
End: 2024-05-09
Payer: COMMERCIAL

## 2024-05-09 DIAGNOSIS — Z95.2 HISTORY OF MITRAL VALVE REPLACEMENT WITH MECHANICAL VALVE: Primary | ICD-10-CM

## 2024-05-09 NOTE — PROGRESS NOTES
Called and spoke with patient and made him aware that we received his INR results from yesterday and that his INR was 2.4 and his goal is 2.5 to 3.5    Patient denies any bruising/bleeding or changes in medication or diet.  Told patient to continue with regimen of 10 mg of Coumadin on Sunday, Tuesday, and Thursday, and 15 mg all the other days.  Also told patient to check his INR in two weeks on 05/22/2024.  Told patient that if those results are with in normal then we can push his testing out to three weeks.  Patient verbally understands and has no further questions at this time.

## 2024-05-23 ENCOUNTER — ANTICOAGULATION VISIT (OUTPATIENT)
Dept: CARDIOLOGY | Facility: CLINIC | Age: 49
End: 2024-05-23
Payer: COMMERCIAL

## 2024-05-23 ENCOUNTER — TELEPHONE (OUTPATIENT)
Dept: SCHEDULING | Facility: CLINIC | Age: 49
End: 2024-05-23
Payer: COMMERCIAL

## 2024-05-23 DIAGNOSIS — Z95.2 HISTORY OF MITRAL VALVE REPLACEMENT WITH MECHANICAL VALVE: Primary | ICD-10-CM

## 2024-05-23 LAB — INR PPP: 2.3

## 2024-05-23 NOTE — PROGRESS NOTES
INR 2.3. I called and left a detailed VM for patient asking him to increase to 15 mg tonight. Moving forward I asked him to take 10 mg Sunday and Tuesday only. 15 mg AOD. I asked patient to repeat INR in two weeks.

## 2024-06-19 LAB — INR PPP: 3.3

## 2024-06-20 ENCOUNTER — ANTICOAGULATION VISIT (OUTPATIENT)
Dept: CARDIOLOGY | Facility: CLINIC | Age: 49
End: 2024-06-20
Payer: COMMERCIAL

## 2024-06-20 DIAGNOSIS — Z95.2 HISTORY OF MITRAL VALVE REPLACEMENT WITH MECHANICAL VALVE: Primary | ICD-10-CM

## 2024-06-20 NOTE — PROGRESS NOTES
INR 3.3. pt will continue on current dose of coumadin and repeat INR 3-4 weeks. He will call in the interim with q/q.

## 2024-07-18 ENCOUNTER — DOCUMENTATION (OUTPATIENT)
Dept: CARDIOLOGY | Facility: CLINIC | Age: 49
End: 2024-07-18
Payer: COMMERCIAL

## 2024-07-20 LAB — INR PPP: 2.5

## 2024-07-22 ENCOUNTER — ANTICOAGULATION VISIT (OUTPATIENT)
Dept: CARDIOLOGY | Facility: CLINIC | Age: 49
End: 2024-07-22
Payer: COMMERCIAL

## 2024-07-22 DIAGNOSIS — Z95.2 HISTORY OF MITRAL VALVE REPLACEMENT WITH MECHANICAL VALVE: Primary | ICD-10-CM

## 2024-07-22 DIAGNOSIS — I27.20 PULMONARY HYPERTENSION (CMS/HCC): ICD-10-CM

## 2024-07-22 RX ORDER — METOPROLOL SUCCINATE 25 MG/1
25 TABLET, EXTENDED RELEASE ORAL DAILY
Qty: 90 TABLET | Refills: 3 | Status: SHIPPED | OUTPATIENT
Start: 2024-07-22

## 2024-07-27 ENCOUNTER — APPOINTMENT (EMERGENCY)
Dept: RADIOLOGY | Facility: HOSPITAL | Age: 49
End: 2024-07-27
Payer: COMMERCIAL

## 2024-07-27 ENCOUNTER — HOSPITAL ENCOUNTER (EMERGENCY)
Facility: HOSPITAL | Age: 49
Discharge: HOME | End: 2024-07-27
Attending: EMERGENCY MEDICINE
Payer: COMMERCIAL

## 2024-07-27 ENCOUNTER — APPOINTMENT (EMERGENCY)
Dept: CT IMAGING | Facility: HOSPITAL | Age: 49
End: 2024-07-27
Payer: COMMERCIAL

## 2024-07-27 VITALS
SYSTOLIC BLOOD PRESSURE: 137 MMHG | TEMPERATURE: 99.1 F | DIASTOLIC BLOOD PRESSURE: 79 MMHG | RESPIRATION RATE: 17 BRPM | HEART RATE: 64 BPM | OXYGEN SATURATION: 98 %

## 2024-07-27 DIAGNOSIS — R07.89 ATYPICAL CHEST PAIN: Primary | ICD-10-CM

## 2024-07-27 DIAGNOSIS — F41.9 ANXIETY: ICD-10-CM

## 2024-07-27 LAB
2HR DELTA HS TROPONIN: >5 NG/L
4HR DELTA HS TROPONIN: >1 NG/L
ALBUMIN SERPL BCG-MCNC: 4.5 G/DL (ref 3.5–5)
ALP SERPL-CCNC: 76 U/L (ref 34–104)
ALT SERPL W P-5'-P-CCNC: 29 U/L (ref 7–52)
ANION GAP SERPL CALCULATED.3IONS-SCNC: 8 MMOL/L (ref 4–13)
APTT PPP: 46 SECONDS (ref 23–37)
AST SERPL W P-5'-P-CCNC: 28 U/L (ref 13–39)
BASOPHILS # BLD AUTO: 0.02 THOUSANDS/ÂΜL (ref 0–0.1)
BASOPHILS NFR BLD AUTO: 0 % (ref 0–1)
BILIRUB SERPL-MCNC: 0.75 MG/DL (ref 0.2–1)
BUN SERPL-MCNC: 15 MG/DL (ref 5–25)
CALCIUM SERPL-MCNC: 8.8 MG/DL (ref 8.4–10.2)
CARDIAC TROPONIN I PNL SERPL HS: 3 NG/L
CARDIAC TROPONIN I PNL SERPL HS: 7 NG/L
CARDIAC TROPONIN I PNL SERPL HS: <2 NG/L
CHLORIDE SERPL-SCNC: 106 MMOL/L (ref 96–108)
CO2 SERPL-SCNC: 23 MMOL/L (ref 21–32)
CREAT SERPL-MCNC: 0.83 MG/DL (ref 0.6–1.3)
EOSINOPHIL # BLD AUTO: 0.14 THOUSAND/ÂΜL (ref 0–0.61)
EOSINOPHIL NFR BLD AUTO: 3 % (ref 0–6)
ERYTHROCYTE [DISTWIDTH] IN BLOOD BY AUTOMATED COUNT: 12.8 % (ref 11.6–15.1)
GFR SERPL CREATININE-BSD FRML MDRD: 101 ML/MIN/1.73SQ M
GLUCOSE SERPL-MCNC: 100 MG/DL (ref 65–140)
HCT VFR BLD AUTO: 40.6 % (ref 36.5–49.3)
HGB BLD-MCNC: 13.1 G/DL (ref 12–17)
IMM GRANULOCYTES # BLD AUTO: 0.04 THOUSAND/UL (ref 0–0.2)
IMM GRANULOCYTES NFR BLD AUTO: 1 % (ref 0–2)
INR PPP: 3.53 (ref 0.84–1.19)
LYMPHOCYTES # BLD AUTO: 0.93 THOUSANDS/ÂΜL (ref 0.6–4.47)
LYMPHOCYTES NFR BLD AUTO: 20 % (ref 14–44)
MCH RBC QN AUTO: 29.6 PG (ref 26.8–34.3)
MCHC RBC AUTO-ENTMCNC: 32.3 G/DL (ref 31.4–37.4)
MCV RBC AUTO: 92 FL (ref 82–98)
MONOCYTES # BLD AUTO: 0.44 THOUSAND/ÂΜL (ref 0.17–1.22)
MONOCYTES NFR BLD AUTO: 10 % (ref 4–12)
NEUTROPHILS # BLD AUTO: 3.05 THOUSANDS/ÂΜL (ref 1.85–7.62)
NEUTS SEG NFR BLD AUTO: 66 % (ref 43–75)
NRBC BLD AUTO-RTO: 0 /100 WBCS
PLATELET # BLD AUTO: 172 THOUSANDS/UL (ref 149–390)
PMV BLD AUTO: 10.3 FL (ref 8.9–12.7)
POTASSIUM SERPL-SCNC: 4 MMOL/L (ref 3.5–5.3)
PROT SERPL-MCNC: 7.1 G/DL (ref 6.4–8.4)
PROTHROMBIN TIME: 35.9 SECONDS (ref 11.6–14.5)
RBC # BLD AUTO: 4.43 MILLION/UL (ref 3.88–5.62)
SODIUM SERPL-SCNC: 137 MMOL/L (ref 135–147)
WBC # BLD AUTO: 4.62 THOUSAND/UL (ref 4.31–10.16)

## 2024-07-27 PROCEDURE — 84484 ASSAY OF TROPONIN QUANT: CPT | Performed by: EMERGENCY MEDICINE

## 2024-07-27 PROCEDURE — 71275 CT ANGIOGRAPHY CHEST: CPT

## 2024-07-27 PROCEDURE — 36415 COLL VENOUS BLD VENIPUNCTURE: CPT | Performed by: EMERGENCY MEDICINE

## 2024-07-27 PROCEDURE — 74174 CTA ABD&PLVS W/CONTRAST: CPT

## 2024-07-27 PROCEDURE — 85730 THROMBOPLASTIN TIME PARTIAL: CPT | Performed by: EMERGENCY MEDICINE

## 2024-07-27 PROCEDURE — 71045 X-RAY EXAM CHEST 1 VIEW: CPT

## 2024-07-27 PROCEDURE — 93005 ELECTROCARDIOGRAM TRACING: CPT

## 2024-07-27 PROCEDURE — 99285 EMERGENCY DEPT VISIT HI MDM: CPT | Performed by: EMERGENCY MEDICINE

## 2024-07-27 PROCEDURE — 80053 COMPREHEN METABOLIC PANEL: CPT | Performed by: EMERGENCY MEDICINE

## 2024-07-27 PROCEDURE — NC001 PR NO CHARGE: Performed by: PHYSICIAN ASSISTANT

## 2024-07-27 PROCEDURE — 85025 COMPLETE CBC W/AUTO DIFF WBC: CPT | Performed by: EMERGENCY MEDICINE

## 2024-07-27 PROCEDURE — 85610 PROTHROMBIN TIME: CPT | Performed by: EMERGENCY MEDICINE

## 2024-07-27 PROCEDURE — 99285 EMERGENCY DEPT VISIT HI MDM: CPT

## 2024-07-27 RX ORDER — ASPIRIN 81 MG/1
324 TABLET, CHEWABLE ORAL ONCE
Status: COMPLETED | OUTPATIENT
Start: 2024-07-27 | End: 2024-07-27

## 2024-07-27 RX ADMIN — IOHEXOL 100 ML: 350 INJECTION, SOLUTION INTRAVENOUS at 15:36

## 2024-07-27 RX ADMIN — ASPIRIN 81 MG CHEWABLE TABLET 324 MG: 81 TABLET CHEWABLE at 14:24

## 2024-07-27 NOTE — ED CARE HANDOFF
Emergency Department Sign Out Note        Sign out and transfer of care from [facility]. See Separate Emergency Department note.     The patient, Tristin Toro, was evaluated by the previous provider for 10 days of intermittent chest pain, back pain, tachycardia.    Workup Completed:  EKG, CMP, troponin enzymes, CBC with differential, PT/INR, PTT, chest x-ray    ED Course / Workup Pending (followup):  Patient is a 49-year-old male with a history of mitral valve replacement about a year ago.  For the past 10 days he has been having intermittent chest discomfort.  He states that it comes on randomly and is not always connected with exertion.  Today he happened to be walking around the store when it came mom, but sometimes will happen when he is just sitting and resting.  He states that the episodes last a variable amount of time and nothing in specific makes them go away.  Today the episode radiated through to his back so he decided to come here and get checked out.  He has had some intermittent burping and belching and was given a prescription for famotidine which she has been taking for the past couple of days.  He does feel as though it helps when he takes it.  Due to the concerning history, CTA was performed to rule out aortic dissection or other vascular anomaly.  CTA was negative.  His EKG was nonischemic and serial troponins were all within normal limits.  His CHEM panel and CBC with differential were also within normal limits.  His INR was elevated at 3.5.  He does do regular INR checks and will recheck it with his primary doctor.  Chest x-ray was unremarkable.  Patient was offered admission due to his heart score of 4 and relatively concerning history of potential tachycardia initiating his chest pain.  However, the patient and his wife wanted to get discharged home to follow-up with his own cardiologist back home.  He was given very strict return precautions and follow-up instructions.  He will call  his cardiologist on Monday to schedule follow-up appointment and will go directly to the hospital if he develops any new or worsening chest discomfort, lightheadedness, feels like he is going to pass out, or has any other concerning symptoms.  Patient and his wife feel comfortable with the plan and will follow-up as needed. He remained hemodynamically stable throughout his stay here in the ER and was discharged      HEART Risk Score      Flowsheet Row Most Recent Value   Heart Score Risk Calculator    History 1 Filed at: 07/27/2024 1643   ECG 0 Filed at: 07/27/2024 1643   Age 1 Filed at: 07/27/2024 1643   Risk Factors 2 Filed at: 07/27/2024 1643   Troponin 0 Filed at: 07/27/2024 1643   HEART Score 4 Filed at: 07/27/2024 1643                                    ED Course as of 07/27/24 2004   Sat Jul 27, 2024   1629 hs TnI 2hr: 7     Procedures  Medical Decision Making  Amount and/or Complexity of Data Reviewed  Labs: ordered. Decision-making details documented in ED Course.  Radiology: ordered and independent interpretation performed.    Risk  OTC drugs.  Prescription drug management.            Disposition  Final diagnoses:   Atypical chest pain   Anxiety     Time reflects when diagnosis was documented in both MDM as applicable and the Disposition within this note       Time User Action Codes Description Comment    7/27/2024  5:06 PM Laura Martinez Add [R07.89] Atypical chest pain     7/27/2024  5:06 PM Laura Martinez Add [F41.9] Anxiety           ED Disposition       ED Disposition   Discharge    Condition   Stable    Date/Time   Sat Jul 27, 2024 1706    Comment   Tristin Toro discharge to home/self care.                   Follow-up Information       Follow up With Specialties Details Why Contact Info    Your Cardiologist              There are no discharge medications for this patient.    No discharge procedures on file.       ED Provider  Electronically Signed by     Laura Martinez MD  07/27/24 2005

## 2024-07-27 NOTE — ED PROVIDER NOTES
"History  Chief Complaint   Patient presents with    Chest Pain     Pt states \"I have chest pain pressure that started 1 week to week and half ago. Today I started having the pain go to my back\" Denies any sob. Hx valve replacement.      Patient is a 49-year-old male who presents with chest pain.  Patient states that for the last 1.5 weeks he has had chest pain.  He states that he went to an outside hospital emergency department for chest pain and had lab work and a chest x-ray done that were normal.  States that things were improving, but today while walking at a grocery store he developed worsening chest pain and it radiated through to his back, more intense than ever before, constant since it started, improving.  He states that he looked at his watch and his heart rate was in the 130s. Reports that presently, pain improved, but not 100% resolved.             None       Past Medical History:   Diagnosis Date    History of heart valve replacement        History reviewed. No pertinent surgical history.    History reviewed. No pertinent family history.  I have reviewed and agree with the history as documented.    E-Cigarette/Vaping     E-Cigarette/Vaping Substances     Social History     Tobacco Use    Smoking status: Never    Smokeless tobacco: Never   Substance Use Topics    Alcohol use: Never    Drug use: Never       Review of Systems   Constitutional:  Negative for chills and fever.   Respiratory:  Negative for shortness of breath.    Cardiovascular:  Positive for chest pain. Negative for palpitations and leg swelling.   Gastrointestinal:  Negative for abdominal pain, diarrhea, nausea and vomiting.   Genitourinary:  Negative for flank pain.   Musculoskeletal:  Positive for back pain. Negative for neck pain.   Neurological:  Negative for dizziness, weakness, light-headedness and numbness.       Physical Exam  Physical Exam  Vitals and nursing note reviewed.   Constitutional:       General: He is not in acute " distress.     Appearance: Normal appearance. He is obese. He is not ill-appearing, toxic-appearing or diaphoretic.   HENT:      Head: Normocephalic and atraumatic.      Mouth/Throat:      Mouth: Mucous membranes are moist.   Eyes:      Conjunctiva/sclera: Conjunctivae normal.      Pupils: Pupils are equal, round, and reactive to light.   Cardiovascular:      Rate and Rhythm: Normal rate and regular rhythm.      Pulses: Normal pulses.      Heart sounds: Normal heart sounds. No murmur heard.  Pulmonary:      Effort: Pulmonary effort is normal. No respiratory distress.      Breath sounds: Normal breath sounds. No stridor. No wheezing, rhonchi or rales.   Chest:      Chest wall: No tenderness.   Abdominal:      General: Bowel sounds are normal. There is no distension.      Palpations: Abdomen is soft.      Tenderness: There is no abdominal tenderness. There is no guarding or rebound.   Musculoskeletal:      Cervical back: Neck supple.      Right lower leg: No edema.      Left lower leg: No edema.   Skin:     General: Skin is warm and dry.   Neurological:      General: No focal deficit present.      Mental Status: He is alert and oriented to person, place, and time. Mental status is at baseline.   Psychiatric:         Mood and Affect: Mood normal.         Behavior: Behavior normal.         Vital Signs  ED Triage Vitals [07/27/24 1406]   Temperature Pulse Respirations Blood Pressure SpO2   99.1 °F (37.3 °C) 79 20 (!) 147/108 97 %      Temp Source Heart Rate Source Patient Position - Orthostatic VS BP Location FiO2 (%)   Temporal Monitor Sitting Right arm --      Pain Score       4           Vitals:    07/27/24 1406 07/27/24 1530 07/27/24 1630 07/27/24 1700   BP: (!) 147/108 122/73 129/80 137/79   Pulse: 79 69 65 64   Patient Position - Orthostatic VS: Sitting Lying Lying Lying         Visual Acuity      ED Medications  Medications   aspirin chewable tablet 324 mg (324 mg Oral Given 7/27/24 1424)   iohexol (OMNIPAQUE) 350  MG/ML injection (MULTI-DOSE) 100 mL (100 mL Intravenous Given 7/27/24 1536)       Diagnostic Studies  Results Reviewed       Procedure Component Value Units Date/Time    HS Troponin I 4hr [[id number]]  (Normal) Collected: 07/27/24 1710    Lab Status: Final result Specimen: Blood from Arm, Right Updated: 07/27/24 1737     hs TnI 4hr 3 ng/L      Delta 4hr hsTnI >1 ng/L     HS Troponin I 2hr [[id number]]  (Normal) Collected: 07/27/24 1556    Lab Status: Final result Specimen: Blood Updated: 07/27/24 1621     hs TnI 2hr 7 ng/L      Delta 2hr hsTnI >5 ng/L     Protime-INR [[id number]]  (Abnormal) Collected: 07/27/24 1435    Lab Status: Final result Specimen: Blood from Arm, Right Updated: 07/27/24 1511     Protime 35.9 seconds      INR 3.53    APTT [[id number]]  (Abnormal) Collected: 07/27/24 1435    Lab Status: Final result Specimen: Blood from Arm, Right Updated: 07/27/24 1511     PTT 46 seconds     HS Troponin 0hr (reflex protocol) [[id number]]  (Normal) Collected: 07/27/24 1435    Lab Status: Final result Specimen: Blood from Arm, Right Updated: 07/27/24 1508     hs TnI 0hr <2 ng/L     Comprehensive metabolic panel [[id number]] Collected: 07/27/24 1435    Lab Status: Final result Specimen: Blood from Arm, Right Updated: 07/27/24 1506     Sodium 137 mmol/L      Potassium 4.0 mmol/L      Chloride 106 mmol/L      CO2 23 mmol/L      ANION GAP 8 mmol/L      BUN 15 mg/dL      Creatinine 0.83 mg/dL      Glucose 100 mg/dL      Calcium 8.8 mg/dL      AST 28 U/L      ALT 29 U/L      Alkaline Phosphatase 76 U/L      Total Protein 7.1 g/dL      Albumin 4.5 g/dL      Total Bilirubin 0.75 mg/dL      eGFR 101 ml/min/1.73sq m     Narrative:      National Kidney Disease Foundation guidelines for Chronic Kidney Disease (CKD):     Stage 1 with normal or high GFR (GFR > 90 mL/min/1.73 square meters)    Stage 2 Mild CKD (GFR = 60-89 mL/min/1.73 square meters)    Stage 3A Moderate CKD (GFR = 45-59 mL/min/1.73 square meters)    Stage 3B Moderate  CKD (GFR = 30-44 mL/min/1.73 square meters)    Stage 4 Severe CKD (GFR = 15-29 mL/min/1.73 square meters)    Stage 5 End Stage CKD (GFR <15 mL/min/1.73 square meters)  Note: GFR calculation is accurate only with a steady state creatinine    CBC and differential [[id number]] Collected: 07/27/24 1435    Lab Status: Final result Specimen: Blood from Arm, Right Updated: 07/27/24 1447     WBC 4.62 Thousand/uL      RBC 4.43 Million/uL      Hemoglobin 13.1 g/dL      Hematocrit 40.6 %      MCV 92 fL      MCH 29.6 pg      MCHC 32.3 g/dL      RDW 12.8 %      MPV 10.3 fL      Platelets 172 Thousands/uL      nRBC 0 /100 WBCs      Segmented % 66 %      Immature Grans % 1 %      Lymphocytes % 20 %      Monocytes % 10 %      Eosinophils Relative 3 %      Basophils Relative 0 %      Absolute Neutrophils 3.05 Thousands/µL      Absolute Immature Grans 0.04 Thousand/uL      Absolute Lymphocytes 0.93 Thousands/µL      Absolute Monocytes 0.44 Thousand/µL      Eosinophils Absolute 0.14 Thousand/µL      Basophils Absolute 0.02 Thousands/µL                    CTA dissection protocol chest/abdomen/pelvis   Final Result by Tano Salazar MD (07/27 1630)         1. No intrathoracic or abdominal aortic aneurysm or dissection.   2. No acute process in the chest, abdomen or pelvis.               Workstation performed: [id number]         XR chest 1 view portable   ED Interpretation by Holly Singh DO (07/27 1514)   No acute abnormalities as interpreted by me independently       Final Result by Lissy Mendieta MD (07/27 2040)      No acute cardiopulmonary disease.            Workstation performed: [id number]                    Procedures  Procedures         ED Course  ED Course as of 07/28/24 1101   Sat Jul 27, 2024   1511 hs TnI 0hr: <2   1548 Patient care signed out to Dr. Martinez at end of shift. Patient is a 48 yo M who p/w intermittent episodes of CP, worse today than prior, radiating through to his back, improving and now  resolved. Initial troponin negative. CTA dissection study and 2nd troponin pending. Has a h/o HTN, obesity, prior tobacco use, s/p MV replacement on coumadin.                HEART Risk Score      Flowsheet Row Most Recent Value   Heart Score Risk Calculator    History 1 Filed at: 07/27/2024 1643   ECG 0 Filed at: 07/27/2024 1643   Age 1 Filed at: 07/27/2024 1643   Risk Factors 2 Filed at: 07/27/2024 1643   Troponin 0 Filed at: 07/27/2024 1643   HEART Score 4 Filed at: 07/27/2024 1643                                        Medical Decision Making  Assessment and Plan:   Differential includes ACS versus dissection.  Check labs to evaluate for leukocytosis, anemia, electrolyte abnormalities, kidney and liver function; EKG/troponin to evaluate for arrhythmia/ischemia; CTA dissection study for further evaluation of aforementioned differential.  Give 324 of aspirin and reassess.    Review of medical records from [facility] cardiology note from 4/9/24 shows that the patient has a h/o MVR s/p mitral valve replacement, pulmonary htn, prior tobacco dependence, chronic anticoagulation with coumadin    Amount and/or Complexity of Data Reviewed  Labs: ordered. Decision-making details documented in ED Course.  Radiology: ordered and independent interpretation performed.    Risk  OTC drugs.  Prescription drug management.                 Disposition  Final diagnoses:   Atypical chest pain   Anxiety     Time reflects when diagnosis was documented in both MDM as applicable and the Disposition within this note       Time User Action Codes Description Comment    7/27/2024  5:06 PM Laura Martinez [R07.89] Atypical chest pain     7/27/2024  5:06 PM Laura Martinez [F41.9] Anxiety           ED Disposition       ED Disposition   Discharge    Condition   Stable    Date/Time   Sat Jul 27, 2024 1706    Comment   Tristin Toro discharge to home/self care.                   Follow-up Information       Follow up With Specialties Details Why  Contact Info    Your Cardiologist                There are no discharge medications for this patient.      No discharge procedures on file.    PDMP Review       None            ED Provider  Electronically Signed by             Holly Singh DO  07/28/24 8346

## 2024-07-27 NOTE — DISCHARGE INSTRUCTIONS
Please call your cardiologist on Monday and set up a follow-up appointment.  Please return here to the ER or go to an ER near your house if you develop any worsening chest discomfort, lightheadedness, feel like you are going to pass out, or have any other concerning symptoms.  Just because no acute cause for your chest discomfort was found today, does not mean that something worse could happen when you go home, so please go to the ER if you have any changes in your condition or have any new concerns.  Continue to take the famotidine as prescribed last time you are in the ER.

## 2024-07-28 LAB
ATRIAL RATE: 75 BPM
P AXIS: 82 DEGREES
PR INTERVAL: 194 MS
QRS AXIS: -21 DEGREES
QRSD INTERVAL: 92 MS
QT INTERVAL: 436 MS
QTC INTERVAL: 486 MS
T WAVE AXIS: 75 DEGREES
VENTRICULAR RATE: 75 BPM

## 2024-07-28 PROCEDURE — 93010 ELECTROCARDIOGRAM REPORT: CPT | Performed by: INTERNAL MEDICINE

## 2024-07-30 ENCOUNTER — OFFICE VISIT (OUTPATIENT)
Dept: FAMILY MEDICINE | Facility: CLINIC | Age: 49
End: 2024-07-30
Payer: COMMERCIAL

## 2024-07-30 VITALS
TEMPERATURE: 98 F | DIASTOLIC BLOOD PRESSURE: 74 MMHG | SYSTOLIC BLOOD PRESSURE: 124 MMHG | WEIGHT: 246 LBS | OXYGEN SATURATION: 97 % | HEIGHT: 72 IN | HEART RATE: 86 BPM | BODY MASS INDEX: 33.32 KG/M2

## 2024-07-30 DIAGNOSIS — Q21.12 PFO (PATENT FORAMEN OVALE): ICD-10-CM

## 2024-07-30 DIAGNOSIS — R10.13 EPIGASTRIC ABDOMINAL PAIN: Primary | ICD-10-CM

## 2024-07-30 PROCEDURE — 99213 OFFICE O/P EST LOW 20 MIN: CPT | Performed by: FAMILY MEDICINE

## 2024-07-30 PROCEDURE — 3008F BODY MASS INDEX DOCD: CPT | Performed by: FAMILY MEDICINE

## 2024-07-30 RX ORDER — OMEPRAZOLE 40 MG/1
40 CAPSULE, DELAYED RELEASE ORAL
Qty: 30 CAPSULE | Refills: 1 | Status: SHIPPED | OUTPATIENT
Start: 2024-07-30 | End: 2024-10-15 | Stop reason: SDUPTHER

## 2024-07-30 ASSESSMENT — ENCOUNTER SYMPTOMS
ACTIVITY CHANGE: 0
SHORTNESS OF BREATH: 0
VOMITING: 0
APPETITE CHANGE: 0
NUMBNESS: 0
CHEST TIGHTNESS: 0
NAUSEA: 0
DIZZINESS: 0
HEADACHES: 0
WEAKNESS: 0
PALPITATIONS: 1

## 2024-07-30 NOTE — PATIENT INSTRUCTIONS
Read attached food info  Take Omeprazole daily before dinner x 2 months  See Cardiology  Let me know if no improvement or worsening in your symptoms in 10-14 days

## 2024-07-30 NOTE — PROGRESS NOTES
[address] [address]  [phone]       Reason for visit:   Chief Complaint   Patient presents with    Follow-up     ER      HPI   Samuel Koehler is a 49 y.o. male who presents with ER Follow Up   - ER Follow Up  9 d ago - developed chest pain  Had been having it for 7-10 d prior - had been away in [place]  It had been slowly getting worse  Went to [facility]  Located L chest, epigastric, center of back  Troponins were negative x 3  EKG negative for acute changes  CXR was negative  Rx'd Pepcid and Maalox - helped  Told it was Gastritis, Rx'd Pepcid  Did not speak to Cardiology  Symptoms returned 3-4 d later  Increased HR - 120s  Radiating to back  Returned to ER - [facility] - 3 d ago  BP was initially elevated but improved  Hrs were < 100  Given ASA  CTA negative for aneurysm or dissection  CXR normal  Troponins negative  Discharged home recommending Cardiology f/u  Symptoms improved but were mild yesterday  He took Pepcid and finished what the ER gave him - none in 2-3 days  Denies SOB  No N/V/D  Symptoms not worse with activity  Does not know if related to eating     Past Medical History:   Diagnosis Date    ADHD 10/04/2019    COVID     2023    Lipid disorder     Mitral valvular regurgitation 04/25/2023    PFO (patent foramen ovale) 04/25/2023    Pulmonary hypertension (CMS/HCC) 04/25/2023     Past Surgical History:   Procedure Laterality Date    CATARACT EXTRACTION Bilateral     2022    KNEE ARTHROSCOPY W/ MENISCECTOMY Right     LIVER SURGERY      MITRAL VALVE REPLACEMENT Left 03/20/2023    left atrial appendage clip placement      Social History     Socioeconomic History    Marital status:      Spouse name: Not on file    Number of children: Not on file    Years of education: Not on file    Highest education level: Not on file   Occupational History    Not on file   Tobacco Use    Smoking status: Former     Packs/day: 1.00      Years: 15.00     Additional pack years: 0.00     Total pack years: 15.00     Types: Cigarettes     Quit date: 2019     Years since quittin.8    Smokeless tobacco: Never   Substance and Sexual Activity    Alcohol use: Not Currently    Drug use: No    Sexual activity: Yes     Partners: Female   Other Topics Concern    Not on file   Social History Narrative    Do you wear your seatbelt? Yes    Do you have smoke detector in your home? Yes    Do you have a carbon monoxide detector in your home? 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      Social Determinants of Health     Financial Resource Strain: Not on file   Food Insecurity: Not on file   Transportation Needs: Not on file   Physical Activity: Not on file   Stress: Not on file   Social Connections: Not on file   Intimate Partner Violence: Not on file   Housing Stability: Not on file     Family History   Problem Relation Age of Onset    Heart disease Biological Father     Diabetes Biological Father     Heart disease Paternal Grandmother      Penicillins  Current Outpatient Medications   Medication Sig Dispense Refill    acetaminophen (TYLENOL) 325 mg tablet 975 mg.      atorvastatin (LIPITOR) 40 mg tablet Take 1 tablet (40 mg total) by mouth daily. 90 tablet 3    clindamycin (Cleocin HCL) 150 mg capsule Take 1 capsule (150 mg total) by mouth See admin instr. Take 4 tablets ( 600mg ) one hour prior to dentist 4 capsule 6    metoprolol succinate XL (TOPROL-XL) 25 mg 24 hr tablet TAKE 1 TABLET(25 MG) BY MOUTH DAILY 90 tablet 3    omeprazole (PriLOSEC) 40 mg capsule Take 1 capsule (40 mg total) by mouth daily before breakfast. 30 capsule 1    warfarin (COUMADIN) 10 mg tablet Take 10 mg three times weekly, take 15 mg 4 times weekly OR as directed by office based on INR result. 270 tablet 3     No current facility-administered medications for this visit.       Review of Systems   Constitutional:  Negative for activity  change and appetite change.   Respiratory:  Negative for chest tightness and shortness of breath.    Cardiovascular:  Positive for chest pain and palpitations. Negative for leg swelling.   Gastrointestinal:  Negative for nausea and vomiting.   Neurological:  Negative for dizziness, weakness, numbness and headaches.     Objective   Vitals:    07/30/24 1321   BP: 124/74   BP Location: Left upper arm   Patient Position: Sitting   Pulse: 86   Temp: 36.7 °C (98 °F)   TempSrc: Oral   SpO2: 97%   Weight: 112 kg (246 lb)   Height: 1.829 m (6')       Physical Exam  Vitals reviewed.   Constitutional:       General: He is not in acute distress.     Appearance: He is well-developed. He is obese. He is not ill-appearing.   Cardiovascular:      Rate and Rhythm: Normal rate and regular rhythm.      Heart sounds: Normal heart sounds. No murmur heard.     No friction rub. No gallop.   Pulmonary:      Effort: Pulmonary effort is normal.      Breath sounds: Normal breath sounds. No wheezing or rales.   Chest:      Chest wall: No tenderness.   Abdominal:      General: There is no distension.      Palpations: There is no mass.      Tenderness: There is abdominal tenderness in the epigastric area. There is no guarding or rebound.      Hernia: No hernia is present.   Musculoskeletal:      Right lower leg: No edema.      Left lower leg: No edema.   Neurological:      Mental Status: He is alert and oriented to person, place, and time.         Procedures    Lab Results   Component Value Date    WBC 4.9 10/05/2023    HGB 13.8 10/05/2023    HCT 40.8 10/05/2023     10/05/2023    CHOL 131 07/06/2023    TRIG 65 07/06/2023    HDL 53 07/06/2023    ALT 19 10/05/2023    AST 20 10/05/2023     10/05/2023    K 4.2 10/05/2023     10/05/2023    CREATININE 0.84 10/05/2023    BUN 22 10/05/2023    CO2 23 10/05/2023    TSH 1.230 12/18/2015    PSA 0.33 10/05/2023    INR 2.5 07/20/2024    HGBA1C 5.4 12/18/2015         Assessment   Problem  List Items Addressed This Visit       PFO (patent foramen ovale)     Other Visit Diagnoses       Epigastric abdominal pain    -  Primary    New Problem  2 ER visits in 10 d  Cardiac eval normal - trop neg x 5, EKG stable, CTA neg  Mild imp Pepcid  Epi TTP  Omeprazole QDx 2 m    Relevant Medications    omeprazole (PriLOSEC) 40 mg capsule        I reviewed both ER visits with him. He was TTP over epigastric area.  Has not been taking NSAIDs. Will stop pepcid PRN and take dedicated Omeprazole 40 daily. Warning signs reviewed. Will let me know if no improvement in 10-14 days. Food choices hand out given.  Encouraged appointment with Cardiology.        Suresh Holland MD  7/30/2024

## 2024-08-01 ENCOUNTER — OFFICE VISIT (OUTPATIENT)
Dept: PULMONOLOGY | Facility: CLINIC | Age: 49
End: 2024-08-01
Payer: COMMERCIAL

## 2024-08-01 VITALS
RESPIRATION RATE: 16 BRPM | OXYGEN SATURATION: 97 % | SYSTOLIC BLOOD PRESSURE: 120 MMHG | HEART RATE: 76 BPM | WEIGHT: 246 LBS | DIASTOLIC BLOOD PRESSURE: 76 MMHG | HEIGHT: 72 IN | BODY MASS INDEX: 33.32 KG/M2

## 2024-08-01 DIAGNOSIS — G47.34 NOCTURNAL HYPOXEMIA: ICD-10-CM

## 2024-08-01 DIAGNOSIS — Q21.12 PFO (PATENT FORAMEN OVALE): ICD-10-CM

## 2024-08-01 DIAGNOSIS — I34.0 NONRHEUMATIC MITRAL VALVE REGURGITATION: ICD-10-CM

## 2024-08-01 DIAGNOSIS — I27.20 PULMONARY HYPERTENSION (CMS/HCC): ICD-10-CM

## 2024-08-01 DIAGNOSIS — G47.33 OSA (OBSTRUCTIVE SLEEP APNEA): Primary | ICD-10-CM

## 2024-08-01 PROCEDURE — 94375 RESPIRATORY FLOW VOLUME LOOP: CPT | Performed by: INTERNAL MEDICINE

## 2024-08-01 PROCEDURE — 3008F BODY MASS INDEX DOCD: CPT | Performed by: INTERNAL MEDICINE

## 2024-08-01 PROCEDURE — 99204 OFFICE O/P NEW MOD 45 MIN: CPT | Mod: 25 | Performed by: INTERNAL MEDICINE

## 2024-08-01 NOTE — PROGRESS NOTES
Dear Dr. Holland, Suresh EDWARD MD,    Thank you for the opportunity to evaluate your patient Samuel Koehler a 49 y.o.  male seen today in the office for severe obstructive sleep apnea with nocturnal oxygen desaturation.      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  He was diagnosed in 2011 with severe obstructive sleep apnea with AHI 49 events per hour even more severe in the supine position associated with disturbed sleep architecture and nocturnal oxygen desaturation.  The application of auto titrating CPAP is sufficient to eliminate apnea and hypoxemia and restore sleep to virtually normal architecture.  With excellent ongoing compliance with an old S9 ResMed AutoSet he still has some daytime somnolence most significant in the early afternoon but is only achieving approximately 6 hours and 30 minutes of use per night.  His wife notes that he still occasionally particularly when supine but he has no nocturnal respiratory awakening and no morning headaches.  He denies leg jerks, sleepwalking, night terrors, hypnagogic or hypnopompic hallucinations, cataplexy or sleep paralysis.  He is using a ResMed AirFit P10 nasal interface which she finds quite comfortable.  He understands care and cleaning of his equipment and replacement as needed.    He has had no significant coughing, wheezing, chest tightness or dyspnea.  His upper airway has been clear.  He did have some chest/epigastric pain which is improved with the administration of omeprazole.  He denies leg pain or swelling, palpitations, dizziness or syncope.  He denies fever, chills, recent weight change or constitutional symptoms.    Again, he has had no tobacco use since February 2014.  He has no alcohol  or substance abuse.  He has eliminated his caffeine.  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  Environmental survey is negative.      Past Medical History:   Diagnosis Date    ADHD 10/04/2019    COVID         Lipid disorder     Mitral valvular regurgitation 2023    PFO (patent foramen ovale) 2023    Pulmonary hypertension (CMS/HCC) 2023     Past Surgical History:   Procedure Laterality Date    CATARACT EXTRACTION Bilateral         KNEE ARTHROSCOPY W/ MENISCECTOMY Right     LIVER SURGERY      MITRAL VALVE REPLACEMENT Left 2023    left atrial appendage clip placement      Allergies   Allergen Reactions    Penicillins      Other reaction(s): Hives     Social History     Socioeconomic History    Marital status:      Spouse name: None    Number of children: None    Years of education: None    Highest education level: None   Tobacco Use    Smoking status: Former     Packs/day: 1.00     Years: 15.00     Additional pack years: 0.00     Total pack years: 15.00     Types: Cigarettes     Quit date: 2019     Years since quittin.8    Smokeless tobacco: Never   Substance and Sexual Activity    Alcohol use: Not Currently    Drug use: No    Sexual activity: Yes     Partners: Female   Social History Narrative    Do you wear your seatbelt? Yes    Do you have smoke detector in your home? Yes    Do you have a carbon monoxide detector in your home? 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      Family History   Problem Relation Age of Onset    Heart disease Biological Father     Diabetes Biological Father     Heart disease Paternal Grandmother        Current Outpatient Medications:     acetaminophen (TYLENOL) 325 mg tablet, 975 mg., Disp: , Rfl:     atorvastatin (LIPITOR) 40 mg tablet, Take 1 tablet (40 mg total) by mouth daily., Disp: 90 tablet, Rfl: 3    metoprolol succinate XL (TOPROL-XL) 25 mg 24 hr tablet, TAKE 1  TABLET(25 MG) BY MOUTH DAILY, Disp: 90 tablet, Rfl: 3    omeprazole (PriLOSEC) 40 mg capsule, Take 1 capsule (40 mg total) by mouth daily before breakfast., Disp: 30 capsule, Rfl: 1    warfarin (COUMADIN) 10 mg tablet, Take 10 mg three times weekly, take 15 mg 4 times weekly OR as directed by office based on INR result., Disp: 270 tablet, Rfl: 3    clindamycin (Cleocin HCL) 150 mg capsule, Take 1 capsule (150 mg total) by mouth See admin instr. Take 4 tablets ( 600mg ) one hour prior to dentist (Patient not taking: Reported on 8/1/2024), Disp: 4 capsule, Rfl: 6  Immunization History   Administered Date(s) Administered    Influenza Vaccine Quadrivalent Preservative Free 3 Yr And Up 10/08/2015    Influenza Vaccine Quadrivalent Preservative Free 6 Mons and Up 10/04/2019, 09/15/2022, 10/05/2023    Influenza Vaccine Quadrivalent Preservative Free 6-35 Months 11/10/2014    Influenza, Unspecified 11/10/2014    SARS-COV-2 (COVID-19) VACCINE, MODERNA MONOVALENT 02/06/2021, 03/05/2021    Td, Not Adsorbed 01/14/2014    Tdap 12/18/2015, 02/01/2024    Zoster Vaccine Recombinant Adjuvanted (Shingrix) 10/05/2023, 03/12/2024     Immunization status: up to date and documented.    Review of Systems:  A Full 14 point review of systems was obtained and is negative then otherwise stated in the HPI.      Physical Examination:  This is a well-developed well-nourished 49 y.o.year old male in no acute distress.  Vital Signs: Visit Vitals  /76   Pulse 76   Resp 16   Ht 1.829 m (6')   Wt 112 kg (246 lb)   SpO2 97%   BMI 33.36 kg/m²     HEENT:  Normocephalic, atraumatic.  PERRLA. There is no nasal mucosal inflammation or pharyngitis.  There is no evidence of thrush.  Crowding of the posterior oropharynx with macroglossia greatest at the base.  Eyes:  Sclera anicteric, conjunctiva pink, pupils equal and reactive to light  Neck: no adenopathy, no JVD.  Chest:  Lungs clear to auscultation and percussion bilaterally, no crackles, rubs,  rhonchi or wheezing..  Cor: Regular rhythm.  There is no accentuated P2 or right ventricular heave.  Abd: Soft and  nontender, nondistended, normal bowel sounds.  There is no hepatosplenomegaly.  Extremities: no clubbing, cyanosis or edema.  Skin: no rash, jaundice or petechiae.  Neuro: alert and oriented, no focal deficits.  Lymph nodes:There is no palpable peripheral lymphadenopathy.  Joints: no deformities, no warmth or erythema.  Psych: normal affect, good eye contact.      Diagnostic Data:  I have independently reviewed the physiologic sleep data including efficacy and compliance from a ResMed S9 AutoSet 5-20 cm H2O with EPR level 3, full-time and standard response.  He achieves an average of 6 hours 29 minutes of use per night.  Maximum and 95th percentile pressures are below the maximum set pressure.  At median pressure of 8.3 cm H2O there is negligible leak.  These pressures are sufficient to markedly reduce AHI to 7 events per hour.  There are no significant central apneic episodes, Cheyne-Martinez respirations or other pathologic sleep.  Approximately 20 minutes time was spent addressing this.    Labs:  I have personally reviewed all available pertinent patient laboratory results. Labs of note discussed below:  ABG Results    No lab values to display.       CBC Results         10/05/23 12/18/15 03/31/15     1333      WBC 4.9 4.6 3.8    RBC 4.50 4.63 4.46    HGB 13.8 13.6 13.0    HCT 40.8 42.6 39.9    MCV 90.7 92 90    MCH 30.7 29.4 29.1    MCHC 33.8 31.9 32.6     220 165          BMP Results         10/05/23 07/06/23 12/18/15     1333 1219      142 142    K 4.2 4.5 4.8    Cl 109 105 100    CO2 23 22 23    Glucose 88 88 86    BUN 22 11 12    Creatinine 0.84 0.86 0.90    Calcium 9.2 -- --    EGFR 106 105 105       121           Comment for Glucose at 1333 on 10/05/23:               Fasting reference interval                 Pulmonary function testing:  Spirometric data performed today is good  technical study.  Spirograms reach plateau.  Forced vital capacity 4.58 L (85% predicted).  FEV1 3.79 L (90% predicted).  FEV1 percent 82%.  FEF 25 to 75%, 110% predicted.  Flow volume loops are normal.  Impression: This is a normal study.    Imaging:  I have independently reviewed all pertinent imaging.      Echocardiogram performed April 2024 reveals mildly dilated right ventricle but with normal systolic function.  RVSP approximately 28 mmHg      Assessment:   #1.  Severe obstructive sleep apnea with nocturnal hypoxemia with risk factors that include crowding of the posterior oropharynx and some increased nuchal and truncal body mass.  The application of CPAP is sufficient to markedly improve apnea, eliminate hypoxemia and restore sleep to virtually normal architecture.  With reasonable but not quite optimal compliance he has had marked improvement in his symptoms with no new relevant problems, complications or sequela.  He does have old equipment and will be evaluated for an update today.  He is presently hemodynamically and electrophysiologically stable with no clinical evidence of endocrinopathy, neuromuscular disease or significant pulmonary hypertension.    #2.  Pulmonary hypertension; mild consistent with WHO group 2 with preserved right ventricular systolic pressure and recent RVSP approximately 28 mmHg.      Plan:  #1.  Until repeat sleep study and new equipment can be issued, I would recommend continuing with his present S9 AutoSet trying to achieve closer to 7 to 8 hours of use per night.  #2.  He will continue with a ResMed AirFit P10 nasal interface which she is quite comfortable with and with no significant leak.  #3.  Continue optimizing sleep hygiene, maintaining optimal body mass index with some judicious weight loss, avoiding sleeping supine, limiting alcohol and sedatives, limiting caffeine to before noon and absolutely not operating machinery especially a motor vehicle if feeling fatigued.  #4.   I would recommend annual seasonal flu vaccine as well as all other appropriate immunizations.  #5.  Otherwise continue the present plan for now.    I have discussed my findings, concerns and recommendations at length with Bill with shared decision making.  All questions were asked and answered.  Evaluation of supplies and equipment were performed.  Education and instructions have been provided. Follow-up has been arranged.  I will keep you apprised of my findings.  Please let me know if you have any questions.      Many thanks. Best regards.     Sincerely,    Surinder Garcia MD    8/1/2024    This letter was generated using speech recognition software.  Please excuse any typographical errors.

## 2024-08-03 NOTE — ED PROVIDER NOTES
Addendum 8/3/2024 1451:  Patient called requesting result of CTA over the phone.  He states he is at another ER for continued pain.  He did put the ER doctor on the phone and gave permission to give all info to the ER doctor.       Grace Lee PA-C  08/03/24 [id number]

## 2024-08-06 RX ORDER — CLINDAMYCIN HYDROCHLORIDE 150 MG/1
CAPSULE ORAL
Qty: 4 CAPSULE | Refills: 6 | Status: SHIPPED | OUTPATIENT
Start: 2024-08-06

## 2024-08-14 ENCOUNTER — HOSPITAL ENCOUNTER (OUTPATIENT)
Dept: SLEEP MEDICINE | Facility: HOSPITAL | Age: 49
Discharge: HOME | End: 2024-08-14
Attending: INTERNAL MEDICINE
Payer: COMMERCIAL

## 2024-08-14 PROCEDURE — G0399 HOME SLEEP TEST/TYPE 3 PORTA: HCPCS

## 2024-08-19 DIAGNOSIS — E78.2 MIXED HYPERLIPIDEMIA: ICD-10-CM

## 2024-08-19 LAB — INR PPP: 4.4

## 2024-08-19 RX ORDER — ATORVASTATIN CALCIUM 40 MG/1
40 TABLET, FILM COATED ORAL DAILY
Qty: 90 TABLET | Refills: 3 | Status: SHIPPED | OUTPATIENT
Start: 2024-08-19

## 2024-08-20 ENCOUNTER — ANTICOAGULATION VISIT (OUTPATIENT)
Dept: CARDIOLOGY | Facility: CLINIC | Age: 49
End: 2024-08-20
Payer: COMMERCIAL

## 2024-08-20 ENCOUNTER — ANTICOAGULATION VISIT (OUTPATIENT)
Dept: CARDIOLOGY | Facility: CLINIC | Age: 49
End: 2024-08-20

## 2024-08-20 DIAGNOSIS — Z95.2 HISTORY OF MITRAL VALVE REPLACEMENT WITH MECHANICAL VALVE: Primary | ICD-10-CM

## 2024-08-20 NOTE — PROGRESS NOTES
I spoke to Alfonzo - he tells me that he realized last night that he has been taking too much warfaring - was taking 15 mg on his 10 mg nights.  He will hold his dose tonight and then go back to his regular schedule.  He will recheck his INR in 1 week.

## 2024-08-28 LAB — INR PPP: 4.3

## 2024-08-29 ENCOUNTER — ANTICOAGULATION VISIT (OUTPATIENT)
Dept: CARDIOLOGY | Facility: CLINIC | Age: 49
End: 2024-08-29
Payer: COMMERCIAL

## 2024-08-29 VITALS — BODY MASS INDEX: 33.32 KG/M2 | WEIGHT: 246 LBS | HEIGHT: 72 IN

## 2024-08-29 DIAGNOSIS — Z95.2 HISTORY OF MITRAL VALVE REPLACEMENT WITH MECHANICAL VALVE: Primary | ICD-10-CM

## 2024-08-29 NOTE — PROGRESS NOTES
Received notification that patient checked his INR yesterday, and we received notification today, that patient's INR was 4.3    Patient denies any bruising or bleeding.  Patient stated he smoked Weed and drank some ETOH.  Patient stated that he held his Coumadin yesterday.  Told patient that I would like to make some adjustments to his Regimen.  Told patient that I would like for him to take 15 mg every Monday, Wednesday, and Friday, and 10 mg all other days.  Told patient to check his INR Wednesday 09/05/2024.  Patient verbally understands and has no further questions at this time.      Also called [facility] and spoke with April and corrected fax number, so that next time patient test, we should receive results.

## 2024-08-31 LAB — INR PPP: 2.7

## 2024-09-03 ENCOUNTER — ANTICOAGULATION VISIT (OUTPATIENT)
Dept: CARDIOLOGY | Facility: CLINIC | Age: 49
End: 2024-09-03
Payer: COMMERCIAL

## 2024-09-03 DIAGNOSIS — Z95.2 HISTORY OF MITRAL VALVE REPLACEMENT WITH MECHANICAL VALVE: Primary | ICD-10-CM

## 2024-09-04 LAB — INR PPP: 3.6

## 2024-09-05 ENCOUNTER — ANTICOAGULATION VISIT (OUTPATIENT)
Dept: CARDIOLOGY | Facility: CLINIC | Age: 49
End: 2024-09-05
Payer: COMMERCIAL

## 2024-09-05 DIAGNOSIS — Z95.2 HISTORY OF MITRAL VALVE REPLACEMENT WITH MECHANICAL VALVE: Primary | ICD-10-CM

## 2024-09-05 NOTE — PROGRESS NOTES
INR 3.6. I encountered patient's voicemail. I asked him to stick with coumadin 15 mg MWF, 10 mg AOD but repeat in one week. I asked him to call me back with q/c.

## 2024-09-11 LAB — INR PPP: 2.1

## 2024-09-12 ENCOUNTER — ANTICOAGULATION VISIT (OUTPATIENT)
Dept: CARDIOLOGY | Facility: CLINIC | Age: 49
End: 2024-09-12
Payer: COMMERCIAL

## 2024-09-12 ENCOUNTER — ANTICOAGULATION VISIT (OUTPATIENT)
Dept: CARDIOLOGY | Facility: CLINIC | Age: 49
End: 2024-09-12

## 2024-09-12 DIAGNOSIS — Z95.2 HISTORY OF MITRAL VALVE REPLACEMENT WITH MECHANICAL VALVE: Primary | ICD-10-CM

## 2024-09-12 NOTE — PROGRESS NOTES
Received notification from access center that patient checked his INR yesterday (09/11/2024), which was 2.1.    Patient denies any abnormal bruising/bleeding, or changes in medication, or diet.  Of note patient was on Clindamycin in August and about two weeks ago patient drank more ETOH then he usually does and smoked some weed.  Told patient to take an extra 5 mg of Coumadin tonight, to total in 15 mg tonight and then resume back to his regular regimen of 15 mg every Monday, Wednesday, and Friday, and 10 mg all other days.  Asked patient to recheck his INR in one week on 9/18/2024.  Patient verbally understands and has no further questions at this time.

## 2024-09-19 ENCOUNTER — ANTICOAGULATION VISIT (OUTPATIENT)
Dept: CARDIOLOGY | Facility: CLINIC | Age: 49
End: 2024-09-19
Payer: COMMERCIAL

## 2024-09-19 DIAGNOSIS — Z95.2 HISTORY OF MITRAL VALVE REPLACEMENT WITH MECHANICAL VALVE: Primary | ICD-10-CM

## 2024-09-19 LAB — INR PPP: 2.7

## 2024-09-19 NOTE — PROGRESS NOTES
Spoke with patient. INR 2.7. He will remain on 15 mg MWF, 10 mg AOD. He will repeat in two weeks.

## 2024-09-25 ENCOUNTER — TELEPHONE (OUTPATIENT)
Dept: PULMONOLOGY | Facility: CLINIC | Age: 49
End: 2024-09-25
Payer: COMMERCIAL

## 2024-09-26 ENCOUNTER — ANTICOAGULATION VISIT (OUTPATIENT)
Dept: CARDIOLOGY | Facility: CLINIC | Age: 49
End: 2024-09-26
Payer: COMMERCIAL

## 2024-09-26 DIAGNOSIS — Z95.2 HISTORY OF MITRAL VALVE REPLACEMENT WITH MECHANICAL VALVE: Primary | ICD-10-CM

## 2024-09-26 LAB — INR PPP: 2.4

## 2024-10-09 LAB — INR PPP: 2.6

## 2024-10-10 ENCOUNTER — ANTICOAGULATION VISIT (OUTPATIENT)
Dept: CARDIOLOGY | Facility: CLINIC | Age: 49
End: 2024-10-10
Payer: COMMERCIAL

## 2024-10-10 DIAGNOSIS — Z95.2 HISTORY OF MITRAL VALVE REPLACEMENT WITH MECHANICAL VALVE: Primary | ICD-10-CM

## 2024-10-10 NOTE — PROGRESS NOTES
INR 2.6 yesterday    Denies abnormal bleeding/bruising, changes in diet or medications.    Pt. Continue with normal regimen 15 mg M/W/F & 10 mg all other days.    Recheck INR in two weeks.    Pt. Verbally understands and has no further questions or concerns.

## 2024-10-14 NOTE — PATIENT INSTRUCTIONS
Patient Education   Mediterranean Diet  A Mediterranean diet refers to food and lifestyle choices that are based on the traditions of countries located on the [place]. It focuses on eating more fruits, vegetables, whole grains, beans, nuts, seeds, and heart-healthy fats, and eating less dairy, meat, eggs, and processed foods with added sugar, salt, and fat. This way of eating has been shown to help prevent certain conditions and improve outcomes for people who have chronic diseases, like kidney disease and heart disease.  What are tips for following this plan?  Reading food labels  Check the serving size of packaged foods. For foods such as rice and pasta, the serving size refers to the amount of cooked product, not dry.  Check the total fat in packaged foods. Avoid foods that have saturated fat or trans fats.  Check the ingredient list for added sugars, such as corn syrup.  Shopping    Buy a variety of foods that offer a balanced diet, including:  Fresh fruits and vegetables (produce).  Grains, beans, nuts, and seeds. Some of these may be available in unpackaged forms or large amounts (in bulk).  Fresh seafood.  Poultry and eggs.  Low-fat dairy products.  Buy whole ingredients instead of prepackaged foods.  Buy fresh fruits and vegetables in-season from local [employer] markets.  Buy plain frozen fruits and vegetables.  If you do not have access to quality fresh seafood, buy precooked frozen shrimp or canned fish, such as tuna, salmon, or sardines.  Stock your pantry so you always have certain foods on hand, such as olive oil, canned tuna, canned tomatoes, rice, pasta, and beans.  Cooking  Cook foods with extra-virgin olive oil instead of using butter or other vegetable oils.  Have meat as a side dish, and have vegetables or grains as your main dish. This means having meat in small portions or adding small amounts of meat to foods like pasta or stew.  Use beans or vegetables instead of meat in common dishes  like chili or lasagna.  [place] with different cooking methods. Try roasting, broiling, steaming, and sautéing vegetables.  Add frozen vegetables to soups, stews, pasta, or rice.  Add nuts or seeds for added healthy fats and plant protein at each meal. You can add these to yogurt, salads, or vegetable dishes.  Marinate fish or vegetables using olive oil, lemon juice, garlic, and fresh herbs.  Meal planning  Plan to eat one vegetarian meal one day each week. Try to work up to two vegetarian meals, if possible.  Eat seafood two or more times a week.  Have healthy snacks readily available, such as:  Vegetable sticks with hummus.  Greek yogurt.  Fruit and nut trail mix.  Eat balanced meals throughout the week. This includes:  Fruit: 2-3 servings a day.  Vegetables: 4-5 servings a day.  Low-fat dairy: 2 servings a day.  Fish, poultry, or lean meat: 1 serving a day.  Beans and legumes: 2 or more servings a week.  Nuts and seeds: 1-2 servings a day.  Whole grains: 6-8 servings a day.  Extra-virgin olive oil: 3-4 servings a day.  Limit red meat and sweets to only a few servings a month.  Lifestyle    Cook and eat meals together with your family, when possible.  Drink enough fluid to keep your urine pale yellow.  Be physically active every day. This includes:  Aerobic exercise like running or swimming.  Leisure activities like gardening, walking, or housework.  Get 7-8 hours of sleep each night.  If recommended by your health care provider, drink red wine in moderation. This means 1 glass a day for nonpregnant women and 2 glasses a day for men. A glass of wine equals 5 oz (150 mL).  What foods should I eat?  Fruits  Apples. Apricots. Avocado. Berries. Bananas. Cherries. Dates. Figs. Grapes. Kina. Melon. Oranges. Peaches. Plums. Pomegranate.  Vegetables  Artichokes. Beets. Broccoli. Cabbage. Carrots. Eggplant. Green beans. Chard. Kale. Spinach. Onions. Leeks. Peas. Squash. Tomatoes. Peppers.  Radishes.  Grains  Whole-grain pasta. Brown rice. Bulgur wheat. Polenta. Couscous. Whole-wheat bread. Oatmeal. Quinoa.  Meats and other proteins  Beans. Almonds. Sunflower seeds. Pine nuts. Peanuts. Cod. [place]. Scallops. Shrimp. Tuna. Tilapia. Clams. Oysters. Eggs. Poultry without skin.  Dairy  Low-fat milk. Cheese. Greek yogurt.  Fats and oils  Extra-virgin olive oil. Avocado oil. Grapeseed oil.  Beverages  Water. Red wine. Herbal tea.  Sweets and desserts  Greek yogurt with honey. Baked apples. Poached pears. Trail mix.  Seasonings and condiments  Basil. Cilantro. Coriander. Cumin. Mint. Parsley. Jostin. Rosemary. Tarragon. Garlic. Oregano. Thyme. Pepper. Balsamic vinegar. Tahini. Hummus. Tomato sauce. Olives. Mushrooms.  The items listed above may not be a complete list of foods and beverages you can eat. Contact a dietitian for more information.  What foods should I limit?  This is a list of foods that should be eaten rarely or only on special occasions.  Fruits  Fruit canned in syrup.  Vegetables  Deep-fried potatoes (french fries).  Grains  Prepackaged pasta or rice dishes. Prepackaged cereal with added sugar. Prepackaged snacks with added sugar.  Meats and other proteins  Beef. Pork. Lamb. Poultry with skin. Hot dogs. [place].  Dairy  Ice cream. Sour cream. Whole milk.  Fats and oils  Butter. Canola oil. Vegetable oil. Beef fat (tallow). Lard.  Beverages  Juice. Sugar-sweetened soft drinks. Beer. Liquor and spirits.  Sweets and desserts  Cookies. Cakes. Pies. Candy.  Seasonings and condiments  Mayonnaise. Pre-made sauces and marinades.  The items listed above may not be a complete list of foods and beverages you should limit. Contact a dietitian for more information.  Summary  The Mediterranean diet includes both food and lifestyle choices.  Eat a variety of fresh fruits and vegetables, beans, nuts, seeds, and whole grains.  Limit the amount of red meat and sweets that you eat.  If recommended by your health  care provider, drink red wine in moderation. This means 1 glass a day for nonpregnant women and 2 glasses a day for men. A glass of wine equals 5 oz (150 mL).  This information is not intended to replace advice given to you by your health care provider. Make sure you discuss any questions you have with your health care provider.  Document Revised: 01/22/2021 Document Reviewed: 11/19/2020  Elsevier Patient Education © 2023 [employer]

## 2024-10-14 NOTE — PROGRESS NOTES
Cardiology Note       Reason for visit: History of mechanical mitral valve replacement.      Alfonzo returns today for follow-up.  He had gone to the emergency room in July because he was having chest pain that was sharp that radiated through to his back.  His workup there was negative including a CT angiogram of the chest to look for any evidence of aortic dissection which was negative.    Alfonzo tells me that he had been under a lot of stress and thinks that it was mostly GI related.  His brother-in-law have recently  from cancer and was only 44 years old and things have been stressful at work.    Since that time he has been doing well.  There has not been any chest discomfort or shortness of breath.    On exam his blood pressure was 120/80.    The EKG revealed normal sinus rhythm with nonspecific ST-T wave changes which was unchanged from his previous tracing.        Past Medical History:   Diagnosis Date    ADHD 10/04/2019    COVID         Lipid disorder     Mitral valvular regurgitation 2023    PFO (patent foramen ovale) 2023    Pulmonary hypertension (CMS/HCC) 2023     Past Surgical History   Procedure Laterality Date    Cataract extraction Bilateral         Knee arthroscopy w/ meniscectomy Right     Liver surgery      Mitral valve replacement Left 2023    left atrial appendage clip placement      Social History     Tobacco Use    Smoking status: Former     Current packs/day: 0.00     Average packs/day: 1 pack/day for 15.0 years (15.0 ttl pk-yrs)     Types: Cigarettes     Start date: 2004     Quit date: 2019     Years since quittin.0    Smokeless tobacco: Never   Substance Use Topics    Alcohol use: Not Currently    Drug use: No      Family History   Problem Relation Name Age of Onset    Heart disease Biological Father      Diabetes Biological Father      Heart disease Paternal Grandmother       Penicillins  Current Outpatient Medications   Medication  Instructions    atorvastatin (LIPITOR) 40 mg, oral, Daily    clindamycin (CLEOCIN) 150 mg capsule TAKE 4 CAPSULES BY MOUTH 1 HOUR PRIOR TO DENTIST    metoprolol succinate XL (TOPROL-XL) 25 mg, oral, Daily    omeprazole (PRILOSEC) 20 mg, Daily before breakfast    warfarin (COUMADIN) 10 mg tablet Take 10 mg three times weekly, take 15 mg 4 times weekly OR as directed by office based on INR result.          Review of Systems   Cardiovascular:  Negative for chest pain, dyspnea on exertion, irregular heartbeat, leg swelling, near-syncope, orthopnea, palpitations, paroxysmal nocturnal dyspnea and syncope.      Objective    Vitals:    10/15/24 1053   BP: 120/80   Pulse: 65   Resp: 16      Wt Readings from Last 3 Encounters:   10/15/24 115 kg (253 lb)   08/29/24 112 kg (246 lb)   08/01/24 112 kg (246 lb)      Physical Exam  Neck:      Vascular: No carotid bruit or JVD.   Cardiovascular:      Rate and Rhythm: Normal rate and regular rhythm.      Pulses:           Carotid pulses are 2+ on the right side and 2+ on the left side.       Dorsalis pedis pulses are 2+ on the right side and 2+ on the left side.        Posterior tibial pulses are 2+ on the right side and 2+ on the left side.      Heart sounds: S1 normal and S2 normal. No murmur heard.     No friction rub. No gallop.   Pulmonary:      Effort: Pulmonary effort is normal.      Breath sounds: Normal breath sounds.   Musculoskeletal:      Right lower leg: No edema.      Left lower leg: No edema.   Skin:     General: Skin is warm and dry.   Neurological:      Mental Status: He is alert.   Psychiatric:         Behavior: Behavior normal.                     CTA of the chest 7/27/2024  1. No intrathoracic or abdominal aortic aneurysm or dissection.   2. No acute process in the chest, abdomen or pelvis.         Transthoracic echocardiogram. 4/9/2024.    Left Ventricle: Normal ventricle size. Normal wall thickness. Preserved systolic function. Estimated EF 60-65%. Abnormal  septal motion consistent with post-operative status. Unable to assess diastolic filling pattern.    Right Ventricle: Mildly dilated ventricle size. Normal systolic function.    Left Atrium: Mildly dilated atrium.    Right Atrium: Mildly dilated atrium.    Mitral Valve: Trace transvalvular regurgitation. No stenosis. Mean gradient = 6.00. A mechanical prosthetic valve is present. The prosthetic valve appears normal.    Aortic Valve: Tricuspid valve. No regurgitation. No stenosis. Calculated dimensionless index = 0.61.    Tricuspid Valve: Normal structure. Mild regurgitation. Estimated RVSP = 28 mmHg.    Aorta: Aortic root normal. Sinuses of Valsalva normal-sized. Ascending aorta normal-sized.    IVC/SVC: Inferior vena cava is a small caliber vessel (<1.7cm). Inferior vena cava demonstrates normal respiratory collapse.    SBE prophylaxis according to the ACC/AHA guidelines is recommended.    Compared to the transesophageal echocardiogram dated 3/16/2023: Flail posterior leaflet is no longer present and as patient now has a mechanical valve in the mitral position.           Cardiac catheterization.  3/17/2023.  Widely patent epicardial coronary arteries.     Transesophageal echocardiogram.  3/17/2023.  Marked prolapse of the P2 scallop.  Flail posterior leaflet with severe mitral regurgitation.  Aortic valve is normal in appearance.  Tricuspid valve is normal.  RV systolic pressure 50 mmHg.  Left atrium normal in size.  No thrombus.  Left ventricle is normal in size and thickness.  Normal left ventricular systolic function.  No regional wall motion abnormalitie         2) CARDIAC FINDINGS:  CORONARY CALCIUM COMPOSITE AGATSTON SCORE: 0  Left Main: 0  LAD: 0  LCX: 0  RCA: 0  3) NON-CARDIAC FINDINGS  AORTA: Unremarkable.  HEART: Normal size. No pericardial effusion.  LUNG FIELDS: Unremarkable.  LYMPH NODES: Unremarkable.  OSSEOUS STRUCTURES: Minimal degenerative changes of the partially imaged  thoracic spine.    ECG.   Normal sinus rhythm, nonspecific ST-T wave abnormality.  No change compared to previous EKG.     Assessment/Plan    Atypical chest pain  I suspect that Alfonzo's chest pain is noncardiac in origin and may be GI.    His CT angiogram did not reveal any evidence of aortic dissection.    Alfonzo had a cardiac catheterization last March that showed widely patent epicardial coronary arteries.    His symptoms have improved since he has been taking the omeprazole.    History of mitral valve replacement with mechanical valve  Valve sounds are crisp.  Echocardiogram in April revealed a normally functioning mechanical valve.    Alfonzo has been compliant with his warfarin and the INR goal is 2.5-3.5.    I once again reminded him about the need for SBE prophylaxis prior to going to the dentist.    Mixed hyperlipidemia  Continue atorvastatin 40 mg daily.  I have given Alfonzo prescription to get a follow-up lipid profile and told him he needs to make sure that he has a follow-up appointment to see you for follow-up as well.    Alfonzo will be coming due for his day-to-day care.  He will return to see me in 6 months time or sooner if there is a problem.  I will continue to keep you informed of his progress.  He will call with any questions or concerns in the interim.            Thank you for allowing me to participate in the care of this patient.  If you have any questions please don't hesitate to contact me.    I spent 35 minutes on this date of service performing the following activities: obtaining history, performing examination, entering orders, documenting, preparing for visit, obtaining / reviewing records, providing counseling and education and communicating results.     Luis Antonio Millan MD [facility]   10/15/2024  1:16 PM

## 2024-10-15 ENCOUNTER — OFFICE VISIT (OUTPATIENT)
Dept: CARDIOLOGY | Facility: CLINIC | Age: 49
End: 2024-10-15
Payer: COMMERCIAL

## 2024-10-15 VITALS
HEIGHT: 72 IN | BODY MASS INDEX: 34.27 KG/M2 | SYSTOLIC BLOOD PRESSURE: 120 MMHG | HEART RATE: 65 BPM | DIASTOLIC BLOOD PRESSURE: 80 MMHG | RESPIRATION RATE: 16 BRPM | WEIGHT: 253 LBS

## 2024-10-15 DIAGNOSIS — R10.13 EPIGASTRIC ABDOMINAL PAIN: ICD-10-CM

## 2024-10-15 DIAGNOSIS — R07.89 ATYPICAL CHEST PAIN: ICD-10-CM

## 2024-10-15 DIAGNOSIS — Z79.01 WARFARIN ANTICOAGULATION: Primary | ICD-10-CM

## 2024-10-15 DIAGNOSIS — Z95.2 HISTORY OF MITRAL VALVE REPLACEMENT WITH MECHANICAL VALVE: ICD-10-CM

## 2024-10-15 DIAGNOSIS — E78.2 MIXED HYPERLIPIDEMIA: ICD-10-CM

## 2024-10-15 LAB
ATRIAL RATE: 65
P AXIS: 55
PR INTERVAL: 194
QRS DURATION: 84
QT INTERVAL: 430
QTC CALCULATION(BAZETT): 447
R AXIS: -28
T WAVE AXIS: 29
VENTRICULAR RATE: 65

## 2024-10-15 PROCEDURE — 99214 OFFICE O/P EST MOD 30 MIN: CPT | Performed by: INTERNAL MEDICINE

## 2024-10-15 PROCEDURE — 93000 ELECTROCARDIOGRAM COMPLETE: CPT | Performed by: INTERNAL MEDICINE

## 2024-10-15 PROCEDURE — 3008F BODY MASS INDEX DOCD: CPT | Performed by: INTERNAL MEDICINE

## 2024-10-15 RX ORDER — OMEPRAZOLE 20 MG/1
20 CAPSULE, DELAYED RELEASE ORAL
COMMUNITY
Start: 2024-10-15 | End: 2024-10-24

## 2024-10-15 ASSESSMENT — ENCOUNTER SYMPTOMS
SYNCOPE: 0
PND: 0
PALPITATIONS: 0
NEAR-SYNCOPE: 0
ORTHOPNEA: 0
IRREGULAR HEARTBEAT: 0
DYSPNEA ON EXERTION: 0

## 2024-10-15 NOTE — ASSESSMENT & PLAN NOTE
I suspect that Alfonzo's chest pain is noncardiac in origin and may be GI.    His CT angiogram did not reveal any evidence of aortic dissection.    Alfonzo had a cardiac catheterization last March that showed widely patent epicardial coronary arteries.    His symptoms have improved since he has been taking the omeprazole.

## 2024-10-15 NOTE — LETTER
October 15, 2024     Suresh Holland MD  [address]  [facility] [address]    Patient: Samuel Koehler  YOB: 1975  Date of Visit: 10/15/2024      Dear Dr. Holland:    Thank you for referring Samuel Koehler to me for evaluation. Below are my notes for this consultation.    If you have questions, please do not hesitate to call me. I look forward to following your patient along with you.         Sincerely,        Luis Antonio Millan MD        CC: No Recipients    Luis Antonio Millan MD  10/15/2024  1:16 PM  Sign when Signing Visit     Cardiology Note       Reason for visit: History of mechanical mitral valve replacement.      Alfonzo returns today for follow-up.  He had gone to the emergency room in July because he was having chest pain that was sharp that radiated through to his back.  His workup there was negative including a CT angiogram of the chest to look for any evidence of aortic dissection which was negative.    Alfonzo tells me that he had been under a lot of stress and thinks that it was mostly GI related.  His brother-in-law have recently  from cancer and was only 44 years old and things have been stressful at work.    Since that time he has been doing well.  There has not been any chest discomfort or shortness of breath.    On exam his blood pressure was 120/80.    The EKG revealed normal sinus rhythm with nonspecific ST-T wave changes which was unchanged from his previous tracing.        Past Medical History:   Diagnosis Date   • ADHD 10/04/2019   • COVID        • Lipid disorder    • Mitral valvular regurgitation 2023   • PFO (patent foramen ovale) 2023   • Pulmonary hypertension (CMS/HCC) 2023     Past Surgical History   Procedure Laterality Date   • Cataract extraction Bilateral        • Knee arthroscopy w/ meniscectomy Right    • Liver surgery     • Mitral valve replacement Left 2023    left atrial appendage clip placement       Social History     Tobacco Use   • Smoking status: Former     Current packs/day: 0.00     Average packs/day: 1 pack/day for 15.0 years (15.0 ttl pk-yrs)     Types: Cigarettes     Start date: 2004     Quit date: 2019     Years since quittin.0   • Smokeless tobacco: Never   Substance Use Topics   • Alcohol use: Not Currently   • Drug use: No      Family History   Problem Relation Name Age of Onset   • Heart disease Biological Father     • Diabetes Biological Father     • Heart disease Paternal Grandmother       Penicillins  Current Outpatient Medications   Medication Instructions   • atorvastatin (LIPITOR) 40 mg, oral, Daily   • clindamycin (CLEOCIN) 150 mg capsule TAKE 4 CAPSULES BY MOUTH 1 HOUR PRIOR TO DENTIST   • metoprolol succinate XL (TOPROL-XL) 25 mg, oral, Daily   • omeprazole (PRILOSEC) 20 mg, Daily before breakfast   • warfarin (COUMADIN) 10 mg tablet Take 10 mg three times weekly, take 15 mg 4 times weekly OR as directed by office based on INR result.          Review of Systems   Cardiovascular:  Negative for chest pain, dyspnea on exertion, irregular heartbeat, leg swelling, near-syncope, orthopnea, palpitations, paroxysmal nocturnal dyspnea and syncope.      Objective    Vitals:    10/15/24 1053   BP: 120/80   Pulse: 65   Resp: 16      Wt Readings from Last 3 Encounters:   10/15/24 115 kg (253 lb)   24 112 kg (246 lb)   24 112 kg (246 lb)      Physical Exam  Neck:      Vascular: No carotid bruit or JVD.   Cardiovascular:      Rate and Rhythm: Normal rate and regular rhythm.      Pulses:           Carotid pulses are 2+ on the right side and 2+ on the left side.       Dorsalis pedis pulses are 2+ on the right side and 2+ on the left side.        Posterior tibial pulses are 2+ on the right side and 2+ on the left side.      Heart sounds: S1 normal and S2 normal. No murmur heard.     No friction rub. No gallop.   Pulmonary:      Effort: Pulmonary effort is normal.      Breath  sounds: Normal breath sounds.   Musculoskeletal:      Right lower leg: No edema.      Left lower leg: No edema.   Skin:     General: Skin is warm and dry.   Neurological:      Mental Status: He is alert.   Psychiatric:         Behavior: Behavior normal.                     CTA of the chest 7/27/2024  1. No intrathoracic or abdominal aortic aneurysm or dissection.   2. No acute process in the chest, abdomen or pelvis.         Transthoracic echocardiogram. 4/9/2024.  •  Left Ventricle: Normal ventricle size. Normal wall thickness. Preserved systolic function. Estimated EF 60-65%. Abnormal septal motion consistent with post-operative status. Unable to assess diastolic filling pattern.  •  Right Ventricle: Mildly dilated ventricle size. Normal systolic function.  •  Left Atrium: Mildly dilated atrium.  •  Right Atrium: Mildly dilated atrium.  •  Mitral Valve: Trace transvalvular regurgitation. No stenosis. Mean gradient = 6.00. A mechanical prosthetic valve is present. The prosthetic valve appears normal.  •  Aortic Valve: Tricuspid valve. No regurgitation. No stenosis. Calculated dimensionless index = 0.61.  •  Tricuspid Valve: Normal structure. Mild regurgitation. Estimated RVSP = 28 mmHg.  •  Aorta: Aortic root normal. Sinuses of Valsalva normal-sized. Ascending aorta normal-sized.  •  IVC/SVC: Inferior vena cava is a small caliber vessel (<1.7cm). Inferior vena cava demonstrates normal respiratory collapse.  •  SBE prophylaxis according to the ACC/AHA guidelines is recommended.  •  Compared to the transesophageal echocardiogram dated 3/16/2023: Flail posterior leaflet is no longer present and as patient now has a mechanical valve in the mitral position.           Cardiac catheterization.  3/17/2023.  Widely patent epicardial coronary arteries.     Transesophageal echocardiogram.  3/17/2023.  Marked prolapse of the P2 scallop.  Flail posterior leaflet with severe mitral regurgitation.  Aortic valve is normal in  appearance.  Tricuspid valve is normal.  RV systolic pressure 50 mmHg.  Left atrium normal in size.  No thrombus.  Left ventricle is normal in size and thickness.  Normal left ventricular systolic function.  No regional wall motion abnormalitie         2) CARDIAC FINDINGS:  CORONARY CALCIUM COMPOSITE AGATSTON SCORE: 0  Left Main: 0  LAD: 0  LCX: 0  RCA: 0  3) NON-CARDIAC FINDINGS  AORTA: Unremarkable.  HEART: Normal size. No pericardial effusion.  LUNG FIELDS: Unremarkable.  LYMPH NODES: Unremarkable.  OSSEOUS STRUCTURES: Minimal degenerative changes of the partially imaged  thoracic spine.    ECG.  Normal sinus rhythm, nonspecific ST-T wave abnormality.  No change compared to previous EKG.     Assessment/Plan    Atypical chest pain  I suspect that Alfonzo's chest pain is noncardiac in origin and may be GI.    His CT angiogram did not reveal any evidence of aortic dissection.    Alfonzo had a cardiac catheterization last March that showed widely patent epicardial coronary arteries.    His symptoms have improved since he has been taking the omeprazole.    History of mitral valve replacement with mechanical valve  Valve sounds are crisp.  Echocardiogram in April revealed a normally functioning mechanical valve.    Alfonzo has been compliant with his warfarin and the INR goal is 2.5-3.5.    I once again reminded him about the need for SBE prophylaxis prior to going to the dentist.    Mixed hyperlipidemia  Continue atorvastatin 40 mg daily.  I have given Alfonzo prescription to get a follow-up lipid profile and told him he needs to make sure that he has a follow-up appointment to see you for follow-up as well.    Alfonzo will be coming due for his day-to-day care.  He will return to see me in 6 months time or sooner if there is a problem.  I will continue to keep you informed of his progress.  He will call with any questions or concerns in the interim.            Thank you for allowing me to participate in the care of this patient.   If you have any questions please don't hesitate to contact me.    I spent 35 minutes on this date of service performing the following activities: obtaining history, performing examination, entering orders, documenting, preparing for visit, obtaining / reviewing records, providing counseling and education and communicating results.     Luis Antonio Millan MD [facility]   10/15/2024  1:16 PM

## 2024-10-15 NOTE — ASSESSMENT & PLAN NOTE
Valve sounds are crisp.  Echocardiogram in April revealed a normally functioning mechanical valve.    Alfonzo has been compliant with his warfarin and the INR goal is 2.5-3.5.    I once again reminded him about the need for SBE prophylaxis prior to going to the dentist.

## 2024-10-15 NOTE — ASSESSMENT & PLAN NOTE
Continue atorvastatin 40 mg daily.  I have given Bill prescription to get a follow-up lipid profile and told him he needs to make sure that he has a follow-up appointment to see you for follow-up as well.

## 2024-10-24 ENCOUNTER — ANTICOAGULATION VISIT (OUTPATIENT)
Dept: CARDIOLOGY | Facility: CLINIC | Age: 49
End: 2024-10-24
Payer: COMMERCIAL

## 2024-10-24 ENCOUNTER — APPOINTMENT (EMERGENCY)
Dept: RADIOLOGY | Facility: HOSPITAL | Age: 49
End: 2024-10-24
Attending: EMERGENCY MEDICINE
Payer: COMMERCIAL

## 2024-10-24 ENCOUNTER — HOSPITAL ENCOUNTER (EMERGENCY)
Facility: HOSPITAL | Age: 49
Discharge: HOME | End: 2024-10-24
Attending: EMERGENCY MEDICINE | Admitting: EMERGENCY MEDICINE
Payer: COMMERCIAL

## 2024-10-24 ENCOUNTER — TELEPHONE (OUTPATIENT)
Dept: SCHEDULING | Facility: CLINIC | Age: 49
End: 2024-10-24
Payer: COMMERCIAL

## 2024-10-24 VITALS
HEART RATE: 72 BPM | OXYGEN SATURATION: 96 % | DIASTOLIC BLOOD PRESSURE: 94 MMHG | SYSTOLIC BLOOD PRESSURE: 142 MMHG | TEMPERATURE: 98 F | RESPIRATION RATE: 18 BRPM

## 2024-10-24 DIAGNOSIS — K20.90 ESOPHAGITIS: ICD-10-CM

## 2024-10-24 DIAGNOSIS — R07.9 CHEST PAIN, UNSPECIFIED TYPE: Primary | ICD-10-CM

## 2024-10-24 DIAGNOSIS — Z95.2 HISTORY OF MITRAL VALVE REPLACEMENT WITH MECHANICAL VALVE: Primary | ICD-10-CM

## 2024-10-24 DIAGNOSIS — K29.70 GASTRITIS, PRESENCE OF BLEEDING UNSPECIFIED, UNSPECIFIED CHRONICITY, UNSPECIFIED GASTRITIS TYPE: ICD-10-CM

## 2024-10-24 DIAGNOSIS — R91.1 NODULE OF LEFT LUNG: ICD-10-CM

## 2024-10-24 DIAGNOSIS — R10.13 EPIGASTRIC ABDOMINAL PAIN: ICD-10-CM

## 2024-10-24 LAB
ALBUMIN SERPL-MCNC: 4.7 G/DL (ref 3.5–5.7)
ALP SERPL-CCNC: 63 IU/L (ref 34–125)
ALT SERPL-CCNC: 40 IU/L (ref 7–52)
ANION GAP SERPL CALC-SCNC: 8 MEQ/L (ref 3–15)
APTT PPP: 38 SEC (ref 23–35)
AST SERPL-CCNC: 81 IU/L (ref 13–39)
BASOPHILS # BLD: 0.02 K/UL (ref 0.01–0.1)
BASOPHILS NFR BLD: 0.4 %
BILIRUB SERPL-MCNC: 1.3 MG/DL (ref 0.3–1.2)
BUN SERPL-MCNC: 19 MG/DL (ref 7–25)
CALCIUM SERPL-MCNC: 9.2 MG/DL (ref 8.6–10.3)
CHLORIDE SERPL-SCNC: 107 MEQ/L (ref 98–107)
CO2 SERPL-SCNC: 23 MEQ/L (ref 21–31)
CREAT SERPL-MCNC: 1.1 MG/DL (ref 0.7–1.3)
DIFFERENTIAL METHOD BLD: NORMAL
EGFRCR SERPLBLD CKD-EPI 2021: >60 ML/MIN/1.73M*2
EOSINOPHIL # BLD: 0.11 K/UL (ref 0.04–0.54)
EOSINOPHIL NFR BLD: 2.3 %
ERYTHROCYTE [DISTWIDTH] IN BLOOD BY AUTOMATED COUNT: 13.7 % (ref 11.6–14.4)
GLUCOSE SERPL-MCNC: 93 MG/DL (ref 70–99)
HCT VFR BLD AUTO: 42.5 % (ref 40.1–51)
HGB BLD-MCNC: 14 G/DL (ref 13.7–17.5)
IMM GRANULOCYTES # BLD AUTO: 0.02 K/UL (ref 0–0.08)
IMM GRANULOCYTES NFR BLD AUTO: 0.4 %
INR PPP: 2.1
LIPASE SERPL-CCNC: 43 U/L (ref 11–82)
LYMPHOCYTES # BLD: 1.2 K/UL (ref 1.2–3.5)
LYMPHOCYTES NFR BLD: 25 %
MCH RBC QN AUTO: 29.8 PG (ref 28–33.2)
MCHC RBC AUTO-ENTMCNC: 32.9 G/DL (ref 32.2–36.5)
MCV RBC AUTO: 90.4 FL (ref 83–98)
MONOCYTES # BLD: 0.41 K/UL (ref 0.3–1)
MONOCYTES NFR BLD: 8.5 %
NEUTROPHILS # BLD: 3.04 K/UL (ref 1.7–7)
NEUTS SEG NFR BLD: 63.4 %
NRBC BLD-RTO: 0 %
PLATELET # BLD AUTO: 192 K/UL (ref 150–350)
PMV BLD AUTO: 10.8 FL (ref 9.4–12.4)
POTASSIUM SERPL-SCNC: 4.9 MEQ/L (ref 3.5–5.1)
PROT SERPL-MCNC: 7.7 G/DL (ref 6–8.2)
PROTHROMBIN TIME: 23.5 SEC (ref 12.2–14.5)
RBC # BLD AUTO: 4.7 M/UL (ref 4.5–5.8)
SODIUM SERPL-SCNC: 138 MEQ/L (ref 136–145)
TROPONIN I SERPL HS-MCNC: 3.8 PG/ML
TROPONIN I SERPL HS-MCNC: 4.3 PG/ML
WBC # BLD AUTO: 4.8 K/UL (ref 3.8–10.5)

## 2024-10-24 PROCEDURE — 80053 COMPREHEN METABOLIC PANEL: CPT

## 2024-10-24 PROCEDURE — 36415 COLL VENOUS BLD VENIPUNCTURE: CPT

## 2024-10-24 PROCEDURE — 85025 COMPLETE CBC W/AUTO DIFF WBC: CPT

## 2024-10-24 PROCEDURE — 85610 PROTHROMBIN TIME: CPT | Performed by: EMERGENCY MEDICINE

## 2024-10-24 PROCEDURE — 83690 ASSAY OF LIPASE: CPT | Performed by: EMERGENCY MEDICINE

## 2024-10-24 PROCEDURE — 84484 ASSAY OF TROPONIN QUANT: CPT | Performed by: EMERGENCY MEDICINE

## 2024-10-24 PROCEDURE — 93005 ELECTROCARDIOGRAM TRACING: CPT | Performed by: EMERGENCY MEDICINE

## 2024-10-24 PROCEDURE — 63600105 HC IODINE BASED CONTRAST: Mod: JZ | Performed by: EMERGENCY MEDICINE

## 2024-10-24 PROCEDURE — 74174 CTA ABD&PLVS W/CONTRAST: CPT

## 2024-10-24 PROCEDURE — 71275 CT ANGIOGRAPHY CHEST: CPT

## 2024-10-24 PROCEDURE — 63700000 HC SELF-ADMINISTRABLE DRUG: Performed by: EMERGENCY MEDICINE

## 2024-10-24 PROCEDURE — 3E033GC INTRODUCTION OF OTHER THERAPEUTIC SUBSTANCE INTO PERIPHERAL VEIN, PERCUTANEOUS APPROACH: ICD-10-PCS | Performed by: EMERGENCY MEDICINE

## 2024-10-24 PROCEDURE — 93005 ELECTROCARDIOGRAM TRACING: CPT

## 2024-10-24 PROCEDURE — 80053 COMPREHEN METABOLIC PANEL: CPT | Performed by: EMERGENCY MEDICINE

## 2024-10-24 PROCEDURE — 96374 THER/PROPH/DIAG INJ IV PUSH: CPT | Mod: 59

## 2024-10-24 PROCEDURE — 99284 EMERGENCY DEPT VISIT MOD MDM: CPT | Mod: 25

## 2024-10-24 PROCEDURE — 25000000 HC PHARMACY GENERAL: Performed by: EMERGENCY MEDICINE

## 2024-10-24 PROCEDURE — 84484 ASSAY OF TROPONIN QUANT: CPT | Mod: 91 | Performed by: EMERGENCY MEDICINE

## 2024-10-24 PROCEDURE — 84484 ASSAY OF TROPONIN QUANT: CPT

## 2024-10-24 PROCEDURE — 85025 COMPLETE CBC W/AUTO DIFF WBC: CPT | Performed by: EMERGENCY MEDICINE

## 2024-10-24 PROCEDURE — 85730 THROMBOPLASTIN TIME PARTIAL: CPT | Performed by: EMERGENCY MEDICINE

## 2024-10-24 PROCEDURE — 71045 X-RAY EXAM CHEST 1 VIEW: CPT

## 2024-10-24 RX ORDER — FAMOTIDINE 10 MG/ML
20 INJECTION INTRAVENOUS ONCE
Status: COMPLETED | OUTPATIENT
Start: 2024-10-24 | End: 2024-10-24

## 2024-10-24 RX ORDER — LIDOCAINE HYDROCHLORIDE 20 MG/ML
10 SOLUTION OROPHARYNGEAL ONCE
Status: COMPLETED | OUTPATIENT
Start: 2024-10-24 | End: 2024-10-24

## 2024-10-24 RX ORDER — IOPAMIDOL 755 MG/ML
100 INJECTION, SOLUTION INTRAVASCULAR
Status: COMPLETED | OUTPATIENT
Start: 2024-10-24 | End: 2024-10-24

## 2024-10-24 RX ORDER — OMEPRAZOLE 20 MG/1
40 CAPSULE, DELAYED RELEASE ORAL
Qty: 60 CAPSULE | Refills: 0 | Status: SHIPPED | OUTPATIENT
Start: 2024-10-24 | End: 2024-10-28

## 2024-10-24 RX ORDER — ALUMINUM HYDROXIDE, MAGNESIUM HYDROXIDE, AND SIMETHICONE 1200; 120; 1200 MG/30ML; MG/30ML; MG/30ML
30 SUSPENSION ORAL ONCE
Status: COMPLETED | OUTPATIENT
Start: 2024-10-24 | End: 2024-10-24

## 2024-10-24 RX ADMIN — LIDOCAINE HYDROCHLORIDE 10 ML: 20 SOLUTION ORAL at 19:27

## 2024-10-24 RX ADMIN — ALUMINUM HYDROXIDE, MAGNESIUM HYDROXIDE, AND SIMETHICONE 30 ML: 1200; 120; 1200 SUSPENSION ORAL at 19:27

## 2024-10-24 RX ADMIN — IOPAMIDOL 100 ML: 755 INJECTION, SOLUTION INTRAVENOUS at 18:57

## 2024-10-24 RX ADMIN — FAMOTIDINE 20 MG: 10 INJECTION INTRAVENOUS at 19:27

## 2024-10-24 ASSESSMENT — ENCOUNTER SYMPTOMS
BLOOD IN STOOL: 0
NUMBNESS: 0
CHEST TIGHTNESS: 1
ABDOMINAL PAIN: 0
SHORTNESS OF BREATH: 0
BACK PAIN: 1
HEMATURIA: 0

## 2024-10-24 NOTE — ED ATTESTATION NOTE
"I have personally seen and examined Samuel Koehler. I personally performed the key components of the encounter and provided a substantive portion of the care and medical decision making for this patient.    I reviewed and agree with physician assistant’s assessment and plan of care, with any exceptions as documented below.     My focused history, examination, assessment, and plan of care of Samuel Koehler is as follows:    Brief History:   49-year-old male with history of valve replacement on Coumadin presented with intermittent central chest discomfort with radiation to his back for the past few days.  Went out for a walk and noticed exertional discomfort.  Denies any shortness of breath.  Patient stated that \"I belch regularly anyway\".  Denies any melena or bloody stools.  Has not had any alcohol in over a week.  Contacted his cardiologist and was sent to the emergency department for further evaluation and management  Focused Physical Exam:   Vitals:    10/24/24 1312   BP: (!) 147/73   Pulse: 94   Resp: 18   Temp: 36.7 °C (98 °F)   SpO2: 96%   Awake and alert good eye contact pleasant cooperative with normal complexion conjunctiva.  Regular rate and rhythm.  Intact full pulses in all 4 extremities.  Clear to auscultation throughout.  Soft nondistended abdomen with bilateral lower quadrant tenderness to palpation      Assessment / Plan / MDM:  Clinical history and exam concerning for but not limited to:  Monitorings EKG chest x-ray labs  Patient is on Coumadin will obtain CTA chest abdomen pelvis rule out aortic dissection           Lloyd Allen MD  10/24/24 1513    "

## 2024-10-24 NOTE — ED PROVIDER NOTES
"Emergency Medicine Note  HPI   HISTORY OF PRESENT ILLNESS     49-year-old male with history of valve replacement on Coumadin presented with intermittent central chest discomfort with radiation to his back for the past few days.  Went out for a walk and noticed exertional discomfort.  Denies any shortness of breath.  Patient stated that \"I belch regularly anyway\".  Denies any melena or bloody stools.  Has not had any alcohol in over a week.  Contacted his cardiologist and was sent to the emergency department for further evaluation and management          Patient History   PAST HISTORY     Reviewed from Nursing Triage:       Past Medical History:   Diagnosis Date    ADHD 10/04/2019    COVID         Lipid disorder     Mitral valvular regurgitation 2023    PFO (patent foramen ovale) 2023    Pulmonary hypertension (CMS/HCC) 2023       Past Surgical History   Procedure Laterality Date    Cataract extraction Bilateral         Knee arthroscopy w/ meniscectomy Right     Liver surgery      Mitral valve replacement Left 2023    left atrial appendage clip placement        Family History   Problem Relation Name Age of Onset    Heart disease Biological Father      Diabetes Biological Father      Heart disease Paternal Grandmother         Social History     Tobacco Use    Smoking status: Former     Current packs/day: 0.00     Average packs/day: 1 pack/day for 15.0 years (15.0 ttl pk-yrs)     Types: Cigarettes     Start date: 2004     Quit date: 2019     Years since quittin.0    Smokeless tobacco: Never   Substance Use Topics    Alcohol use: Not Currently    Drug use: No         Review of Systems   REVIEW OF SYSTEMS     Review of Systems   Respiratory:  Positive for chest tightness. Negative for shortness of breath.    Cardiovascular:  Positive for chest pain. Negative for leg swelling.   Gastrointestinal:  Negative for abdominal pain and blood in stool.   Genitourinary:  " Negative for hematuria.   Musculoskeletal:  Positive for back pain.   Neurological:  Negative for numbness.         VITALS     ED Vitals      Date/Time Temp Pulse Resp BP SpO2 [facility]   10/24/24 1312 36.7 °C (98 °F) 94 18 147/73 96 % GC          Pulse Ox %: 96 % (10/24/24 1912)  Pulse Ox Interpretation: Normal (10/24/24 1912)  Heart Rate: 94 (10/24/24 1912)  Rhythm Strip Interpretation: Normal Sinus Rhythm (10/24/24 1912)     Physical Exam   PHYSICAL EXAM     Physical Exam  Vitals and nursing note reviewed.   Constitutional:       Appearance: He is well-developed.   Cardiovascular:      Rate and Rhythm: Normal rate and regular rhythm.      Pulses:           Radial pulses are 2+ on the right side and 2+ on the left side.        Posterior tibial pulses are 2+ on the right side and 2+ on the left side.      Heart sounds: Normal heart sounds.   Pulmonary:      Effort: Pulmonary effort is normal.      Breath sounds: Normal breath sounds.   Chest:      Chest wall: No tenderness.   Abdominal:      Palpations: Abdomen is soft. There is no hepatomegaly or mass.      Tenderness: There is abdominal tenderness (Bilateral lower quadrant).   Musculoskeletal:         General: Normal range of motion.      Cervical back: Normal range of motion.      Right lower leg: No tenderness. No edema.      Left lower leg: No tenderness. No edema.   Skin:     General: Skin is warm.      Capillary Refill: Capillary refill takes less than 2 seconds.   Neurological:      General: No focal deficit present.      Mental Status: He is alert.   Psychiatric:         Mood and Affect: Mood normal.         Behavior: Behavior normal.           PROCEDURES     Procedures     DATA     Results       Procedure Component Value Units Date/Time    Protime-INR [[id number]]  (Abnormal) Collected: 10/24/24 1534    Specimen: Blood, Venous Updated: 10/24/24 1620     PT 23.5 sec      INR 2.1     Comment: Moderate Intensity Anticoagulation = 2.0 to 3.0, High Intensity = 2.5 to  3.5       PTT [[id number]]  (Abnormal) Collected: 10/24/24 1534    Specimen: Blood, Venous Updated: 10/24/24 1620     PTT 38 sec      Comment: The Standard Therapeutic Range for Heparin is 74 to 106 seconds.       Lipase [[id number]]  (Normal) Collected: 10/24/24 1323    Specimen: Blood, Venous Updated: 10/24/24 1617     Lipase 43 U/L     HS Troponin I (with 2 hour reflex) [[id number]]  (Normal) Collected: 10/24/24 1323    Specimen: Blood, Venous Updated: 10/24/24 1420     High Sens Troponin I 4.3 pg/mL     Comprehensive metabolic panel [[id number]]  (Abnormal) Collected: 10/24/24 1323    Specimen: Blood, Venous Updated: 10/24/24 1415     Sodium 138 mEQ/L      Comment: Moderate hemolysis, results may be affected.         Potassium 4.9 mEQ/L      Comment: Results obtained on plasma. Plasma Potassium values may be up to 0.4 mEQ/L less than serum values. The differences may be greater for patients with high platelet or white cell counts.  Moderate hemolysis, results may be affected.         Chloride 107 mEQ/L      CO2 23 mEQ/L      BUN 19 mg/dL      Creatinine 1.1 mg/dL      Glucose 93 mg/dL      Calcium 9.2 mg/dL      AST (SGOT) 81 IU/L      Comment: Moderate hemolysis, results may be affected.         ALT (SGPT) 40 IU/L      Alkaline Phosphatase 63 IU/L      Total Protein 7.7 g/dL      Comment: Test performed on plasma which typically contains approximately 0.4 g/dL more protein than serum.        Albumin 4.7 g/dL      Bilirubin, Total 1.3 mg/dL      eGFR >60.0 mL/min/1.73m*2      Comment: Calculation based on the Chronic Kidney Disease Epidemiology Collaboration (CKD-EPI) equation refit without adjustment for race.        Anion Gap 8 mEQ/L     CBC and differential [[id number]] Collected: 10/24/24 1323    Specimen: Blood, Venous Updated: 10/24/24 1352     WBC 4.80 K/uL      RBC 4.70 M/uL      Hemoglobin 14.0 g/dL      Hematocrit 42.5 %      MCV 90.4 fL      MCH 29.8 pg      MCHC 32.9 g/dL      RDW 13.7 %      Platelets  192 K/uL      MPV 10.8 fL      Differential Type Auto     nRBC 0.0 %      Immature Granulocytes 0.4 %      Neutrophils 63.4 %      Lymphocytes 25.0 %      Monocytes 8.5 %      Eosinophils 2.3 %      Basophils 0.4 %      Immature Granulocytes, Absolute 0.02 K/uL      Neutrophils, Absolute 3.04 K/uL      Lymphocytes, Absolute 1.20 K/uL      Monocytes, Absolute 0.41 K/uL      Eosinophils, Absolute 0.11 K/uL      Basophils, Absolute 0.02 K/uL             Imaging Results              CT ANGIOGRAPHY CHEST/ABDOMEN/PELVIS WITH AND WITHOUT IV CONTRAST (Final result)  Result time 10/24/24 19:16:27      Final result                   Impression:    IMPRESSION:    1. CTA CHEST:  No aortic aneurysm or dissection. No other acute abnormality.  Postoperative changes in heart, as above. 3 mm perifissural nodule in left lung,  for which follow-up imaging is recommended as per patient risk factors and  current Fleischner Society guidelines. See above.  2. CTA ABDOMEN AND PELVIS:  No aortic aneurysm or dissection. No other acute  abnormality. See above.               Narrative:    CLINICAL HISTORY:  49-year-old male on Coumadin with central chest pain  radiating to back and bilateral lower abdominal tenderness; aortic aneurysm or  dissection suspected.    COMPARISON:  Radiograph of chest dated 10/24/2024; complete ultrasound  examination of abdomen with Doppler evaluation dated 12/24/2015.    TECHNIQUE:  CT angiography of the chest, abdomen and pelvis is performed both  prior to and following the uneventful administration of intravenous contrast.  Coronal and sagittal images are reformatted. Three-dimensional advanced  post-processing utilizing a combination of MIP, MPR and/or volume rendering  techniques is performed with physician participation and supervision to better  evaluate anatomy and disease processes. One or more dose reduction techniques  (e.g., automated exposure control, adjustment of the mA and/or kV according to  the  patient size, use of iterative reconstruction technique) are utilized for  this examination.    CONTRAST:  100 mL Isovue-370 IV contrast    COMMENT:    CTA CHEST:    Thoracic Aorta and Great Vessels:  No precontrast hyperattenuation is seen to  suggest intramural hematoma formation. Aorta and great vessels are patent and  normal in caliber. No evidence of aortic aneurysm or dissection. No appreciable  atherosclerotic changes. No filling defects in central pulmonary arteries to  suggest pulmonary embolism.    Heart:  Normal cardiac size. Postoperative changes include prosthetic mitral  valve and left atrial appendage clip. No pericardial effusion.    Lungs:  No acute or lobar consolidation. 3 mm nodule along left major fissure on  image 121 of axial lung 1.25 mm series. Minimal scarring or subsegmental  atelectasis in left lower lobe. Central tracheobronchial tree is patent.    Pleura:  No pleural effusion or pneumothorax.    Ne, Mediastinum and Lymph Nodes:  No suspect mass or lymphadenopathy.    Osseous Structures:  No suspicious findings. Very mild thoracic dextroscoliosis.  Mild degenerative changes. Status post median sternotomy.    Additional Findings:  None.    CTA ABDOMEN AND PELVIS:    Abdominal Aorta:  Aorta is patent and normal in caliber. No evidence of aortic  aneurysm or dissection. Celiac, superior mesenteric and inferior mesenteric  arteries are patent and normal in caliber. Single bilateral renal arteries are  patent. Minimal atherosclerotic calcification in distal abdominal aorta and  common iliac arteries.    Liver:  16 x 18 mm lesion in subcapsular aspect of inferior right lobe on image  54 of axial delayed abdomen series demonstrates discontinuous, peripheral  enhancement on arterial phase postcontrast images with more homogeneous  enhancement on delayed images, favoring a hemangioma. Subcentimeter,  hypoattenuating lesion located more superiorly in right lobe on image 33 of  delayed series is  too small to accurately characterize by CT but statistically  likely to represent a cyst.    Gallbladder and Biliary Tree:  Gallbladder is partially contracted and contains  small, calcified calculus.    Spleen:  No suspicious findings.    Pancreas:  No suspicious findings.    Adrenal Glands:  No suspicious findings.    Kidneys:  No suspicious findings.    Pelvic Viscera:  No suspicious findings.    Bowel:  No bowel obstruction. Very small hiatal hernia. No evidence of  appendicitis. No colonic diverticulosis or diverticulitis.    Peritoneum, Retroperitoneum and Lymph Nodes:  No acute inflammatory process,  pneumoperitoneum or ascites. No suspect lymphadenopathy.    Osseous Structures:  No suspicious findings. Degenerative changes.    Additional Findings:  Very small, fat-containing umbilical/periumbilical hernia.  Very small, fat-containing right inguinal hernia.                                       X-RAY CHEST 1 VIEW (Final result)  Result time 10/24/24 15:34:21      Final result                   Impression:    IMPRESSION:  No evidence of acute disease in the chest.                           Narrative:    CLINICAL HISTORY: CP    COMPARISON: 2012    COMMENT:    TECHNIQUE: Single frontal view of the chest was performed.      LUNGS AND PLEURA: Lungs are clear. No edema or consolidation. No pleural  effusion. No pneumothorax.    CARDIOMEDIASTINUM: Heart top normal in size. Sternotomy wires, left atrial  appendage clip and prosthetic cardiac valve noted.    SKELETON/OTHER: No acute osseous abnormality.                                      ECG 12 lead          Scoring tools                                  ED Course & MDM   MDM / ED COURSE / CLINICAL IMPRESSION / DISPO     Medical Decision Making  49-year-old male with intermittent chest pain with more recent exertional component without shortness of breath or associated with nausea  Already anticoagulated on Coumadin but with pain radiating to his back worrisome for  possible aortic injury      Amount and/or Complexity of Data Reviewed  Labs: ordered. Decision-making details documented in ED Course.  Radiology: ordered.  ECG/medicine tests: ordered.        ED Course as of 10/24/24 1930   u Oct 24, 2024   1503 High Sens Troponin I: 4.3  1st [HL]   1503 Unremarkable CBC and CMP [HL]   1822 High Sens Troponin I: 3.8  2nd trop, no significant change from 1st   [HL]   1853 X-RAY CHEST 1 VIEW  LUNGS AND PLEURA: Lungs are clear. No edema or consolidation. No pleural  effusion. No pneumothorax.     CARDIOMEDIASTINUM: Heart top normal in size. Sternotomy wires, left atrial  appendage clip and prosthetic cardiac valve noted.     SKELETON/OTHER: No acute osseous abnormality.     --  IMPRESSION:  No evidence of acute disease in the chest.                        Exam Ended: 10/24/24 15:21     [HL]   1911 Results of troponin relayed to patient and his family  Patient stated that previously he had experienced similar symptoms for which some viscous oral medication helped relieve his discomfort    Will give GI cocktail [HL]   1921 CT ANGIOGRAPHY CHEST/ABDOMEN/PELVIS WITH AND WITHOUT IV CONTRAST  IMPRESSION:     1. CTA CHEST:  No aortic aneurysm or dissection. No other acute abnormality.  Postoperative changes in heart, as above. 3 mm perifissural nodule in left lung,  for which follow-up imaging is recommended as per patient risk factors and  current Fleischner Society guidelines. See above.  2. CTA ABDOMEN AND PELVIS:  No aortic aneurysm or dissection. No other acute  abnormality. See above.      Exam Ended: 10/24/24 18:56     [HL]   1930 Discussed incidental findings with patient and his mom  He stated that he has had hemangioma removed previously that was the size of a golf ball  Advised patient to follow-up with PCP regarding follow-up examination for incidental pulmonary nodule finding    Questions and concerns answered [HL]      ED Course User Index  [HL] Lloyd Allen MD     Clinical  Impression      Chest pain, unspecified type   Esophagitis   Gastritis, presence of bleeding unspecified, unspecified chronicity, unspecified gastritis type   Nodule of left lung - 3 mm, very small, please contact your primary regarding the necessity to further follow up     _________________       ED Disposition   Discharge                       Lloyd Allen MD  10/24/24 [id number]

## 2024-10-24 NOTE — TELEPHONE ENCOUNTER
Pt of Dr. Millan w/hx atypical chest pain (poss GI per last OV note 10/15 from Dr. Millan), CT coronary calcium score of 0, mech MVR on warfarin AC, and mixed hyperlipidemia. Pt calling office today w/symptoms of back and chest pain that started 3-4 days ago. He would like to be seen in office,as he has travel plans coming up this weekend and is concerned about leaving with his current symptoms.    Per pt, he developed persistent chest and back discomfort that is worsened with activity such as walking. Pain located directly behind left shoulder blade and under his left breast and midsternum, described as a dull pain and rated 2-3/10. Pain does not radiate through chest. Pain is persistent, but improves with tylenol.    Pt states that his dog passed away yesterday, but symptoms started before that. In past, chest pain has been associated with stress.    Denied nausea, cold symptoms, diaphoresis, SOB or other associated symptoms with his chest and back pain. Unable to think of any physical strain or activity to trigger this discomfort.     BP running 115-120/65-75mmhg.    I advised Mr. Koehler to please report to ED for an evaluation. He will go to [facility] ED.    Thank you.

## 2024-10-24 NOTE — PROGRESS NOTES
"Samuel Koehler   [phone]    Your doctor has referred you for a CT examination that requires the injection of an iodinated contrast material into your bloodstream. This iodinated contrast material, sometimes referred to as \"x-ray dye\" allows for better interpretation of the x-ray films or CT images and results in a more accurate interpretation of the examination.     Without the use of iodinated contrast (x-ray dye), the examination may be less informative and result in a suboptimal examination, and possibly a delay in diagnosis and, if needed, treatment.     The iodinated contrast material is given through a small needle or catheter placed into a vein, usually on the inside of the elbow, on the back of hand, or in a vein in the foot or lower leg.    The most common, though still rare, potential reaction to an intravenous contrast injection is an allergic-like reaction. Most allergic-like reactions are mild, though a small subset of people can have more severe reactions. Mild reactions include mild / scattered hives, itching, scratchy throat, sneezing and nasal congestion. More severe reactions include facial swelling, severe difficulty breathing, wheezing and anaphylactic shock. Those with a prior history allergic-like reaction to the same class of contrast media (such as CT contrast or MRI contrast) are at the highest risk for an allergic reaction.     If you believe you had an allergic reaction to contrast in the past, please let our staff know. We can determine if this increases your risk for a future reaction and provide steps to decrease the risk. This may delay your examination, but it decreases the risk of having a new and possibly more severe reaction to the contrast injection.    People with a history of prior allergic reactions to other substances (such as unrelated medications and food) and patients with a history of asthma have slightly increased risk for an allergic reaction from contrast " material when compared with the general population, but do not require to be pretreated prior to a contrast injection.    You should notify the physician, nurse or technologist if you have ever had any of these conditions or if you have any questions.

## 2024-10-25 ENCOUNTER — TELEPHONE (OUTPATIENT)
Dept: FAMILY MEDICINE | Facility: CLINIC | Age: 49
End: 2024-10-25
Payer: COMMERCIAL

## 2024-10-25 NOTE — PROGRESS NOTES
Spoke with pt, in regards to his INR on 10/23/24 of 2.2 & on 10/24/24 of 2.1, which pt.'s goal is 2.5 to 3.5    Pt denies any bruising/bleeding, changes in mediation or diet.  Pt stated he took extra 5 mg last night which pt took total of 15 mg.  Told pt to continue with his normal Coumadin dosing of 15 mg every M/W/F, and 10 mg all the other days.  Asked pt to recheck his INR next week on 10/31/24.  Pt verbally understands and has no further questions at this time.    Of note pt was in the hospital on 10/24/24 for CP, made pt a f/u appointment with Claudia our nurse practitioner for 11/6/24 at 9:00 AM.

## 2024-10-25 NOTE — TELEPHONE ENCOUNTER
Spoke with patient and performed ED follow-up call.  Patient doing well  feels a lot better and he fully understands discharge instructions.  No additional needs identified at this time.  Patient scheduled follow up on 10/28/2024 with PCP     Reviewed PCP contact information, office hours, after-hours access and discussed use of Urgent Care. No additional follow-up needed at this time.

## 2024-10-26 LAB
ATRIAL RATE: 80
P AXIS: 62
PR INTERVAL: 180
QRS DURATION: 86
QT INTERVAL: 386
QTC CALCULATION(BAZETT): 445
R AXIS: -14
T WAVE AXIS: 59
VENTRICULAR RATE: 80

## 2024-10-28 ENCOUNTER — OFFICE VISIT (OUTPATIENT)
Dept: FAMILY MEDICINE | Facility: CLINIC | Age: 49
End: 2024-10-28
Payer: COMMERCIAL

## 2024-10-28 VITALS
TEMPERATURE: 97.5 F | WEIGHT: 256.4 LBS | SYSTOLIC BLOOD PRESSURE: 114 MMHG | HEART RATE: 87 BPM | HEIGHT: 72 IN | BODY MASS INDEX: 34.73 KG/M2 | OXYGEN SATURATION: 97 % | DIASTOLIC BLOOD PRESSURE: 80 MMHG

## 2024-10-28 DIAGNOSIS — R07.9 CHEST PAIN, UNSPECIFIED TYPE: Primary | ICD-10-CM

## 2024-10-28 DIAGNOSIS — R10.13 DYSPEPSIA: ICD-10-CM

## 2024-10-28 DIAGNOSIS — R91.1 LUNG NODULE: ICD-10-CM

## 2024-10-28 DIAGNOSIS — R10.13 EPIGASTRIC ABDOMINAL PAIN: ICD-10-CM

## 2024-10-28 PROCEDURE — 90656 IIV3 VACC NO PRSV 0.5 ML IM: CPT | Performed by: FAMILY MEDICINE

## 2024-10-28 PROCEDURE — 90471 IMMUNIZATION ADMIN: CPT | Performed by: FAMILY MEDICINE

## 2024-10-28 PROCEDURE — 3008F BODY MASS INDEX DOCD: CPT | Performed by: FAMILY MEDICINE

## 2024-10-28 PROCEDURE — 99214 OFFICE O/P EST MOD 30 MIN: CPT | Mod: 25 | Performed by: FAMILY MEDICINE

## 2024-10-28 RX ORDER — OMEPRAZOLE 40 MG/1
40 CAPSULE, DELAYED RELEASE ORAL
COMMUNITY
Start: 2024-10-28 | End: 2024-10-28 | Stop reason: SDUPTHER

## 2024-10-28 RX ORDER — OMEPRAZOLE 40 MG/1
40 CAPSULE, DELAYED RELEASE ORAL
Qty: 30 CAPSULE | Refills: 1 | Status: SHIPPED | OUTPATIENT
Start: 2024-10-28 | End: 2025-02-12

## 2024-10-28 ASSESSMENT — ENCOUNTER SYMPTOMS
ADENOPATHY: 0
ARTHRALGIAS: 0
DIARRHEA: 0
ACTIVITY CHANGE: 0
FEVER: 0
VOMITING: 0
SORE THROAT: 0
CHILLS: 0
RHINORRHEA: 0
DYSURIA: 0
SHORTNESS OF BREATH: 0
PALPITATIONS: 0
NUMBNESS: 0
ABDOMINAL PAIN: 0
CONSTIPATION: 0
COUGH: 0
BLOOD IN STOOL: 0
FREQUENCY: 0
BACK PAIN: 0
NAUSEA: 0
WEAKNESS: 0
DIZZINESS: 0

## 2024-10-28 NOTE — PATIENT INSTRUCTIONS
Take Omeprazole 40 mg daily x 2 months total  See Cardiology next week  ER if any return of pain or shortness of breath  Flu shot today

## 2024-10-28 NOTE — PROGRESS NOTES
[place] [address]  [phone]       Reason for visit:   Chief Complaint   Patient presents with    follow up      HPI   Samuel Koehler is a 49 y.o. male who presents with ER Follow Up   - ER Follow Up  He was having intermittent chest pain for a few days  Radiate to his back, L shoulder  Went for a walk and had some discomfort doing that  No SOB, palpitations, dizziness  Had not checked BP at home  Went to the ER  ER BP was 147/73  INR 2.1  Lipase 43 - WNL  Troponin #1 - 4.3  AST 81 Bili 1.3  CBC WNL  CTA C/A/P - No cardiac or aortic abnormalities; 3 mm L nodule of lung; 1.6 x 1.8 mm inferior R lobe of liver hemangioma; possible cyst in R lobe of liver  CXR NAD  Troponin #2 3.8  Rx'd Omeprazole 40 daily  He no longer as any symptoms  He has not gone for a walk  Has been taking Omeprazole 80 as recommended by ER doctor - told to do this for a few days  He is belching less, no heart burn  Scheduled to see Cardiology next week     Past Medical History:   Diagnosis Date    ADHD 10/04/2019    COVID     2023    Lipid disorder     Mitral valvular regurgitation 04/25/2023    PFO (patent foramen ovale) 04/25/2023    Pulmonary hypertension (CMS/HCC) 04/25/2023     Past Surgical History   Procedure Laterality Date    Cataract extraction Bilateral     2022    Knee arthroscopy w/ meniscectomy Right     Liver surgery      Mitral valve replacement Left 03/20/2023    left atrial appendage clip placement      Social History     Socioeconomic History    Marital status:      Spouse name: Not on file    Number of children: Not on file    Years of education: Not on file    Highest education level: Not on file   Occupational History    Not on file   Tobacco Use    Smoking status: Former     Current packs/day: 0.00     Average packs/day: 1 pack/day for 15.0 years (15.0 ttl pk-yrs)     Types: Cigarettes     Start date: 9/25/2004     Quit date:  2019     Years since quittin.0    Smokeless tobacco: Never   Substance and Sexual Activity    Alcohol use: Not Currently    Drug use: No    Sexual activity: Yes     Partners: Female   Other Topics Concern    Not on file   Social History Narrative    Do you wear your seatbelt? Yes    Do you have smoke detector in your home? Yes    Do you have a carbon monoxide detector in your home? 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      Social Drivers of Health     Financial Resource Strain: Not on file   Food Insecurity: Not on file   Transportation Needs: Not on file   Physical Activity: Not on file   Stress: Not on file   Social Connections: Not on file   Intimate Partner Violence: Not on file   Housing Stability: Not on file     Family History   Problem Relation Name Age of Onset    Heart disease Biological Father      Diabetes Biological Father      Heart disease Paternal Grandmother       Penicillins  Current Outpatient Medications   Medication Sig Dispense Refill    atorvastatin (LIPITOR) 40 mg tablet Take 1 tablet (40 mg total) by mouth daily. 90 tablet 3    clindamycin (CLEOCIN) 150 mg capsule TAKE 4 CAPSULES BY MOUTH 1 HOUR PRIOR TO DENTIST 4 capsule 6    metoprolol succinate XL (TOPROL-XL) 25 mg 24 hr tablet TAKE 1 TABLET(25 MG) BY MOUTH DAILY 90 tablet 3    omeprazole (PriLOSEC) 40 mg capsule Take 1 capsule (40 mg total) by mouth daily before breakfast. 30 capsule 1    warfarin (COUMADIN) 10 mg tablet Take 10 mg three times weekly, take 15 mg 4 times weekly OR as directed by office based on INR result. 270 tablet 3     No current facility-administered medications for this visit.       Review of Systems   Constitutional:  Negative for activity change, chills and fever.   HENT:  Negative for congestion, hearing loss, rhinorrhea and sore throat.    Eyes:  Negative for visual disturbance.   Respiratory:  Negative for cough and shortness of breath.    Cardiovascular:   Positive for chest pain. Negative for palpitations and leg swelling.   Gastrointestinal:  Negative for abdominal pain, blood in stool, constipation, diarrhea, nausea and vomiting.   Genitourinary:  Negative for dysuria, frequency and testicular pain.   Musculoskeletal:  Negative for arthralgias and back pain.   Skin:  Negative for rash.   Allergic/Immunologic: Negative for environmental allergies and food allergies.   Neurological:  Negative for dizziness, weakness and numbness.   Hematological:  Negative for adenopathy.     Objective   Vitals:    10/28/24 0826 10/28/24 0850   BP: (!) 138/90 114/80   BP Location: Left upper arm Right upper arm   Patient Position: Sitting Sitting   Pulse: 87    Temp: 36.4 °C (97.5 °F)    TempSrc: Temporal    SpO2: 97%    Weight: 116 kg (256 lb 6.4 oz)    Height: 1.829 m (6')        Physical Exam  Vitals reviewed.   Constitutional:       General: He is not in acute distress.     Appearance: Normal appearance. He is well-developed. He is obese. He is not ill-appearing.   Cardiovascular:      Rate and Rhythm: Normal rate and regular rhythm.      Heart sounds: No murmur heard.     No friction rub. No gallop.      Comments: L mid axillary line click  Pulmonary:      Effort: Pulmonary effort is normal.      Breath sounds: Normal breath sounds. No wheezing or rales.   Musculoskeletal:      Right lower leg: No edema.      Left lower leg: No edema.   Neurological:      Mental Status: He is alert and oriented to person, place, and time.         Procedures    Lab Results   Component Value Date    WBC 4.80 10/24/2024    HGB 14.0 10/24/2024    HCT 42.5 10/24/2024     10/24/2024    CHOL 131 07/06/2023    TRIG 65 07/06/2023    HDL 53 07/06/2023    ALT 40 10/24/2024    AST 81 (H) 10/24/2024     10/24/2024    K 4.9 10/24/2024     10/24/2024    CREATININE 1.1 10/24/2024    BUN 19 10/24/2024    CO2 23 10/24/2024    TSH 1.230 12/18/2015    PSA 0.33 10/05/2023    INR 2.1 10/24/2024     HGBA1C 5.4 12/18/2015         Assessment   Problem List Items Addressed This Visit       Lung nodule     New Problem  Seen on CTA in ER  Liver findings - known to patient  Ex smoker  Repeat CT in 6 mo          Other Visit Diagnoses       Chest pain, unspecified type    -  Primary    New Problem  Last week  Went to ER  CV eval - neg  Trop, EKG, CTA  Sees Cardiology next wk  Pain resolved    Dyspepsia        New Problem  Belching more  Started on Omeprazole 40 by ER  Symptoms better  Educated on foods to avoid  Cont PPT x 2 m    Epigastric abdominal pain        New Problem  2 ER visits in 10 d  Cardiac eval normal - trop neg x 5, EKG stable, CTA neg  Mild imp Pepcid  Epi TTP  Omeprazole QDx 2 m    Relevant Medications    omeprazole (PriLOSEC) 40 mg capsule                Suresh Holland MD  10/28/2024

## 2024-10-31 LAB — INR PPP: 2.2

## 2024-11-01 ENCOUNTER — ANTICOAGULATION VISIT (OUTPATIENT)
Dept: CARDIOLOGY | Facility: CLINIC | Age: 49
End: 2024-11-01
Payer: COMMERCIAL

## 2024-11-01 DIAGNOSIS — Z95.2 HISTORY OF MITRAL VALVE REPLACEMENT WITH MECHANICAL VALVE: Primary | ICD-10-CM

## 2024-11-01 NOTE — PROGRESS NOTES
Received notification that pt checked his INR yesterday which was 2.2, and goal is 2.0 to 3.0    Pt denies any bruising or bleeding, changes in medication or diet.  Pt stated that he increased his INR from 10 mg to 15 mg last night.  Told pt his new Coumadin Regimen of 10 mg every Sunday, Tuesday, and Saturday, and 15 mg all the other days.  Told pt to check his INR in one week.  Pt verbally understands and has no further questions at this time.

## 2024-11-07 ENCOUNTER — ANTICOAGULATION VISIT (OUTPATIENT)
Dept: CARDIOLOGY | Facility: CLINIC | Age: 49
End: 2024-11-07
Payer: COMMERCIAL

## 2024-11-07 DIAGNOSIS — Z95.2 HISTORY OF MITRAL VALVE REPLACEMENT WITH MECHANICAL VALVE: Primary | ICD-10-CM

## 2024-11-07 LAB — INTERNATIONAL NORMALIZATION RATIO, POC: 2.8

## 2024-11-07 PROCEDURE — 85610 PROTHROMBIN TIME: CPT | Performed by: NURSE PRACTITIONER

## 2024-11-07 NOTE — PROGRESS NOTES
I called and left a  for patient letting him know to continue on same dose of coumadin for INR 2.8. I asked him to call me back with q/c.

## 2024-11-11 ENCOUNTER — OFFICE VISIT (OUTPATIENT)
Dept: CARDIOLOGY | Facility: CLINIC | Age: 49
End: 2024-11-11
Payer: COMMERCIAL

## 2024-11-11 VITALS
HEART RATE: 60 BPM | WEIGHT: 256 LBS | BODY MASS INDEX: 34.72 KG/M2 | DIASTOLIC BLOOD PRESSURE: 72 MMHG | OXYGEN SATURATION: 99 % | SYSTOLIC BLOOD PRESSURE: 118 MMHG

## 2024-11-11 DIAGNOSIS — Z95.2 HISTORY OF MITRAL VALVE REPLACEMENT WITH MECHANICAL VALVE: ICD-10-CM

## 2024-11-11 DIAGNOSIS — E78.2 MIXED HYPERLIPIDEMIA: ICD-10-CM

## 2024-11-11 DIAGNOSIS — R07.89 ATYPICAL CHEST PAIN: Primary | ICD-10-CM

## 2024-11-11 LAB
ATRIAL RATE: 60
P AXIS: 72
PR INTERVAL: 198
QRS DURATION: 86
QT INTERVAL: 472
QTC CALCULATION(BAZETT): 472
R AXIS: 1
T WAVE AXIS: 35
VENTRICULAR RATE: 60

## 2024-11-11 PROCEDURE — 99214 OFFICE O/P EST MOD 30 MIN: CPT | Performed by: NURSE PRACTITIONER

## 2024-11-11 PROCEDURE — 3008F BODY MASS INDEX DOCD: CPT | Performed by: NURSE PRACTITIONER

## 2024-11-11 ASSESSMENT — ENCOUNTER SYMPTOMS
DYSPNEA ON EXERTION: 0
NERVOUS/ANXIOUS: 0
DYSURIA: 0
NIGHT SWEATS: 0
WEIGHT GAIN: 0
HEADACHES: 0
DISTURBANCES IN COORDINATION: 0
HEARTBURN: 0
PALPITATIONS: 0
WHEEZING: 0
WEAKNESS: 0
SNORING: 0
PND: 0
SHORTNESS OF BREATH: 0
DIZZINESS: 0
COUGH: 0
BLURRED VISION: 0
NAUSEA: 0
NEAR-SYNCOPE: 0
SYNCOPE: 0
CLAUDICATION: 0
ORTHOPNEA: 0
IRREGULAR HEARTBEAT: 0
HEMATURIA: 0
ANOREXIA: 0
MYALGIAS: 0
ABDOMINAL PAIN: 0

## 2024-11-11 NOTE — ASSESSMENT & PLAN NOTE
Alfonzo has been in the ER twice now with midsternal chest discomfort.  It was felt to be due to reflux but he has had recent exertional chest discomfort.  He is on metoprolol succinate 25 mg daily and his BP is well controlled.  He will return to the office for an exercise nuclear stress test.

## 2024-11-11 NOTE — PROGRESS NOTES
HPI  Samuel Koehler presents to the office today for a follow-up on an ER visit.  He has a history of a MVR with a mechanical MV and is maintained on warfarin.  He last saw Dr. Lovett on 10/15/2024 and reported that he had been in the ER in July with chest pain that radiated to his back.  His work-up, including a CTA of the chest was negative and it was felt to be GI related.  He had been doing well but then again presented to  the ER on 10/24/2024 with intermittent central chest discomfort that radiated to his left arm and back.  He went for a walk and had exertional discomfort and presented to the ER.  He had two negative troponin levels and underwent a CTA of the chest which was negative for aortic dissection or aneurysm.  He was instructed to follow-up with Cardiology.  Today, Alfonzo reports that he occasionally gets chest discomfort but believes it is related to certain foods that he heats.  He has been taking omeprazole.  He denies SOB or BURNS; no orthopnea, PND or edema.  He has not been exercising routinely.         Medical History: has a past medical history of ADHD (10/04/2019), COVID, Lipid disorder, Mitral valvular regurgitation (2023), PFO (patent foramen ovale) (2023), and Pulmonary hypertension (CMS/HCC) (2023).    Surgical History: has a past surgical history that includes Liver surgery; Knee arthroscopy w/ meniscectomy (Right); Mitral valve replacement (Left, 2023); and Cataract extraction (Bilateral).    Social History:  Social History     Tobacco Use    Smoking status: Former     Current packs/day: 0.00     Average packs/day: 1 pack/day for 15.0 years (15.0 ttl pk-yrs)     Types: Cigarettes     Start date: 2004     Quit date: 2019     Years since quittin.1    Smokeless tobacco: Never   Substance Use Topics    Alcohol use: Not Currently    Drug use: No       Family History: He indicated that his biological mother is alive. He indicated that his biological  father is . He indicated that both of his biological sisters are alive. He indicated that his maternal grandmother is . He indicated that his maternal grandfather is . He indicated that his paternal grandmother is . He indicated that his paternal grandfather is . He indicated that his biological daughter is alive. He indicated that his biological son is alive.           Allergies:Penicillins    Current Medications:  Current Outpatient Medications   Medication Sig Dispense Refill    atorvastatin (LIPITOR) 40 mg tablet Take 1 tablet (40 mg total) by mouth daily. 90 tablet 3    clindamycin (CLEOCIN) 150 mg capsule TAKE 4 CAPSULES BY MOUTH 1 HOUR PRIOR TO DENTIST 4 capsule 6    metoprolol succinate XL (TOPROL-XL) 25 mg 24 hr tablet TAKE 1 TABLET(25 MG) BY MOUTH DAILY 90 tablet 3    omeprazole (PriLOSEC) 40 mg capsule Take 1 capsule (40 mg total) by mouth daily before breakfast. 30 capsule 1    warfarin (COUMADIN) 10 mg tablet Take 10 mg three times weekly, take 15 mg 4 times weekly OR as directed by office based on INR result. 270 tablet 3     No current facility-administered medications for this visit.            Review of Systems   Constitutional: Negative for malaise/fatigue, night sweats and weight gain.   HENT:  Negative for nosebleeds.    Eyes:  Negative for blurred vision.   Cardiovascular:  Positive for chest pain. Negative for claudication, cyanosis, dyspnea on exertion, irregular heartbeat, leg swelling, near-syncope, orthopnea, palpitations, paroxysmal nocturnal dyspnea and syncope.   Respiratory:  Negative for cough, shortness of breath, snoring and wheezing.    Skin:  Negative for rash.   Musculoskeletal:  Negative for myalgias.   Gastrointestinal:  Negative for abdominal pain, anorexia, dysphagia, heartburn and nausea.   Genitourinary:  Negative for dysuria, hematuria and nocturia.   Neurological:  Negative for disturbances in coordination, dizziness, headaches and  weakness.   Psychiatric/Behavioral:  The patient is not nervous/anxious.    Allergic/Immunologic: Negative for HIV exposure.     Vitals:    11/11/24 1454 11/11/24 1456   BP: 116/74 118/72   BP Location: Right upper arm Left upper arm   Patient Position: Sitting Sitting   Pulse: 60    SpO2: 99%    Weight: 116 kg (256 lb)      Physical Exam  Constitutional:       General: He is not in acute distress.     Appearance: Normal appearance. He is not ill-appearing or diaphoretic.   HENT:      Head: Normocephalic and atraumatic.   Eyes:      Conjunctiva/sclera: Conjunctivae normal.   Cardiovascular:      Rate and Rhythm: Normal rate and regular rhythm.      Pulses: Normal pulses.      Comments: Crisp valve sounds.  Pulmonary:      Effort: Pulmonary effort is normal. No respiratory distress.      Breath sounds: No wheezing, rhonchi or rales.   Abdominal:      Palpations: Abdomen is soft.   Musculoskeletal:         General: Normal range of motion.      Cervical back: Normal range of motion.      Right lower leg: No edema.      Left lower leg: No edema.   Skin:     General: Skin is warm and dry.   Neurological:      General: No focal deficit present.      Mental Status: He is alert and oriented to person, place, and time.   Psychiatric:         Mood and Affect: Mood normal.         Behavior: Behavior normal.         Thought Content: Thought content normal.         Judgment: Judgment normal.                ECG:  NSR, rate 60, cannot exclude septal infarct.    History of mitral valve replacement with mechanical valve  S/p mechanical MVR 03/2023 and is anticoagulated with warfarin.  His INR from 11/07/2024 was 2.8.    Atypical chest pain  Bill has been in the ER twice now with midsternal chest discomfort.  It was felt to be due to reflux but he has had recent exertional chest discomfort.  He is on metoprolol succinate 25 mg daily and his BP is well controlled.  He will return to the office for an exercise nuclear stress  test.    Mixed hyperlipidemia  Continue atorvastatin 40 mg daily.         Bill will return to the office for a nuclear stress test.  He has a follow-up appointment scheduled with Dr. Lovett.    I spent 30 minutes on this date of service performing the following activities: obtaining history, performing examination, entering orders, documenting, obtaining / reviewing records, and providing counseling and education.       NIMA Rubin   11/11/2024  4:05 PM

## 2024-11-18 ENCOUNTER — APPOINTMENT (OUTPATIENT)
Dept: CARDIOLOGY | Facility: CLINIC | Age: 49
Setting detail: NUCLEAR MEDICINE
End: 2024-11-18
Attending: NURSE PRACTITIONER
Payer: COMMERCIAL

## 2024-11-18 ENCOUNTER — HOSPITAL ENCOUNTER (OUTPATIENT)
Dept: CARDIOLOGY | Facility: CLINIC | Age: 49
Setting detail: NUCLEAR MEDICINE
Discharge: HOME | End: 2024-11-18
Attending: NURSE PRACTITIONER
Payer: COMMERCIAL

## 2024-11-18 VITALS — HEIGHT: 72 IN | WEIGHT: 256 LBS | BODY MASS INDEX: 34.67 KG/M2

## 2024-11-18 DIAGNOSIS — R07.89 ATYPICAL CHEST PAIN: ICD-10-CM

## 2024-11-18 PROCEDURE — A9502 TC99M TETROFOSMIN: HCPCS | Performed by: INTERNAL MEDICINE

## 2024-11-18 PROCEDURE — 78452 HT MUSCLE IMAGE SPECT MULT: CPT | Performed by: INTERNAL MEDICINE

## 2024-11-18 PROCEDURE — 93015 CV STRESS TEST SUPVJ I&R: CPT | Performed by: INTERNAL MEDICINE

## 2024-11-19 LAB
CV NM TETROFOSMIN REST DOSE: 9.6 MCI
CV NM TETROFOSMIN STRESS DOSE: 30.9 MCI
MLH CV NM REST ADMIN DATE: NORMAL MM/DD/YYYY
MLH CV NM REST ADMIN TIME: 816 HR:MIN
MLH CV NM STRESS ADMIN DATE: NORMAL MM/DD/YYYY
MLH CV NM STRESS ADMIN TIME: 926 HR:MIN
NUC STRESS EJECTION FRACTION: 64 %
STRESS BASELINE BP: NORMAL MMHG
STRESS BASELINE HR: 70 BPM
STRESS ECHO POST RECOVERY HR: 136 BPM
STRESS PERCENT HR: 89 %
STRESS POST ESTIMATED WORKLOAD: 8.5 METS
STRESS POST EXERCISE DUR MIN: 6 MIN
STRESS POST EXERCISE DUR SEC: 30 SEC
STRESS POST PEAK BP: NORMAL MMHG
STRESS POST PEAK HR: 150 BPM
STRESS TARGET HR: 144 BPM

## 2024-11-20 LAB — INR PPP: 2.4

## 2024-11-21 ENCOUNTER — ANTICOAGULATION VISIT (OUTPATIENT)
Dept: CARDIOLOGY | Facility: CLINIC | Age: 49
End: 2024-11-21
Payer: COMMERCIAL

## 2024-11-21 DIAGNOSIS — Z95.2 HISTORY OF MITRAL VALVE REPLACEMENT WITH MECHANICAL VALVE: Primary | ICD-10-CM

## 2024-11-21 NOTE — PROGRESS NOTES
Spoke with pt in regards to pt.'s INR results from yesterday, which was 2.4, and his goal is 2.5 to 3.5    Spoke with pt, which he denies any abnormal bruising/bleeding, or changes in medication or diet.  Told pt to continue with current medication regimen of 10 mg every Sunday, Tuesday, and Saturday, and take 15 mg all the other days.  Told pt to check his INR in two weeks.  Also reminded pt with the holidays coming up to be mind full of what he eats or drinks and to make sure he balances him self out if he eats or drinks something that would effect his INR.  Pt verbally understands and has no further questions at this time.

## 2024-11-25 DIAGNOSIS — Z95.2 HISTORY OF MITRAL VALVE REPLACEMENT WITH MECHANICAL VALVE: ICD-10-CM

## 2024-11-25 DIAGNOSIS — I27.20 PULMONARY HYPERTENSION (CMS/HCC): Primary | ICD-10-CM

## 2024-11-25 DIAGNOSIS — G47.34 NOCTURNAL HYPOXEMIA: ICD-10-CM

## 2024-11-25 DIAGNOSIS — G47.33 OSA (OBSTRUCTIVE SLEEP APNEA): ICD-10-CM

## 2024-12-09 ENCOUNTER — ANTICOAGULATION VISIT (OUTPATIENT)
Dept: CARDIOLOGY | Facility: CLINIC | Age: 49
End: 2024-12-09
Payer: COMMERCIAL

## 2024-12-09 DIAGNOSIS — Z95.2 HISTORY OF MITRAL VALVE REPLACEMENT WITH MECHANICAL VALVE: Primary | ICD-10-CM

## 2024-12-09 LAB — INR PPP: 2.4

## 2024-12-09 NOTE — PROGRESS NOTES
INR 2.4. PT 2.4 two and half weeks ago. He will increase to 15 mg 5 times weekly 10 mg on Sat, Sun. He will repeat next week. He will call with q/c.

## 2024-12-12 ENCOUNTER — HOSPITAL ENCOUNTER (OUTPATIENT)
Dept: SLEEP MEDICINE | Facility: HOSPITAL | Age: 49
Discharge: HOME | End: 2024-12-12
Attending: INTERNAL MEDICINE
Payer: COMMERCIAL

## 2024-12-12 PROCEDURE — 95811 POLYSOM 6/>YRS CPAP 4/> PARM: CPT

## 2024-12-26 ENCOUNTER — TELEPHONE (OUTPATIENT)
Dept: CARDIOLOGY | Facility: CLINIC | Age: 49
End: 2024-12-26
Payer: COMMERCIAL

## 2024-12-26 LAB — INR PPP: 5.2

## 2024-12-27 ENCOUNTER — ANTICOAGULATION VISIT (OUTPATIENT)
Dept: CARDIOLOGY | Facility: CLINIC | Age: 49
End: 2024-12-27
Payer: COMMERCIAL

## 2024-12-27 DIAGNOSIS — Z95.2 HISTORY OF MITRAL VALVE REPLACEMENT WITH MECHANICAL VALVE: Primary | ICD-10-CM

## 2024-12-27 NOTE — PROGRESS NOTES
Called and spoke with pt.  Pt stated that he was doing a lot of drinking of alcohol this past week.  Pt held his Coumadin yesterday, and will hold his Coumadin again tonight.  Pt will take 10 mg of Coumadin on Saturday and Sunday and recheck his INR on Monday 12/30/2024.  Pt verbally understands and has no further questions at this time.

## 2025-01-02 ENCOUNTER — ANTICOAGULATION VISIT (OUTPATIENT)
Dept: CARDIOLOGY | Facility: CLINIC | Age: 50
End: 2025-01-02
Payer: COMMERCIAL

## 2025-01-02 ENCOUNTER — TELEPHONE (OUTPATIENT)
Dept: PULMONOLOGY | Facility: CLINIC | Age: 50
End: 2025-01-02
Payer: COMMERCIAL

## 2025-01-02 DIAGNOSIS — Z95.2 HISTORY OF MITRAL VALVE REPLACEMENT WITH MECHANICAL VALVE: Primary | ICD-10-CM

## 2025-01-02 LAB — INR PPP: 1.8

## 2025-01-02 NOTE — PROGRESS NOTES
INR 1.8. Patient has not been drinking alcohol. PT will take 15 mg coumadin Monday through Friday and 10 mg Sat and Sun. He will repeat INR in one week to assess INR. He will call in the interim with q/c.

## 2025-01-09 ENCOUNTER — ANTICOAGULATION VISIT (OUTPATIENT)
Dept: CARDIOLOGY | Facility: CLINIC | Age: 50
End: 2025-01-09
Payer: COMMERCIAL

## 2025-01-09 DIAGNOSIS — Z95.2 HISTORY OF MITRAL VALVE REPLACEMENT WITH MECHANICAL VALVE: Primary | ICD-10-CM

## 2025-01-09 LAB — INR PPP: 3.2

## 2025-01-09 NOTE — PROGRESS NOTES
INR 3.2. I called and spoke with patient. I let him know that if he remains consistent with current diet/alcohol intake then he should remain on current dose of coumadin and repeat in 2 weeks.He will call with changes.

## 2025-01-15 LAB — INR PPP: 4.4

## 2025-01-16 ENCOUNTER — ANTICOAGULATION VISIT (OUTPATIENT)
Dept: CARDIOLOGY | Facility: CLINIC | Age: 50
End: 2025-01-16
Payer: COMMERCIAL

## 2025-01-16 DIAGNOSIS — Z95.2 HISTORY OF MITRAL VALVE REPLACEMENT WITH MECHANICAL VALVE: Primary | ICD-10-CM

## 2025-01-16 NOTE — PROGRESS NOTES
Received notification that pt checked his INR yesterday, which was 4.4, and pt.'s goal is 2.5 to 3.5    Pt denies any abnormal bleeing/bruising, or changes in medication.  Pt stated that he is eating more healthier now.  Patient held his dose yesterday.  Told pt to following new Coumadin dose of 10 mg every Sunday, Thursday and Saturday, and 15 mg all other days.  Told pt to check INR on 01/20/25.  Pt verbally understands and has no further questions at this time.

## 2025-01-20 LAB — INR PPP: 2.5

## 2025-01-21 ENCOUNTER — ANTICOAGULATION VISIT (OUTPATIENT)
Dept: CARDIOLOGY | Facility: CLINIC | Age: 50
End: 2025-01-21
Payer: COMMERCIAL

## 2025-01-21 DIAGNOSIS — Z95.2 HISTORY OF MITRAL VALVE REPLACEMENT WITH MECHANICAL VALVE: Primary | ICD-10-CM

## 2025-01-21 NOTE — PROGRESS NOTES
Received notification that pt checked his INR yesterday, which was 2.5, and pt.'s goal is 2.5 to 3.5    Spoke with pt who denies any abnormal bruising or bleeding.  Pt will continue to take 10 mg every Sunday, Thursday and Saturday and 15 mg all the other days.  Pt will recheck his INR in two weeks on 02/03/2025.  Pt verbally understands and has no further questions at this time.

## 2025-01-29 LAB — INR PPP: 4.6

## 2025-01-30 ENCOUNTER — ANTICOAGULATION VISIT (OUTPATIENT)
Dept: CARDIOLOGY | Facility: CLINIC | Age: 50
End: 2025-01-30
Payer: COMMERCIAL

## 2025-01-30 DIAGNOSIS — Z95.2 HISTORY OF MITRAL VALVE REPLACEMENT WITH MECHANICAL VALVE: Primary | ICD-10-CM

## 2025-01-30 NOTE — PROGRESS NOTES
Received notification that pt, checked his INR yesterday, which was 4.6, and pt.'s goal is 2.5 - 3.5    Pt denies any bruising/bleeding, or changes in medication.  Pt does state that he is trying to loss weight, which he is eating a high protein diet, which is most likely causing his INR to be on the higher side.  Told pt that we will adjust his Coumadin Regimen.  Pt held his Coumadin last night.  Pt will now take 15 mg on Monday, and Wednesday and 10 mg all the other days.  Pt will recheck his INR on Monday 02/03/2025.  Pt verbally understands and has no further questions at this time.

## 2025-02-03 LAB — INR PPP: 2.1

## 2025-02-04 ENCOUNTER — ANTICOAGULATION VISIT (OUTPATIENT)
Dept: CARDIOLOGY | Facility: CLINIC | Age: 50
End: 2025-02-04
Payer: COMMERCIAL

## 2025-02-04 DIAGNOSIS — Z95.2 HISTORY OF MITRAL VALVE REPLACEMENT WITH MECHANICAL VALVE: Primary | ICD-10-CM

## 2025-02-04 NOTE — PROGRESS NOTES
Received notification that pt, checked his INR yesterday which his goal is 2.5 to 3.5    Pt denies any abnormal bruising/bleeding, changes in medication or diet.  Pt states that he is still on his High Protein diet.  Told pt to take 15 mg every Monday/Wednesday/Friday, and 10 mg all the other days.  Told pt to check his INR again in 2 weeks.  Pt verbally understands and has no further questions at this time.

## 2025-02-05 ENCOUNTER — OFFICE VISIT (OUTPATIENT)
Dept: FAMILY MEDICINE | Facility: CLINIC | Age: 50
End: 2025-02-05
Payer: COMMERCIAL

## 2025-02-05 VITALS
SYSTOLIC BLOOD PRESSURE: 126 MMHG | WEIGHT: 250 LBS | TEMPERATURE: 97.2 F | HEIGHT: 72 IN | OXYGEN SATURATION: 96 % | DIASTOLIC BLOOD PRESSURE: 84 MMHG | BODY MASS INDEX: 33.86 KG/M2 | HEART RATE: 84 BPM

## 2025-02-05 DIAGNOSIS — N50.811 RIGHT TESTICULAR PAIN: Primary | ICD-10-CM

## 2025-02-05 DIAGNOSIS — Q21.12 PFO (PATENT FORAMEN OVALE): ICD-10-CM

## 2025-02-05 DIAGNOSIS — I27.20 PULMONARY HYPERTENSION (CMS/HCC): ICD-10-CM

## 2025-02-05 LAB
BACTERIA, POC: NEGATIVE
BILIRUBIN, POC: NEGATIVE
BLOOD URINE, POC: ABNORMAL
CLARITY, POC: CLEAR
COLOR, POC: YELLOW
EXPIRATION DATE: ABNORMAL
GLUCOSE URINE, POC: NEGATIVE
KETONES, POC: NEGATIVE
LEUKOCYTE EST, POC: NEGATIVE
Lab: ABNORMAL
NITRITE, POC: NEGATIVE
PH, POC: 5
POCT MANUFACTURER: ABNORMAL
PROTEIN, POC: ABNORMAL
SPECIFIC GRAVITY, POC: 1.02
UROBILINOGEN, POC: 0.2

## 2025-02-05 PROCEDURE — 3008F BODY MASS INDEX DOCD: CPT

## 2025-02-05 PROCEDURE — 81002 URINALYSIS NONAUTO W/O SCOPE: CPT

## 2025-02-05 PROCEDURE — 99213 OFFICE O/P EST LOW 20 MIN: CPT

## 2025-02-05 ASSESSMENT — ENCOUNTER SYMPTOMS
CHILLS: 0
VOMITING: 0
NAUSEA: 0
FREQUENCY: 0
MYALGIAS: 0
DIFFICULTY URINATING: 0
FEVER: 0
ABDOMINAL PAIN: 0
DYSURIA: 0

## 2025-02-05 NOTE — PROGRESS NOTES
[facility]      Reason for visit:   Chief Complaint   Patient presents with    sick visit      Samuel Koehler is a 49 y.o. male who presents for sick visit.    He reports right testicular pain. Started this morning.   Pain was 8/10 this morning, rates it now 5/10. Took tylenol.  Sitting feels ok, moving or touching area is more painful. Not visible swollen, but feels swollen.   Denies fever/chills, burning urination, frequency, penile discharge.   +SA, monogamous with wife.            Past Medical History:   Diagnosis Date    ADHD 10/04/2019    COVID         Lipid disorder     Mitral valvular regurgitation 2023    PFO (patent foramen ovale) 2023    Pulmonary hypertension (CMS/HCC) 2023       Past Surgical History   Procedure Laterality Date    Cataract extraction Bilateral         Knee arthroscopy w/ meniscectomy Right     Liver surgery      Mitral valve replacement Left 2023    left atrial appendage clip placement        Social History     Tobacco Use    Smoking status: Former     Current packs/day: 0.00     Average packs/day: 1 pack/day for 15.0 years (15.0 ttl pk-yrs)     Types: Cigarettes     Start date: 2004     Quit date: 2019     Years since quittin.3    Smokeless tobacco: Never   Substance Use Topics    Alcohol use: Not Currently    Drug use: No       Family History   Problem Relation Name Age of Onset    Heart disease Biological Father      Diabetes Biological Father      Heart disease Paternal Grandmother         Penicillins    Medications Ordered Prior to Encounter[1]    Review of Systems   Constitutional:  Negative for chills and fever.   Gastrointestinal:  Negative for abdominal pain, nausea and vomiting.   Genitourinary:  Positive for scrotal swelling and testicular pain. Negative for difficulty urinating, dysuria and frequency.   Musculoskeletal:  Negative for myalgias.       Objective   Vitals:    25 1500   BP: 126/84   BP Location: Left  upper arm   Patient Position: Sitting   Pulse: 84   Temp: 36.2 °C (97.2 °F)   TempSrc: Temporal   SpO2: 96%   Weight: 113 kg (250 lb)   Height: 1.829 m (6')     Body mass index is 33.91 kg/m².    Physical Exam  Constitutional:       General: He is not in acute distress.     Appearance: Normal appearance. He is not ill-appearing.   Abdominal:      General: Bowel sounds are normal. There is no distension.      Palpations: Abdomen is soft.      Tenderness: There is no abdominal tenderness.   Genitourinary:     Penis: Normal.       Testes:         Right: Tenderness present. Mass or swelling not present. Right testis is descended.         Left: Mass not present. Left testis is descended.   Neurological:      Mental Status: He is alert.             Lab Results   Component Value Date    WBC 4.80 10/24/2024    HGB 14.0 10/24/2024    HCT 42.5 10/24/2024     10/24/2024    CHOL 131 07/06/2023    TRIG 65 07/06/2023    HDL 53 07/06/2023    ALT 40 10/24/2024    AST 81 (H) 10/24/2024     10/24/2024    K 4.9 10/24/2024     10/24/2024    CREATININE 1.1 10/24/2024    BUN 19 10/24/2024    CO2 23 10/24/2024    TSH 1.230 12/18/2015    PSA 0.33 10/05/2023    INR 2.1 02/03/2025    HGBA1C 5.4 12/18/2015           Assessment   Problem List Items Addressed This Visit       PFO (patent foramen ovale)     Follows with cardiology          Pulmonary hypertension (CMS/HCC)     Follows with cardiology           Other Visit Diagnoses       Right testicular pain    -  Primary  Began today    No swelling on exam, testicles equally descended  Some tenderness on palpation over anterior upper right scrotal area   Pain improved with tylenol, no pain when sitting but worse with movement or when touching area  Urine dip + trace protein, trace blood. Negative nitrites or leuks  UA micro, C&S sent   Continue treatment with tylenol prn, ice prn  Will get scrotal US  Strict ER precautions reviewed including worsening/severe pain, increased  swelling or redness, fever, vomiting, abdominal pain or other concern      Relevant Orders    POCT urinalysis dipstick (Completed)    Urinalysis with microscopic    Urine culture    ULTRASOUND SCROTUM                NIMA Fung  2/5/2025          [1]   Current Outpatient Medications on File Prior to Visit   Medication Sig Dispense Refill    atorvastatin (LIPITOR) 40 mg tablet Take 1 tablet (40 mg total) by mouth daily. 90 tablet 3    clindamycin (CLEOCIN) 150 mg capsule TAKE 4 CAPSULES BY MOUTH 1 HOUR PRIOR TO DENTIST 4 capsule 6    metoprolol succinate XL (TOPROL-XL) 25 mg 24 hr tablet TAKE 1 TABLET(25 MG) BY MOUTH DAILY 90 tablet 3    omeprazole (PriLOSEC) 40 mg capsule Take 1 capsule (40 mg total) by mouth daily before breakfast. 30 capsule 1    warfarin (COUMADIN) 10 mg tablet Take 10 mg three times weekly, take 15 mg 4 times weekly OR as directed by office based on INR result. 270 tablet 3     No current facility-administered medications on file prior to visit.

## 2025-02-05 NOTE — PATIENT INSTRUCTIONS
Tylenol as needed  Ice as needed    Urology   Dr. Elio Sullivan ([place])  [phone]    If you develop severe pain, increased swelling or redness, nausea, vomiting, abdominal pain, fever - go to ER

## 2025-02-06 LAB
APPEARANCE UR: CLEAR
BACTERIA #/AREA URNS HPF: ABNORMAL /HPF
BACTERIA UR CULT: NORMAL
BILIRUB UR QL STRIP: NEGATIVE
COLOR UR: YELLOW
GLUCOSE UR QL STRIP: NEGATIVE
HGB UR QL STRIP: NEGATIVE
HYALINE CASTS #/AREA URNS LPF: ABNORMAL /LPF
KETONES UR QL STRIP: ABNORMAL
LEUKOCYTE ESTERASE UR QL STRIP: NEGATIVE
NITRITE UR QL STRIP: NEGATIVE
PH UR STRIP: ABNORMAL [PH] (ref 5–8)
PROT UR QL STRIP: NEGATIVE
RBC #/AREA URNS HPF: ABNORMAL /HPF
SERVICE CMNT-IMP: ABNORMAL
SP GR UR STRIP: 1.03 (ref 1–1.03)
SQUAMOUS #/AREA URNS HPF: ABNORMAL /HPF
WBC #/AREA URNS HPF: ABNORMAL /HPF

## 2025-02-12 DIAGNOSIS — R10.13 EPIGASTRIC ABDOMINAL PAIN: ICD-10-CM

## 2025-02-12 DIAGNOSIS — Z95.2 HISTORY OF MITRAL VALVE REPLACEMENT WITH MECHANICAL VALVE: ICD-10-CM

## 2025-02-12 RX ORDER — OMEPRAZOLE 40 MG/1
CAPSULE, DELAYED RELEASE ORAL
Qty: 30 CAPSULE | Refills: 1 | Status: SHIPPED | OUTPATIENT
Start: 2025-02-12

## 2025-02-12 RX ORDER — WARFARIN 10 MG/1
TABLET ORAL
Qty: 270 TABLET | Refills: 3 | Status: SHIPPED | OUTPATIENT
Start: 2025-02-12

## 2025-02-19 LAB — INR PPP: 2.4

## 2025-02-20 ENCOUNTER — ANTICOAGULATION VISIT (OUTPATIENT)
Dept: CARDIOLOGY | Facility: CLINIC | Age: 50
End: 2025-02-20
Payer: COMMERCIAL

## 2025-02-20 DIAGNOSIS — Z95.2 HISTORY OF MITRAL VALVE REPLACEMENT WITH MECHANICAL VALVE: Primary | ICD-10-CM

## 2025-02-20 NOTE — PROGRESS NOTES
INR 2.4. Pt will increase to 15 mg on Thursdays and repeat INR in two weeks. He verbalized understanding and will call in the interim with q/c.

## 2025-03-04 ENCOUNTER — TELEPHONE (OUTPATIENT)
Dept: SCHEDULING | Facility: CLINIC | Age: 50
End: 2025-03-04

## 2025-03-04 NOTE — TELEPHONE ENCOUNTER
Pt would like to reschedule his six month follow up (currently in May.) He is going on a cruise and would like peace of mind before he leaves. He is requesting an appt in early April. Please reach pt at [phone]

## 2025-03-05 LAB — INR PPP: 2.7

## 2025-03-06 ENCOUNTER — ANTICOAGULATION VISIT (OUTPATIENT)
Dept: CARDIOLOGY | Facility: CLINIC | Age: 50
End: 2025-03-06
Payer: COMMERCIAL

## 2025-03-06 DIAGNOSIS — Z95.2 HISTORY OF MITRAL VALVE REPLACEMENT WITH MECHANICAL VALVE: Primary | ICD-10-CM

## 2025-03-19 LAB — INR PPP: 4

## 2025-03-20 ENCOUNTER — ANTICOAGULATION VISIT (OUTPATIENT)
Dept: CARDIOLOGY | Facility: CLINIC | Age: 50
End: 2025-03-20
Payer: COMMERCIAL

## 2025-03-20 ENCOUNTER — OFFICE VISIT (OUTPATIENT)
Dept: PULMONOLOGY | Facility: CLINIC | Age: 50
End: 2025-03-20
Payer: COMMERCIAL

## 2025-03-20 VITALS
WEIGHT: 240 LBS | HEART RATE: 75 BPM | BODY MASS INDEX: 32.51 KG/M2 | DIASTOLIC BLOOD PRESSURE: 76 MMHG | OXYGEN SATURATION: 96 % | HEIGHT: 72 IN | RESPIRATION RATE: 16 BRPM | SYSTOLIC BLOOD PRESSURE: 120 MMHG

## 2025-03-20 DIAGNOSIS — Z95.2 HISTORY OF MITRAL VALVE REPLACEMENT WITH MECHANICAL VALVE: Primary | ICD-10-CM

## 2025-03-20 DIAGNOSIS — I34.0 NONRHEUMATIC MITRAL VALVE REGURGITATION: ICD-10-CM

## 2025-03-20 DIAGNOSIS — R91.1 LUNG NODULE: ICD-10-CM

## 2025-03-20 DIAGNOSIS — Z95.2 HISTORY OF MITRAL VALVE REPLACEMENT WITH MECHANICAL VALVE: ICD-10-CM

## 2025-03-20 DIAGNOSIS — R07.89 ATYPICAL CHEST PAIN: ICD-10-CM

## 2025-03-20 DIAGNOSIS — F17.201 TOBACCO ABUSE, IN REMISSION: ICD-10-CM

## 2025-03-20 DIAGNOSIS — G47.33 OSA (OBSTRUCTIVE SLEEP APNEA): ICD-10-CM

## 2025-03-20 DIAGNOSIS — I27.20 PULMONARY HYPERTENSION (CMS/HCC): Primary | ICD-10-CM

## 2025-03-20 DIAGNOSIS — G47.34 NOCTURNAL HYPOXEMIA: ICD-10-CM

## 2025-03-20 PROCEDURE — 3008F BODY MASS INDEX DOCD: CPT | Performed by: INTERNAL MEDICINE

## 2025-03-20 PROCEDURE — 94375 RESPIRATORY FLOW VOLUME LOOP: CPT | Performed by: INTERNAL MEDICINE

## 2025-03-20 PROCEDURE — 99215 OFFICE O/P EST HI 40 MIN: CPT | Mod: 25 | Performed by: INTERNAL MEDICINE

## 2025-03-20 NOTE — PROGRESS NOTES
Pulmonary Evaluation         Dear Dr. Holland, Suresh EDWARD MD,      Thank you for the opportunity to reevaluate your patient Samuel Koehler a 49 y.o.  male seen today in the office for pulmonary and sleep follow-up.    History of Present Illness:  Alfonzo returns to the office with excellent compliance on auto titrating CPAP severe obstructive sleep apnea with nocturnal hypoxemia and pulmonary hypertension.  The application of APAP is sufficient to eliminate apnea and hypoxemia and restore sleep to virtually normal architecture.  With excellent compliance he has had no further snoring, nocturnal respiratory awakening, morning headaches or daytime sleepiness.  He has had no leg jerks, sleepwalking, night terrors, hypnagogic or hypnopompic hallucinations, cataplexy or sleep paralysis.  He is most comfortable with a ResMed AirFit P10 nasal interface but does have some tolerable leak improved since shaving his conte.  He understands appropriate cleaning and replacement supplies as needed.    He has had no significant coughing, wheezing, chest tightness or dyspnea.  His upper airway has been clear.  He did have episodic chest/epigastric pain which is improved with the administration of omeprazole.  This is thought to be exacerbated by large hiatal hernia.  He is undergoing extensive GI workup in the near future.  He denies leg pain or swelling, palpitations, dizziness or syncope.  He denies fever, chills, recent weight change or constitutional symptoms.     Again, he has had no tobacco use since February 2014.  He has no alcohol or substance abuse.  He has eliminated his caffeine.  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  Environmental survey is negative.      Past Medical History:   Diagnosis Date    ADHD 10/04/2019    COVID     2023    Lipid disorder     Mitral valvular regurgitation 04/25/2023    PFO (patent foramen ovale) 04/25/2023    Pulmonary hypertension (CMS/HCC) 04/25/2023     Past Surgical  History   Procedure Laterality Date    Cataract extraction Bilateral         Knee arthroscopy w/ meniscectomy Right     Liver surgery      Mitral valve replacement Left 2023    left atrial appendage clip placement      Allergies   Allergen Reactions    Penicillins      Other reaction(s): Hives     Social History     Socioeconomic History    Marital status:      Spouse name: None    Number of children: None    Years of education: None    Highest education level: None   Tobacco Use    Smoking status: Former     Current packs/day: 0.00     Average packs/day: 1 pack/day for 15.0 years (15.0 ttl pk-yrs)     Types: Cigarettes     Start date: 2004     Quit date: 2019     Years since quittin.4    Smokeless tobacco: Never   Substance and Sexual Activity    Alcohol use: Not Currently    Drug use: No    Sexual activity: Yes     Partners: Female   Social History Narrative    Do you wear your seatbelt? Yes    Do you have smoke detector in your home? Yes    Do you have a carbon monoxide detector in your home? 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      Family History   Problem Relation Name Age of Onset    Heart disease Biological Father      Diabetes Biological Father      Heart disease Paternal Grandmother         Current Outpatient Medications:     atorvastatin (LIPITOR) 40 mg tablet, Take 1 tablet (40 mg total) by mouth daily., Disp: 90 tablet, Rfl: 3    clindamycin (CLEOCIN) 150 mg capsule, TAKE 4 CAPSULES BY MOUTH 1 HOUR PRIOR TO DENTIST (Patient taking differently: Take 150 mg by mouth.), Disp: 4 capsule, Rfl: 6    metoprolol succinate XL (TOPROL-XL) 25 mg 24 hr tablet, TAKE 1 TABLET(25 MG) BY MOUTH DAILY, Disp: 90 tablet, Rfl: 3    omeprazole (PriLOSEC) 40 mg capsule, TAKE 1 CAPSULE(40 MG) BY MOUTH DAILY BEFORE BREAKFAST, Disp: 30 capsule, Rfl: 1    warfarin (COUMADIN) 10 mg tablet, TAKE 10 MG DAILY THREE TIMES WEEKLY, AND 15 MG DAILY 4  TIMES WEEKLY OR AS DIRECTED BY OFFICE BASED ON INR, Disp: 270 tablet, Rfl: 3  Immunization History   Administered Date(s) Administered    Influenza Vaccine Quadrivalent Preservative Free 6 Mons and Up 10/04/2019, 09/15/2022, 10/05/2023    Influenza Vaccine Quadrivalent Preservative Free 6-35 Months 11/10/2014    Influenza Vaccine Trivalent Preservative Free 6 Mons And Up 10/08/2015, 10/28/2024    Influenza, Unspecified 11/10/2014    SARS-COV-2 (COVID-19) VACCINE, MODERNA MONOVALENT 02/06/2021, 03/05/2021    Td, Not Adsorbed 01/14/2014    Tdap 12/18/2015, 02/01/2024    Zoster Vaccine Recombinant Adjuvanted (Shingrix) 10/05/2023, 03/12/2024     Immunization status: up to date and documented.      Review of Systems:  A Full 14  point review of systems was obtained and is negative or otherwise stated in the HPI.      Physical Examination:  This is a well-developed well-nourished 49 y.o.year old male in no acute distress.  Vital Signs: Visit Vitals  /76   Pulse 75   Resp 16   Ht 1.829 m (6')   Wt 109 kg (240 lb)   SpO2 96%   BMI 32.55 kg/m²     HEENT:  Normocephalic, atraumatic.  PERRLA. There is no nasal mucosal inflammation or pharyngitis.  There is no evidence of thrush.  Crowding of the posterior oropharynx.  Eyes:  Sclera anicteric, conjunctiva pink, pupils equal and reactive to light  Neck: no adenopathy, no JVD  Chest:  Lungs clear to auscultation and percussion bilaterally, no crackles, rubs, rhonchi or wheezing.  Cor:  RR, normal S1 and S2, no murmurs, gallops or rubs. There is no accentuated P2 or right ventricular heave.  Abd: Soft Nontender, nondistended, normal bowel sounds.  There is no hepatosplenomegaly.  Extremities: no clubbing, cyanosis or edema.  Skin: no rash, jaundice or petechiae.  The skin is otherwise intact.  Neuro: alert and oriented with no focal deficits.  Lymph nodes:There is no palpable peripheral  lymphadenopathy.  Joints: no deformities, no warmth or erythema.  Psych: normal affect,  good eye contact.      Diagnostic Data:  I have independently reviewed the physiologic sleep data including efficacy and compliance from a ResMed S10 AutoSet 5-20 cm H2O with EPR level 3, full-time and standard response.  He achieves an average of 7 hours 10 minutes of use per night.  Maximum and 95th percentile pressures are below the maximum set pressure.  At median pressure of 10,9 cm H2O there is negligible leak.  These pressures are sufficient to markedly reduce AHI to 5.7 events per hour.  There are no significant central apneic episodes, Cheyne-Martinez respirations or other pathologic sleep.  Approximately 20 minutes time was spent addressing this.       Labs:  I have personally reviewed all pertinent patient laboratory results.  ABG Results    No lab values to display.       CBC Results         10/24/24 10/05/23 12/18/15     1323 1333     WBC 4.80 4.9 4.6    RBC 4.70 4.50 4.63    HGB 14.0 13.8 13.6    HCT 42.5 40.8 42.6    MCV 90.4 90.7 92    MCH 29.8 30.7 29.4    MCHC 32.9 33.8 31.9     196 220          BMP Results         02/05/25 10/24/24 10/05/23     1539 1323 1333    NA -- 138 142    K -- 4.9 4.2    Cl -- 107 109    CO2 -- 23 23    Glucose NEGATIVE 93 88    BUN -- 19 22    Creatinine -- 1.1 0.84    Calcium -- 9.2 9.2    Anion Gap -- 8 --    EGFR -- >60.0 106           Comment for NA at 1323 on 10/24/24: Moderate hemolysis, results may be affected.     Comment for K at 1323 on 10/24/24: Results obtained on plasma. Plasma Potassium values may be up to 0.4 mEQ/L less than serum values. The differences may be greater for patients with high platelet or white cell counts.  Moderate hemolysis, results may be affected.     Comment for Glucose at 1333 on 10/05/23:               Fasting reference interval         Comment for EGFR at 1323 on 10/24/24: Calculation based on the Chronic Kidney Disease Epidemiology Collaboration (CKD-EPI) equation refit without adjustment for race.          Pulmonary function  testing:  Spirometric data performed today is good technical study.  Spirograms reach plateau.  Forced vital capacity 4.52 L (85% predicted).  FEV1 3.69 L (89% predicted).  FEV1 percent 81%.  FEF 25 to 75%, 104% predicted.  Flow volume loops are normal.  Impression: This is a normal study.  There has been no significant interval change.    Imaging:  I have independently reviewed all pertinent imaging.      Assessment:   #1.  Severe obstructive sleep apnea with nocturnal hypoxemia with risk factors that include crowding of the posterior oropharynx and some increased nuchal and truncal body mass.  The application of CPAP is sufficient to markedly improve apnea, eliminate hypoxemia and restore sleep to virtually normal architecture.     Now with excellent compliance with auto titrating CPAP he has had resolution of symptoms with no new problems or complications.  His pulmonary hypertension is clinically stable.       #2.  Pulmonary hypertension; mild consistent with WHO group 2 with preserved right ventricular systolic pressure and recent RVSP approximately 28 mmHg.    #3.  Lung nodule, subcentimeter, solid 3 mm found along the left major fissure on recent CT angio performed for chest pain.  No other significant pathology was noted and no other nodules are seen.  In view of his history of tobacco use and after long discussion we will repeat an unenhanced, low-dose CT scan of the chest in 1 year from the previous study.      Plan:  #1.  Continue optimal compliance with auto titrating CPAP trying to achieve between 7 and 9 hours of use per night.  #2.  He will continue with a ResMed AirFit P10 nasal interface.  #3.  Continue optimizing sleep hygiene, maintaining optimal body mass index with some judicious weight loss, avoiding sleeping supine, limiting alcohol and sedatives, limiting caffeine to before noon and absolutely not operating machinery especially a motor vehicle if feeling fatigued.  #4.  Unenhanced, low-dose  CT scan chest has been ordered pursuant to discussion above.  #5.  Continue with appropriate immunizations.  #6.  Otherwise continue the present plan from my perspective.      I have discussed my findings, concerns and recommendations at length with Bill with shared decision making.  All questions were asked and answered. Evaluation of supplies and equipment were performed.  Education and instructions have been provided.  Follow-up has been arranged.  I will keep you apprised of my findings.  Please let me know if you have any questions.      Many thanks. Best regards.    Sincerely,    Surinder Garcia MD    3/20/2025    This letter was generated using speech recognition software.  Please excuse any typographical errors.

## 2025-03-20 NOTE — PROGRESS NOTES
Received notification that pt checked his INR yesterday, which was 4.0.    Spoke with pt who stated he denies any bruising/bleeding, or changes in medication.  Pt stated that he at a grapefruit on Tuesday.  Made pt aware that a grapefruit can increase his INR.  Pt stated that he held his Coumadin yesterday.  Pt will continue with taking 10 mg every Tuesday, Thursday and Saturday, and 15 mg all the other days.  Asked pt to check his INR in one week on 03/25/25.  Pt verbally understands and has no further questions at this time.

## 2025-03-27 ENCOUNTER — ANTICOAGULATION VISIT (OUTPATIENT)
Dept: CARDIOLOGY | Facility: CLINIC | Age: 50
End: 2025-03-27
Payer: COMMERCIAL

## 2025-03-27 DIAGNOSIS — Z95.2 HISTORY OF MITRAL VALVE REPLACEMENT WITH MECHANICAL VALVE: Primary | ICD-10-CM

## 2025-03-27 LAB — INR PPP: 3.2

## 2025-03-27 NOTE — PROGRESS NOTES
INR 3.2. Pt will remain on current dose of coumadin and repeat INR in one week.    [Restricted in physically strenuous activity but ambulatory and able to carry out work of a light or sedentary nature] : Status 1- Restricted in physically strenuous activity but ambulatory and able to carry out work of a light or sedentary nature, e.g., light house work, office work [Normal] : affect appropriate [de-identified] : epigastric tenderness [de-identified] : destructive pigmented lesion on the right thumb that completely replaces the nail; right axilla node ~ 2 cm.

## 2025-03-31 SDOH — ECONOMIC STABILITY: FOOD INSECURITY: WITHIN THE PAST 12 MONTHS, YOU WORRIED THAT YOUR FOOD WOULD RUN OUT BEFORE YOU GOT MONEY TO BUY MORE.: NEVER TRUE

## 2025-03-31 SDOH — ECONOMIC STABILITY: FOOD INSECURITY: WITHIN THE PAST 12 MONTHS, THE FOOD YOU BOUGHT JUST DIDN'T LAST AND YOU DIDN'T HAVE MONEY TO GET MORE.: NEVER TRUE

## 2025-03-31 SDOH — ECONOMIC STABILITY: INCOME INSECURITY: IN THE LAST 12 MONTHS, WAS THERE A TIME WHEN YOU WERE NOT ABLE TO PAY THE MORTGAGE OR RENT ON TIME?: NO

## 2025-03-31 SDOH — ECONOMIC STABILITY: TRANSPORTATION INSECURITY
IN THE PAST 12 MONTHS, HAS THE LACK OF TRANSPORTATION KEPT YOU FROM MEDICAL APPOINTMENTS OR FROM GETTING MEDICATIONS?: NO

## 2025-03-31 SDOH — ECONOMIC STABILITY: TRANSPORTATION INSECURITY
IN THE PAST 12 MONTHS, HAS LACK OF TRANSPORTATION KEPT YOU FROM MEETINGS, WORK, OR FROM GETTING THINGS NEEDED FOR DAILY LIVING?: NO

## 2025-03-31 ASSESSMENT — SOCIAL DETERMINANTS OF HEALTH (SDOH): IN THE PAST 12 MONTHS, HAS THE ELECTRIC, GAS, OIL, OR WATER COMPANY THREATENED TO SHUT OFF SERVICE IN YOUR HOME?: NO

## 2025-04-03 ENCOUNTER — ANTICOAGULATION VISIT (OUTPATIENT)
Dept: CARDIOLOGY | Facility: CLINIC | Age: 50
End: 2025-04-03
Payer: COMMERCIAL

## 2025-04-03 DIAGNOSIS — Z95.2 HISTORY OF MITRAL VALVE REPLACEMENT WITH MECHANICAL VALVE: Primary | ICD-10-CM

## 2025-04-03 LAB — INR PPP: 3.5

## 2025-04-03 NOTE — PROGRESS NOTES
Received notification that pt, checked his INR today which was 3.5, and pt.'s INR goal is 2.5 to 3.5    Spoke with pt, who denies any abnormal bruising/bleeding, changes in medication, or diet.  Pt will continue to take 10 mg every Tuesday, Thursday, and Saturday, and 15 mg all the other days. Asked pt to recheck his INR in two weeks on 04/17/2025.  Pt verbally understands and has no further questions at this time.

## 2025-04-06 DIAGNOSIS — R10.13 EPIGASTRIC ABDOMINAL PAIN: ICD-10-CM

## 2025-04-07 ENCOUNTER — OFFICE VISIT (OUTPATIENT)
Dept: FAMILY MEDICINE | Facility: CLINIC | Age: 50
End: 2025-04-07
Payer: COMMERCIAL

## 2025-04-07 VITALS
OXYGEN SATURATION: 96 % | HEIGHT: 72 IN | BODY MASS INDEX: 31.69 KG/M2 | SYSTOLIC BLOOD PRESSURE: 120 MMHG | DIASTOLIC BLOOD PRESSURE: 74 MMHG | TEMPERATURE: 97.1 F | HEART RATE: 82 BPM | WEIGHT: 234 LBS

## 2025-04-07 DIAGNOSIS — Z12.5 SCREENING FOR PROSTATE CANCER: ICD-10-CM

## 2025-04-07 DIAGNOSIS — Z00.00 ENCOUNTER FOR GENERAL ADULT MEDICAL EXAMINATION WITHOUT ABNORMAL FINDINGS: Primary | ICD-10-CM

## 2025-04-07 PROCEDURE — 99396 PREV VISIT EST AGE 40-64: CPT | Performed by: FAMILY MEDICINE

## 2025-04-07 PROCEDURE — 3008F BODY MASS INDEX DOCD: CPT | Performed by: FAMILY MEDICINE

## 2025-04-07 RX ORDER — OMEPRAZOLE 40 MG/1
CAPSULE, DELAYED RELEASE ORAL
Qty: 90 CAPSULE | Refills: 1 | Status: SHIPPED | OUTPATIENT
Start: 2025-04-07

## 2025-04-07 ASSESSMENT — PATIENT HEALTH QUESTIONNAIRE - PHQ9: SUM OF ALL RESPONSES TO PHQ9 QUESTIONS 1 & 2: 0

## 2025-04-07 ASSESSMENT — ENCOUNTER SYMPTOMS
BLOOD IN STOOL: 0
NAUSEA: 0
DIZZINESS: 0
ARTHRALGIAS: 0
BACK PAIN: 0
ABDOMINAL PAIN: 0
SHORTNESS OF BREATH: 0
RHINORRHEA: 0
CONSTIPATION: 0
FEVER: 0
DYSURIA: 0
NUMBNESS: 0
WEAKNESS: 0
ADENOPATHY: 0
FREQUENCY: 0
DIARRHEA: 0
PALPITATIONS: 0
COUGH: 0
VOMITING: 0
SORE THROAT: 0
CHILLS: 0

## 2025-04-07 NOTE — PATIENT INSTRUCTIONS
If you were given a lab slip today for labs, please try to get this done in the next few weeks unless otherwise told differently.    Diet - Try to limit portion sizes, take out, fast food, processed foods. Alcohol can be a expensive source of calories! If these are current problems for you, pick one area and focus on that first! There is no such thing as a perfect diet. If you are trying to lose weight, it will be difficult to do with foods you do not enjoy.  You may need to try a few different things before finding something.  In general, the diet with the best evidence is the Mediterranean diet. If you have high blood pressure, the DASH diet has good evidence to lower weight and BP. If you have Diabetes or Pre-Diabetes, limiting carbohydrate and sugar intake is essential.  Rice, breads, pasta, sodas are commonly forgotten about that could improve your numbers if you limit your intake. Sodas and juices are empty calories and full of sugar.    Exercise - Goal should be 30-40 minutes, 3-4 times a week. If you are currently not exercising, set reachable goals with short term deadlines!  While walking your dog is great it usually is not enough to lead to significant weight loss.  To achieve the weight loss, you need sustained elevated heart rates, preferably over 100 beats per minute.  Weights - even 1-2 pounds! - are great to strengthen bones in older adults.      Preventative Care Due - Colonoscopy next month    Labs Due Today - Yes    Shots Due Today - None    COVID Vaccine - New Booster was released in September 2024. My office usually has it in stock    Preventative Care Recommendations:  Colonoscopy at 45 unless first degree family history of colon cancer  Mammograms yearly starting at 40 unless family history of breast cancer  Prostate Screening at 50 unless first degree family history of prostate cancer  Pap smears are variable depending on social history, age, and past results - generally every 3-5  years    Vaccine Recommendations:  Shingrix - 2 shots 2-6 months apart at 50  Tdap - every 10 years  Hepatitis A - recommended for international travelers, food industry workers  Flu/COVID - yearly - I recommend getting this by Judi each year    Follow up - 1 year or sooner if anything comes up. We keep same-day sick appointments available every day for last minute problems.  If it is during the week - call us or send a portal message! Often times we can prevent an ER or UC visit! For certain problems, a telemedicine visit can be a great way to see me without having to come into the office or go to an UC!    If you have a chronic medical problem such as Hypertension, Diabetes, Heart Disease or have multiple chronic problems, it is likely I want to see you every 6 months.    If you have any specialists such as a Cardiologist, Gastroenterologist for a chronic problem, make sure you are following up with them as recommended!  Diabetics should be having a yearly eye exam by a Optometrist/Ophthalmologist and a foot exam by a Podiatrist!  Women of reproductive age should see their GYN yearly - though you may not need a pap smear done at each visit.  Those with a family history of skin cancer should see a Dermatologist annually.

## 2025-04-07 NOTE — PROGRESS NOTES
[place] [address]  [phone]       Reason for visit:   Chief Complaint   Patient presents with    Annual Exam      HPI   Samuel Koehler is a 49 y.o. male who presents for his CPX   Medical Updates Yes Down 22 lb in 6 months, most of it over the last few months.      Personal Updates No     Diet - Healthy - low fat, high protein  Exercise - Weights, Walking - 3-4/wk    Smoke No   Alcohol Yes Rare  Drugs No     Lives with Wife, Son  Work [employer]  Colonoscopy Due Yes Scheduled for   CT Lung Due No     Hep A Due Yes   TDAP Due No   Flu Due No   Shingles Due No   COVID Vaccination Due Yes   Lipids Due Yes   Hep C Due Yes     Advanced Directives Complete No     Specialists: GI, Cardiology   Past Medical History:   Diagnosis Date    ADHD 10/04/2019    COVID         Lipid disorder     Mitral valvular regurgitation 2023    Mixed hyperlipidemia 2023    Lipid profile.  2023  Total cholesterol 131.  Triglycerides 65.  HDL 53  LDL 64      YAIR (obstructive sleep apnea) 2024    PFO (patent foramen ovale) 2023    Pulmonary hypertension (CMS/HCC) 2023     Past Surgical History   Procedure Laterality Date    Cataract extraction Bilateral         Knee arthroscopy w/ meniscectomy Right     Liver surgery      Mitral valve replacement Left 2023    left atrial appendage clip placement      Social History     Socioeconomic History    Marital status:      Spouse name: Not on file    Number of children: Not on file    Years of education: Not on file    Highest education level: Not on file   Occupational History    Not on file   Tobacco Use    Smoking status: Former     Current packs/day: 0.00     Average packs/day: 1 pack/day for 15.0 years (15.0 ttl pk-yrs)     Types: Cigarettes     Start date: 2004     Quit date: 2019     Years since quittin.5    Smokeless  tobacco: Never   Substance and Sexual Activity    Alcohol use: Yes    Drug use: No    Sexual activity: Yes     Partners: Female   Other Topics Concern    Not on file   Social History Narrative    Do you wear your seatbelt? Yes    Do you have smoke detector in your home? Yes    Do you have a carbon monoxide detector in your home? 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      Social Drivers of Health     Financial Resource Strain: Not on file   Food Insecurity: No Food Insecurity (3/31/2025)    Hunger Vital Sign     Worried About Running Out of Food in the Last Year: Never true     Ran Out of Food in the Last Year: Never true   Transportation Needs: No Transportation Needs (3/31/2025)    PRAPARE - Transportation     Lack of Transportation (Medical): No     Lack of Transportation (Non-Medical): No   Physical Activity: Not on file   Stress: Not on file   Social Connections: Not on file   Intimate Partner Violence: Not on file   Housing Stability: Unknown (3/31/2025)    Housing Stability Vital Sign     Unable to Pay for Housing in the Last Year: No     Number of Times Moved in the Last Year: Not on file     Homeless in the Last Year: No     Family History   Problem Relation Name Age of Onset    Heart disease Biological Father      Diabetes Biological Father      Heart disease Paternal Grandmother       Penicillins  Current Outpatient Medications   Medication Sig Dispense Refill    atorvastatin (LIPITOR) 40 mg tablet Take 1 tablet (40 mg total) by mouth daily. 90 tablet 3    clindamycin (CLEOCIN) 150 mg capsule TAKE 4 CAPSULES BY MOUTH 1 HOUR PRIOR TO DENTIST (Patient taking differently: Take 150 mg by mouth.) 4 capsule 6    metoprolol succinate XL (TOPROL-XL) 25 mg 24 hr tablet TAKE 1 TABLET(25 MG) BY MOUTH DAILY 90 tablet 3    omeprazole (PriLOSEC) 40 mg capsule TAKE 1 CAPSULE(40 MG) BY MOUTH DAILY BEFORE BREAKFAST 30 capsule 1    warfarin (COUMADIN) 10 mg tablet TAKE 10 MG  DAILY THREE TIMES WEEKLY, AND 15 MG DAILY 4 TIMES WEEKLY OR AS DIRECTED BY OFFICE BASED ON  tablet 3     No current facility-administered medications for this visit.       Review of Systems   Constitutional:  Negative for chills and fever.   HENT:  Negative for congestion, hearing loss, rhinorrhea and sore throat.    Eyes:  Negative for visual disturbance.   Respiratory:  Negative for cough and shortness of breath.    Cardiovascular:  Negative for chest pain and palpitations.   Gastrointestinal:  Negative for abdominal pain, blood in stool, constipation, diarrhea, nausea and vomiting.   Genitourinary:  Negative for dysuria, frequency and testicular pain.   Musculoskeletal:  Negative for arthralgias and back pain.   Skin:  Negative for rash.   Allergic/Immunologic: Negative for environmental allergies and food allergies.   Neurological:  Negative for dizziness, weakness and numbness.   Hematological:  Negative for adenopathy.     Objective   Vitals:    04/07/25 0757   BP: 120/74   BP Location: Left upper arm   Patient Position: Sitting   Pulse: 82   Temp: 36.2 °C (97.1 °F)   TempSrc: Temporal   SpO2: 96%   Weight: 106 kg (234 lb)   Height: 1.829 m (6')       Physical Exam  Vitals and nursing note reviewed.   Constitutional:       Appearance: Normal appearance. He is well-developed.   HENT:      Head: Normocephalic and atraumatic.      Right Ear: Tympanic membrane and ear canal normal.      Left Ear: Tympanic membrane and ear canal normal.      Nose: Nose normal.   Eyes:      Conjunctiva/sclera: Conjunctivae normal.      Pupils: Pupils are equal, round, and reactive to light.   Neck:      Thyroid: No thyroid mass or thyromegaly.   Cardiovascular:      Rate and Rhythm: Normal rate and regular rhythm.      Pulses: Normal pulses.           Radial pulses are 2+ on the right side and 2+ on the left side.      Heart sounds: S1 normal and S2 normal. No murmur heard.     No friction rub. No gallop.   Pulmonary:       Effort: Pulmonary effort is normal.      Breath sounds: Normal breath sounds. No wheezing, rhonchi or rales.   Abdominal:      General: Bowel sounds are normal.      Palpations: Abdomen is soft.      Tenderness: There is no abdominal tenderness. There is no guarding or rebound.   Musculoskeletal:         General: Normal range of motion.      Right lower leg: No edema.      Left lower leg: No edema.   Lymphadenopathy:      Cervical: No cervical adenopathy.   Neurological:      Mental Status: He is alert and oriented to person, place, and time.      Cranial Nerves: No cranial nerve deficit.   Psychiatric:         Speech: Speech normal.         Behavior: Behavior normal.         Judgment: Judgment normal.         Procedures    Health Maintenance   Topic Date Due    Colorectal Cancer Screening  Never done    HIV Screening  Never done    Hepatitis C Screening  Never done    Hepatitis B Vaccines (1 of 3 - 19+ 3-dose series) Never done    Pneumococcal (50 years of age and older) (1 of 1 - PCV) Never done    COVID-19 Vaccine (3 - 2024-25 season) 09/01/2024    Depression Screening  04/07/2026    DTaP, Tdap, and Td Vaccines (3 - Td or Tdap) 02/01/2034    RSV Vaccine (1 - 1-dose 75+ series) 05/20/2048    Zoster Vaccine  Completed    Influenza Vaccine  Completed    Meningococcal ACWY  Aged Out    RSV <20 months  Aged Out    HIB Vaccines  Aged Out    IPV Vaccines  Aged Out    Meningococcal B  Aged Out    HPV Vaccines  Aged Out       Lab Results   Component Value Date    WBC 4.80 10/24/2024    HGB 14.0 10/24/2024    HCT 42.5 10/24/2024     10/24/2024    CHOL 131 07/06/2023    TRIG 65 07/06/2023    HDL 53 07/06/2023    ALT 40 10/24/2024    AST 81 (H) 10/24/2024     10/24/2024    K 4.9 10/24/2024     10/24/2024    CREATININE 1.1 10/24/2024    BUN 19 10/24/2024    CO2 23 10/24/2024    TSH 1.230 12/18/2015    PSA 0.33 10/05/2023    INR 3.5 04/03/2025    HGBA1C 5.4 12/18/2015         Assessment   Problem List Items  Addressed This Visit       Encounter for general adult medical examination without abnormal findings - Primary    Adult Male  Complaints - 22 lb weight loss in 6 mo.  Diet - Improved - high protein, low fat  Activity - Walking, weights 3x/wk  Tobacco Use - None  EtOH Use - Rare  Drug Use - None   Sexual Activity -   PMH - MVR s/p replacement, HLD, PFO, Pulm HTN, ADHD, YAIR  FHX - D CAD DM; MGM CAD  Labs Ordered - CBC CMP LP Hep B Screening PSA  Immunizations Recommended - None   Preventative Care Recommended - Colonoscopy - scheduled 5/25  Refused - None  Follow up - 1 y         Relevant Orders    CBC and Differential    Comprehensive metabolic panel    Hepatitis B core antibody, total    Hepatitis B surface antibody    Hepatitis B surface antigen    Lipid panel    HIV 1,2 AB P24 AG    HEPATITIS C AB W/RFX TO HCV RNA,PCR     Other Visit Diagnoses         Screening for prostate cancer        Relevant Orders    PSA                Suresh Holland MD  4/7/2025

## 2025-04-07 NOTE — ASSESSMENT & PLAN NOTE
Adult Male  Complaints - 22 lb weight loss in 6 mo.  Diet - Improved - high protein, low fat  Activity - Walking, weights 3x/wk  Tobacco Use - None  EtOH Use - Rare  Drug Use - None   Sexual Activity -   PMH - MVR s/p replacement, HLD, PFO, Pulm HTN, ADHD, YAIR  FHX - D CAD DM; MGM CAD  Labs Ordered - CBC CMP LP Hep B Screening PSA  Immunizations Recommended - None   Preventative Care Recommended - Colonoscopy - scheduled 5/25  Refused - None  Follow up - 1 y

## 2025-04-10 ENCOUNTER — ANTICOAGULATION VISIT (OUTPATIENT)
Dept: CARDIOLOGY | Facility: CLINIC | Age: 50
End: 2025-04-10
Payer: COMMERCIAL

## 2025-04-10 DIAGNOSIS — Z95.2 HISTORY OF MITRAL VALVE REPLACEMENT WITH MECHANICAL VALVE: Primary | ICD-10-CM

## 2025-04-10 LAB — INR PPP: 4.8

## 2025-04-10 NOTE — PROGRESS NOTES
I called and spoke with patient.    He tells me that starting last Friday he had been on a diet that consisted of only canned tuna and canned sardines in hopes to drop weight before a cruise next Friday. Last night when he went to check his INR he got a reading of >8 and decided to go to [facility]'s ER.    INR at that time 9.8. Pt tells me he was given 2.5 mg Vit K and sent home.    Pt checked INR while on the phone with me and INR 4.8. Pt will hold again tonight. He would like to check tomr once again and discuss dosing moving forward.     Plan to have patient take coumadin 15 mg MWF, 10 mg AOD. Will discuss again tomr.

## 2025-04-15 LAB
ALBUMIN SERPL-MCNC: 4.3 G/DL (ref 3.6–5.1)
ALBUMIN/GLOB SERPL: 2 (CALC) (ref 1–2.5)
ALP SERPL-CCNC: 71 U/L (ref 35–144)
ALT SERPL-CCNC: 27 U/L (ref 9–46)
AST SERPL-CCNC: 24 U/L (ref 10–35)
ATRIAL RATE: 60
BASOPHILS # BLD AUTO: 42 CELLS/UL (ref 0–200)
BASOPHILS NFR BLD AUTO: 1 %
BILIRUB SERPL-MCNC: 1.5 MG/DL (ref 0.2–1.2)
BUN SERPL-MCNC: 19 MG/DL (ref 7–25)
BUN/CREAT SERPL: ABNORMAL (CALC) (ref 6–22)
CALCIUM SERPL-MCNC: 8.9 MG/DL (ref 8.6–10.3)
CHLORIDE SERPL-SCNC: 106 MMOL/L (ref 98–110)
CHOLEST SERPL-MCNC: 129 MG/DL
CHOLEST/HDLC SERPL: 3.5 (CALC)
CO2 SERPL-SCNC: 27 MMOL/L (ref 20–32)
CREAT SERPL-MCNC: 0.89 MG/DL (ref 0.7–1.3)
EGFRCR SERPLBLD CKD-EPI 2021: 104 ML/MIN/1.73M2
EOSINOPHIL # BLD AUTO: 181 CELLS/UL (ref 15–500)
EOSINOPHIL NFR BLD AUTO: 4.3 %
ERYTHROCYTE [DISTWIDTH] IN BLOOD BY AUTOMATED COUNT: 12.9 % (ref 11–15)
GLOBULIN SER CALC-MCNC: 2.1 G/DL (CALC) (ref 1.9–3.7)
GLUCOSE SERPL-MCNC: 103 MG/DL (ref 65–99)
HBV CORE AB SERPL QL IA: NORMAL
HBV SURFACE AB SER QL IA: NORMAL
HBV SURFACE AG SERPL QL IA: NORMAL
HCT VFR BLD AUTO: 41.1 % (ref 38.5–50)
HCV AB SERPL QL IA: NORMAL
HDLC SERPL-MCNC: 37 MG/DL
HGB BLD-MCNC: 13.2 G/DL (ref 13.2–17.1)
HIV 1+2 AB+HIV1 P24 AG SERPL QL IA: NORMAL
LDLC SERPL CALC-MCNC: 72 MG/DL (CALC)
LYMPHOCYTES # BLD AUTO: 1298 CELLS/UL (ref 850–3900)
LYMPHOCYTES NFR BLD AUTO: 30.9 %
MCH RBC QN AUTO: 29.8 PG (ref 27–33)
MCHC RBC AUTO-ENTMCNC: 32.1 G/DL (ref 32–36)
MCV RBC AUTO: 92.8 FL (ref 80–100)
MONOCYTES # BLD AUTO: 391 CELLS/UL (ref 200–950)
MONOCYTES NFR BLD AUTO: 9.3 %
NEUTROPHILS # BLD AUTO: 2289 CELLS/UL (ref 1500–7800)
NEUTROPHILS NFR BLD AUTO: 54.5 %
NONHDLC SERPL-MCNC: 92 MG/DL (CALC)
P AXIS: 72
PLATELET # BLD AUTO: 159 THOUSAND/UL (ref 140–400)
PMV BLD REES-ECKER: 11.3 FL (ref 7.5–12.5)
POTASSIUM SERPL-SCNC: 3.8 MMOL/L (ref 3.5–5.3)
PR INTERVAL: 198
PROT SERPL-MCNC: 6.4 G/DL (ref 6.1–8.1)
PSA SERPL-MCNC: 0.44 NG/ML
QRS DURATION: 86
QT INTERVAL: 472
QTC CALCULATION(BAZETT): 472
R AXIS: 1
RBC # BLD AUTO: 4.43 MILLION/UL (ref 4.2–5.8)
SODIUM SERPL-SCNC: 140 MMOL/L (ref 135–146)
T WAVE AXIS: 35
TRIGL SERPL-MCNC: 113 MG/DL
VENTRICULAR RATE: 60
WBC # BLD AUTO: 4.2 THOUSAND/UL (ref 3.8–10.8)

## 2025-04-16 LAB — INR PPP: 2.1

## 2025-04-17 ENCOUNTER — ANTICOAGULATION VISIT (OUTPATIENT)
Dept: CARDIOLOGY | Facility: CLINIC | Age: 50
End: 2025-04-17
Payer: COMMERCIAL

## 2025-04-17 DIAGNOSIS — Z95.2 HISTORY OF MITRAL VALVE REPLACEMENT WITH MECHANICAL VALVE: Primary | ICD-10-CM

## 2025-04-17 NOTE — PROGRESS NOTES
Received notification that pt checked his INR which was 2.1, and pt.'s goal is 2.5 to 3.5    Pt denies any abnormal bruising/bleeding, or medication changes.  Pt stated that he is back on his high protein diet.  Pt also informed me that he leaves for a cruise on Friday for a week.  Instructed pt to continue taking normal dose of Coumadin 15 mg every Monday, Wednesday, and Friday, and 10 mg all the other days.  Pt will recheck his INR in one week on 04/23/2025.  Pt verbally understands and has no further questions at this time.

## 2025-04-22 ENCOUNTER — RESULTS FOLLOW-UP (OUTPATIENT)
Dept: FAMILY MEDICINE | Facility: CLINIC | Age: 50
End: 2025-04-22

## 2025-04-25 ENCOUNTER — ANTICOAGULATION VISIT (OUTPATIENT)
Dept: CARDIOLOGY | Facility: CLINIC | Age: 50
End: 2025-04-25
Payer: COMMERCIAL

## 2025-04-25 DIAGNOSIS — Z95.2 HISTORY OF MITRAL VALVE REPLACEMENT WITH MECHANICAL VALVE: Primary | ICD-10-CM

## 2025-04-25 LAB — INR PPP: 2.5

## 2025-04-25 NOTE — PROGRESS NOTES
Received notification that pt checked his INR, which was 2.5 and pt.'s goal is 2.5 to 3.5.    Pt denies any abnormal bruising/bleeding, changes in medication or diet.  Pt states that he is still on the cruise.  Pt did state that on Monday 04/21/2025, he took 10 mg of Coumadin instead of 15 mg due to pt planning on drinking.  Told pt to continue taking 15 mg of Coumadin every Monday, Wednesday, and Friday, and 10 mg all the other days.  Asked pt to check his INR in two weeks on 05/08/2025.  Told pt to call in the interim if he has any questions or concerns.  Pt verbally understands and has no further questions at this time.

## 2025-05-06 ENCOUNTER — OFFICE VISIT (OUTPATIENT)
Dept: CARDIOLOGY | Facility: CLINIC | Age: 50
End: 2025-05-06
Payer: COMMERCIAL

## 2025-05-06 VITALS
HEART RATE: 62 BPM | BODY MASS INDEX: 33.05 KG/M2 | DIASTOLIC BLOOD PRESSURE: 82 MMHG | OXYGEN SATURATION: 97 % | SYSTOLIC BLOOD PRESSURE: 112 MMHG | HEIGHT: 72 IN | WEIGHT: 244 LBS

## 2025-05-06 DIAGNOSIS — Z01.810 PREOPERATIVE CARDIOVASCULAR EXAMINATION: ICD-10-CM

## 2025-05-06 DIAGNOSIS — E78.2 MIXED HYPERLIPIDEMIA: Primary | ICD-10-CM

## 2025-05-06 DIAGNOSIS — Z95.2 HISTORY OF MITRAL VALVE REPLACEMENT WITH MECHANICAL VALVE: ICD-10-CM

## 2025-05-06 DIAGNOSIS — R07.89 ATYPICAL CHEST PAIN: ICD-10-CM

## 2025-05-06 LAB
ATRIAL RATE: 62
P AXIS: 64
PR INTERVAL: 198
QRS DURATION: 92
QT INTERVAL: 450
QTC CALCULATION(BAZETT): 456
R AXIS: -25
T WAVE AXIS: 40
VENTRICULAR RATE: 62

## 2025-05-06 PROCEDURE — 93000 ELECTROCARDIOGRAM COMPLETE: CPT | Performed by: INTERNAL MEDICINE

## 2025-05-06 PROCEDURE — 3008F BODY MASS INDEX DOCD: CPT | Performed by: INTERNAL MEDICINE

## 2025-05-06 PROCEDURE — 99214 OFFICE O/P EST MOD 30 MIN: CPT | Performed by: INTERNAL MEDICINE

## 2025-05-06 RX ORDER — CLINDAMYCIN HYDROCHLORIDE 150 MG/1
150 CAPSULE ORAL SEE ADMIN INSTRUCTIONS
Qty: 4 CAPSULE | Refills: 5 | Status: SHIPPED | OUTPATIENT
Start: 2025-05-06 | End: 2025-05-16

## 2025-05-07 LAB — INR PPP: 3.9

## 2025-05-08 ENCOUNTER — ANTICOAGULATION VISIT (OUTPATIENT)
Dept: CARDIOLOGY | Facility: CLINIC | Age: 50
End: 2025-05-08
Payer: COMMERCIAL

## 2025-05-08 DIAGNOSIS — Z95.2 HISTORY OF MITRAL VALVE REPLACEMENT WITH MECHANICAL VALVE: Primary | ICD-10-CM

## 2025-05-23 ENCOUNTER — ANTICOAGULATION VISIT (OUTPATIENT)
Dept: CARDIOLOGY | Facility: CLINIC | Age: 50
End: 2025-05-23
Payer: COMMERCIAL

## 2025-05-23 DIAGNOSIS — Z95.2 HISTORY OF MITRAL VALVE REPLACEMENT WITH MECHANICAL VALVE: Primary | ICD-10-CM

## 2025-05-23 LAB — INR PPP: 2.8

## 2025-05-27 ENCOUNTER — TELEPHONE (OUTPATIENT)
Dept: CARDIOLOGY | Facility: CLINIC | Age: 50
End: 2025-05-27
Payer: COMMERCIAL

## 2025-05-28 ENCOUNTER — TELEPHONE (OUTPATIENT)
Dept: SCHEDULING | Facility: CLINIC | Age: 50
End: 2025-05-28
Payer: COMMERCIAL

## 2025-05-28 DIAGNOSIS — Z95.2 HISTORY OF MITRAL VALVE REPLACEMENT WITH MECHANICAL VALVE: Primary | ICD-10-CM

## 2025-05-28 DIAGNOSIS — Z95.2 HISTORY OF MITRAL VALVE REPLACEMENT WITH MECHANICAL VALVE: ICD-10-CM

## 2025-05-28 DIAGNOSIS — Z01.810 PREOPERATIVE CARDIOVASCULAR EXAMINATION: ICD-10-CM

## 2025-05-28 RX ORDER — ENOXAPARIN SODIUM 100 MG/ML
111 INJECTION SUBCUTANEOUS
Qty: 30 ML | Refills: 1 | Status: CANCELLED | OUTPATIENT
Start: 2025-05-28

## 2025-05-28 RX ORDER — ENOXAPARIN SODIUM 150 MG/ML
120 INJECTION SUBCUTANEOUS
Qty: 25 ML | Refills: 1 | Status: SHIPPED | OUTPATIENT
Start: 2025-05-28

## 2025-05-28 RX ORDER — ENOXAPARIN SODIUM 150 MG/ML
111 INJECTION SUBCUTANEOUS
Qty: 22.2 ML | Refills: 1 | Status: SHIPPED | OUTPATIENT
Start: 2025-05-28 | End: 2025-05-28

## 2025-05-29 LAB — INR PPP: 2.5

## 2025-05-30 ENCOUNTER — ANTICOAGULATION VISIT (OUTPATIENT)
Dept: CARDIOLOGY | Facility: CLINIC | Age: 50
End: 2025-05-30
Payer: COMMERCIAL

## 2025-05-30 DIAGNOSIS — Z95.2 HISTORY OF MITRAL VALVE REPLACEMENT WITH MECHANICAL VALVE: Primary | ICD-10-CM

## 2025-06-06 ENCOUNTER — ANTICOAGULATION VISIT (OUTPATIENT)
Dept: CARDIOLOGY | Facility: CLINIC | Age: 50
End: 2025-06-06
Payer: COMMERCIAL

## 2025-06-06 DIAGNOSIS — Z95.2 HISTORY OF MITRAL VALVE REPLACEMENT WITH MECHANICAL VALVE: Primary | ICD-10-CM

## 2025-06-06 LAB — INR PPP: 1.5

## 2025-06-10 ENCOUNTER — ANTICOAGULATION VISIT (OUTPATIENT)
Dept: CARDIOLOGY | Facility: CLINIC | Age: 50
End: 2025-06-10
Payer: COMMERCIAL

## 2025-06-10 DIAGNOSIS — Z95.2 HISTORY OF MITRAL VALVE REPLACEMENT WITH MECHANICAL VALVE: Primary | ICD-10-CM

## 2025-06-10 LAB — INR PPP: 1

## 2025-06-10 NOTE — PROGRESS NOTES
Received notification from [employer] that pt checked his INR yesterday which was 1.0, and goal is 2.5 to 3.5    Pt had Colonoscopy yesterday 6/9/25.  Pt took his normal dose of Coumadin last night which was 15 mg.  Pt also resumed taking Lovenox as prescribed.  Pt aware to continue with his normal dose of Coumadin regimen which is 15 mg every M/W/F and 10 mg all the other days.  Pt also aware that he should take Lovenox 0.8 ml sq BID and check his INR until in normal range of 2.5 to 3.5.  Pt aware to call in the interim if he has any questions or concerns.  Pt verbally understands and has no further questions at this time.

## 2025-06-11 ENCOUNTER — ANTICOAGULATION VISIT (OUTPATIENT)
Dept: CARDIOLOGY | Facility: CLINIC | Age: 50
End: 2025-06-11
Payer: COMMERCIAL

## 2025-06-11 DIAGNOSIS — Z95.2 HISTORY OF MITRAL VALVE REPLACEMENT WITH MECHANICAL VALVE: Primary | ICD-10-CM

## 2025-06-11 LAB — INR PPP: 1.1

## 2025-06-11 NOTE — PROGRESS NOTES
Received notification that pt checked his INR today which was 1.1 and pt.'s goal is 2.5 to 3.5    Pt is currently Bridging with Lovenox due to holding Coumadin for his Colonoscopy on Monday 6/9/25.    Left a detail message with pt to continue taking his Lovenox 0.80 mg SQ BID until INR is at least 2.5, along with his normal Coumadin Regimen of 15 mg every M/W/F, and 10 mg all the other days.  Also reminded pt to continue to check his INR daily until level is 2.5.  Told pt to call in the interim if he has any questions or concerns.

## 2025-06-12 ENCOUNTER — ANTICOAGULATION VISIT (OUTPATIENT)
Dept: CARDIOLOGY | Facility: CLINIC | Age: 50
End: 2025-06-12
Payer: COMMERCIAL

## 2025-06-12 DIAGNOSIS — Z95.2 HISTORY OF MITRAL VALVE REPLACEMENT WITH MECHANICAL VALVE: Primary | ICD-10-CM

## 2025-06-12 LAB — INR PPP: 1.3

## 2025-06-12 NOTE — PROGRESS NOTES
Received notification that pt checked his INR today which was 1.3 and pt.'s goal is 2.5 to 3.5     Pt is currently Bridging with Lovenox due to holding Coumadin for his Colonoscopy on Monday 6/9/25.     Spoke with pt to continue taking his Lovenox 0.80 mg SQ BID until INR is at least 2.5, along with his normal Coumadin Regimen of 15 mg every M/W/F, and 10 mg all the other days.  Also reminded pt to continue to check his INR daily until level is 2.5.  Told pt to call in the interim if he has any questions or concerns.  Pt informed me that he took 15 mg yesterday instead of 10 mg.  Pt verbally understands above information and has no further questions at this time.

## 2025-06-13 ENCOUNTER — ANTICOAGULATION VISIT (OUTPATIENT)
Dept: CARDIOLOGY | Facility: CLINIC | Age: 50
End: 2025-06-13
Payer: COMMERCIAL

## 2025-06-13 DIAGNOSIS — Z95.2 HISTORY OF MITRAL VALVE REPLACEMENT WITH MECHANICAL VALVE: Primary | ICD-10-CM

## 2025-06-13 LAB — INR PPP: 1.6

## 2025-06-13 NOTE — PROGRESS NOTES
Received notification that pt checked his INR today which was 1.6 and pt.'s goal is 2.5 to 3.5     Pt is currently Bridging with Lovenox due to holding Coumadin for his Colonoscopy on Monday 6/9/25.     Spoke with pt to continue taking his Lovenox 0.80 mg SQ BID until INR is at least 2.5, along with his normal Coumadin Regimen of 15 mg every M/W/F, and 10 mg all the other days.  Also reminded pt to continue to check his INR daily until level is 2.5.  Told pt to call in the interim if he has any questions or concerns.  Pt informed me that he took 15 mg yesterday instead of 10 mg.  Pt verbally understands above information and has no further questions at this time.

## 2025-06-17 ENCOUNTER — ANTICOAGULATION VISIT (OUTPATIENT)
Dept: CARDIOLOGY | Facility: CLINIC | Age: 50
End: 2025-06-17
Payer: COMMERCIAL

## 2025-06-17 DIAGNOSIS — Z95.2 HISTORY OF MITRAL VALVE REPLACEMENT WITH MECHANICAL VALVE: Primary | ICD-10-CM

## 2025-06-17 LAB — INR PPP: 2.6

## 2025-06-17 NOTE — PROGRESS NOTES
Received notification that pt checked his INR today which was 2.6 and pt.'s goal is 2.5 to 3.5     Pt was Bridging with Lovenox due to holding Coumadin for his Colonoscopy on Monday 6/9/25.     Spoke with pt who denies any abnormal bruising/bleeding, changes in diet.  Told pt to continue with taking 15 mg of Coumadin Monday, Wednesday, and Friday, and 10 mg all the other days.  Asked pt to recheck his INR in two weeks (7/1/2025).  Also told pt to call in the interim if he has any questions or concerns.  Pt verbally understands above information and has no further questions at this time.

## 2025-06-25 DIAGNOSIS — I27.20 PULMONARY HYPERTENSION (CMS/HCC): ICD-10-CM

## 2025-06-26 ENCOUNTER — ANTICOAGULATION VISIT (OUTPATIENT)
Dept: CARDIOLOGY | Facility: CLINIC | Age: 50
End: 2025-06-26
Payer: COMMERCIAL

## 2025-06-26 DIAGNOSIS — Z95.2 HISTORY OF MITRAL VALVE REPLACEMENT WITH MECHANICAL VALVE: Primary | ICD-10-CM

## 2025-06-27 LAB — INR PPP: 4

## 2025-06-27 RX ORDER — METOPROLOL SUCCINATE 25 MG/1
25 TABLET, EXTENDED RELEASE ORAL DAILY
Qty: 90 TABLET | Refills: 3 | Status: SHIPPED | OUTPATIENT
Start: 2025-06-27

## 2025-06-30 ENCOUNTER — ANTICOAGULATION VISIT (OUTPATIENT)
Dept: CARDIOLOGY | Facility: CLINIC | Age: 50
End: 2025-06-30
Payer: COMMERCIAL

## 2025-06-30 DIAGNOSIS — Z95.2 HISTORY OF MITRAL VALVE REPLACEMENT WITH MECHANICAL VALVE: Primary | ICD-10-CM

## 2025-06-30 NOTE — PROGRESS NOTES
Received notification today that pt checked his INR Friday (06/27/25) which was 4.0, and pt.'s goal is 2.5 to 3.5    Spoke with pt, who denies any abnormal bruising/bleeding, changes in medication or diet.  Pt stated that he held his dose on 06/27/2025.  Pt will continue taking 15 mg every M/W/F and 10 mg all the other days.  Pt will recheck his INR on 07/02/2025 and portal message us results.  Pt verbally understands and has no further questions at this time.

## 2025-07-03 ENCOUNTER — OFFICE VISIT (OUTPATIENT)
Dept: FAMILY MEDICINE | Facility: CLINIC | Age: 50
End: 2025-07-03
Payer: COMMERCIAL

## 2025-07-03 VITALS
HEIGHT: 72 IN | HEART RATE: 66 BPM | OXYGEN SATURATION: 96 % | WEIGHT: 252.2 LBS | DIASTOLIC BLOOD PRESSURE: 82 MMHG | TEMPERATURE: 97.6 F | SYSTOLIC BLOOD PRESSURE: 120 MMHG | BODY MASS INDEX: 34.16 KG/M2

## 2025-07-03 DIAGNOSIS — J06.9 ACUTE URI: Primary | ICD-10-CM

## 2025-07-03 DIAGNOSIS — R07.9 CHEST PAIN, UNSPECIFIED TYPE: ICD-10-CM

## 2025-07-03 LAB — INR PPP: 2.5

## 2025-07-03 PROCEDURE — 93000 ELECTROCARDIOGRAM COMPLETE: CPT | Performed by: FAMILY MEDICINE

## 2025-07-03 PROCEDURE — 3008F BODY MASS INDEX DOCD: CPT | Performed by: FAMILY MEDICINE

## 2025-07-03 PROCEDURE — 99213 OFFICE O/P EST LOW 20 MIN: CPT | Mod: 25 | Performed by: FAMILY MEDICINE

## 2025-07-03 ASSESSMENT — ENCOUNTER SYMPTOMS
COUGH: 1
NAUSEA: 0
SORE THROAT: 0
DIARRHEA: 0
FEVER: 0
WHEEZING: 0
CHILLS: 0
SHORTNESS OF BREATH: 0
FATIGUE: 0
PALPITATIONS: 0
RHINORRHEA: 0

## 2025-07-03 NOTE — PROGRESS NOTES
"   [address] [facility]  [phone]       Reason for visit:   Chief Complaint   Patient presents with    Illness      HPI   Samuel Koehler is a 50 y.o. male who presents with cold symptoms   - Cold Symptoms  Noticed it about 5 d ago  Fatigue  Cough developed afterwards  Feels like he needs to bring up mucous  But he is not bringing up much  Having pain after coughing - anterior chest  Pain lasts for a \"little bit\"  No limitations to activity - has no pain or SOB/BURNS with activity  Unsure if he is wheezing  Does not feel SOB  No F/C  Mild congestion and runny nose - resolved  No sore throat  Took Zyrtec this AM but otherwise he has not taken anything else OTC     Past Medical History:   Diagnosis Date    ADHD 10/04/2019    COVID         Lipid disorder     Mitral valvular regurgitation 2023    Mixed hyperlipidemia 2023    Lipid profile.  2023  Total cholesterol 131.  Triglycerides 65.  HDL 53  LDL 64      YAIR (obstructive sleep apnea) 2024    PFO (patent foramen ovale) 2023    Pulmonary hypertension (CMS/HCC) 2023     Past Surgical History   Procedure Laterality Date    Cataract extraction Bilateral         Knee arthroscopy w/ meniscectomy Right     Liver surgery      Mitral valve replacement Left 2023    left atrial appendage clip placement      Social History     Socioeconomic History    Marital status:      Spouse name: Not on file    Number of children: Not on file    Years of education: Not on file    Highest education level: Not on file   Occupational History    Not on file   Tobacco Use    Smoking status: Former     Current packs/day: 0.00     Average packs/day: 1 pack/day for 15.0 years (15.0 ttl pk-yrs)     Types: Cigarettes     Start date: 2004     Quit date: 2019     Years since quittin.7    Smokeless tobacco: Never   Vaping Use    Vaping status: Never " Used   Substance and Sexual Activity    Alcohol use: Yes     Comment: occ    Drug use: No    Sexual activity: Yes     Partners: Female   Other Topics Concern    Not on file   Social History Narrative    Do you wear your seatbelt? Yes    Do you have smoke detector in your home? Yes    Do you have a carbon monoxide detector in your home? 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      Social Drivers of Health     Financial Resource Strain: Not on file   Food Insecurity: No Food Insecurity (3/31/2025)    Hunger Vital Sign     Worried About Running Out of Food in the Last Year: Never true     Ran Out of Food in the Last Year: Never true   Transportation Needs: No Transportation Needs (3/31/2025)    PRAPARE - Transportation     Lack of Transportation (Medical): No     Lack of Transportation (Non-Medical): No   Physical Activity: Not on file   Stress: Not on file   Social Connections: Not on file   Intimate Partner Violence: Not on file   Housing Stability: Unknown (3/31/2025)    Housing Stability Vital Sign     Unable to Pay for Housing in the Last Year: No     Number of Times Moved in the Last Year: Not on file     Homeless in the Last Year: No     Family History   Problem Relation Name Age of Onset    Heart disease Biological Father      Diabetes Biological Father      Heart disease Paternal Grandmother       Penicillins  Current Outpatient Medications   Medication Sig Dispense Refill    atorvastatin (LIPITOR) 40 mg tablet Take 1 tablet (40 mg total) by mouth daily. 90 tablet 3    metoprolol succinate XL (TOPROL-XL) 25 mg 24 hr tablet Take 1 tablet (25 mg total) by mouth daily. 90 tablet 3    omeprazole (PriLOSEC) 40 mg capsule TAKE 1 CAPSULE(40 MG) BY MOUTH DAILY BEFORE BREAKFAST 90 capsule 1    warfarin (COUMADIN) 10 mg tablet TAKE 10 MG DAILY THREE TIMES WEEKLY, AND 15 MG DAILY 4 TIMES WEEKLY OR AS DIRECTED BY OFFICE BASED ON  tablet 3     No current  facility-administered medications for this visit.       Review of Systems   Constitutional:  Negative for chills, fatigue and fever.   HENT:  Negative for congestion, postnasal drip, rhinorrhea and sore throat.    Respiratory:  Positive for cough. Negative for shortness of breath and wheezing.    Cardiovascular:  Positive for chest pain. Negative for palpitations and leg swelling.   Gastrointestinal:  Negative for diarrhea and nausea.     Objective   Vitals:    07/03/25 1059   BP: 120/82   BP Location: Left upper arm   Patient Position: Sitting   Pulse: 66   Temp: 36.4 °C (97.6 °F)   TempSrc: Oral   SpO2: 96%   Weight: 114 kg (252 lb 3.2 oz)   Height: 1.829 m (6')       Physical Exam  Vitals and nursing note reviewed.   Constitutional:       General: He is not in acute distress.     Appearance: Normal appearance. He is well-developed. He is not ill-appearing.   HENT:      Right Ear: Tympanic membrane and ear canal normal.      Left Ear: Tympanic membrane and ear canal normal.      Nose: No mucosal edema or rhinorrhea.      Right Sinus: No maxillary sinus tenderness.      Left Sinus: No maxillary sinus tenderness.      Mouth/Throat:      Pharynx: No oropharyngeal exudate or posterior oropharyngeal erythema.      Tonsils: No tonsillar exudate. 1+ on the right. 1+ on the left.   Eyes:      General:         Right eye: No discharge.         Left eye: No discharge.      Conjunctiva/sclera: Conjunctivae normal.   Cardiovascular:      Rate and Rhythm: Normal rate and regular rhythm.      Heart sounds: Normal heart sounds. No murmur heard.     No friction rub. No gallop.   Pulmonary:      Effort: Pulmonary effort is normal. No respiratory distress.      Breath sounds: Normal breath sounds. No stridor. No decreased breath sounds, wheezing, rhonchi or rales.   Musculoskeletal:      Cervical back: Normal range of motion.      Right lower leg: No edema.      Left lower leg: No edema.   Skin:     Capillary Refill: Capillary  refill takes less than 2 seconds.   Neurological:      Mental Status: He is alert and oriented to person, place, and time.         Procedures    Lab Results   Component Value Date    WBC 4.2 04/14/2025    HGB 13.2 04/14/2025    HCT 41.1 04/14/2025     04/14/2025    CHOL 129 04/14/2025    TRIG 113 04/14/2025    HDL 37 (L) 04/14/2025    ALT 27 04/14/2025    AST 24 04/14/2025     04/14/2025    K 3.8 04/14/2025     04/14/2025    CREATININE 0.89 04/14/2025    BUN 19 04/14/2025    CO2 27 04/14/2025    TSH 1.230 12/18/2015    PSA 0.44 04/14/2025    INR 4.0 06/27/2025    HGBA1C 5.4 12/18/2015         Assessment   Problem List Items Addressed This Visit    None  Visit Diagnoses         Acute URI    -  Primary    New proble  x 5 d  Nonprod cough  No F/C  Congesition resolved  No SOB sore throat  Notes CP w coughing  Normal URI exam  Heart baseline - Hx of MV repair      Chest pain, unspecified type        New Problem  EKG NSR No ST changes    Relevant Orders    ECG 12 lead (Completed)                Suresh Holland MD  7/3/2025

## 2025-07-03 NOTE — PATIENT INSTRUCTIONS
Check with Cardiology about OTC cough medication you can take  Hydrate well  Let me know if any symptoms change  Let me know if any fevers or chills, shortness of breath  ER if chest pain worsens

## 2025-07-07 ENCOUNTER — ANTICOAGULATION VISIT (OUTPATIENT)
Dept: CARDIOLOGY | Facility: CLINIC | Age: 50
End: 2025-07-07
Payer: COMMERCIAL

## 2025-07-07 DIAGNOSIS — Z95.2 HISTORY OF MITRAL VALVE REPLACEMENT WITH MECHANICAL VALVE: Primary | ICD-10-CM

## 2025-07-07 NOTE — PROGRESS NOTES
I called and spoke with patient. He feels well without complaints. INR 2.5. Pt will continue on coumadin 15 mg MWF, 10 mg AOD and repeat INR on 7/17/2025. He will call in the interim with q/c.

## 2025-07-16 ENCOUNTER — ANTICOAGULATION VISIT (OUTPATIENT)
Dept: CARDIOLOGY | Facility: CLINIC | Age: 50
End: 2025-07-16
Payer: COMMERCIAL

## 2025-07-16 DIAGNOSIS — Z95.2 HISTORY OF MITRAL VALVE REPLACEMENT WITH MECHANICAL VALVE: Primary | ICD-10-CM

## 2025-07-16 LAB — INTERNATIONAL NORMALIZATION RATIO, POC: 2.7

## 2025-07-16 PROCEDURE — 85610 PROTHROMBIN TIME: CPT | Performed by: NURSE PRACTITIONER

## 2025-07-17 ENCOUNTER — DOCUMENTATION (OUTPATIENT)
Dept: FAMILY MEDICINE | Facility: CLINIC | Age: 50
End: 2025-07-17
Payer: COMMERCIAL

## 2025-07-17 NOTE — PROGRESS NOTES
Lizabeth Rivera MA has completed a chart review for Samuel Koehler and have determined that the care gap has been satisfied.    Care Gap Source: [facility] Louie    Care Gap(s) Identified: Colorectal Cancer Screening    Chart Review Completed: Yes      Colonoscopy completed 6/9/2025.

## 2025-08-03 DIAGNOSIS — E78.2 MIXED HYPERLIPIDEMIA: ICD-10-CM

## 2025-08-04 RX ORDER — ATORVASTATIN CALCIUM 40 MG/1
40 TABLET, FILM COATED ORAL DAILY
Qty: 90 TABLET | Refills: 3 | Status: SHIPPED | OUTPATIENT
Start: 2025-08-04

## 2025-08-13 ENCOUNTER — ANTICOAGULATION VISIT (OUTPATIENT)
Dept: CARDIOLOGY | Facility: CLINIC | Age: 50
End: 2025-08-13
Payer: COMMERCIAL

## 2025-08-13 DIAGNOSIS — Z95.2 HISTORY OF MITRAL VALVE REPLACEMENT WITH MECHANICAL VALVE: Primary | ICD-10-CM

## 2025-08-13 LAB — INR PPP: 2.8

## 2025-08-18 LAB — INR PPP: 3.4

## 2025-08-19 ENCOUNTER — ANTICOAGULATION VISIT (OUTPATIENT)
Dept: CARDIOLOGY | Facility: CLINIC | Age: 50
End: 2025-08-19
Payer: COMMERCIAL

## 2025-08-19 DIAGNOSIS — Z95.2 HISTORY OF MITRAL VALVE REPLACEMENT WITH MECHANICAL VALVE: Primary | ICD-10-CM

## 2025-08-22 DIAGNOSIS — E78.2 MIXED HYPERLIPIDEMIA: ICD-10-CM

## 2025-08-22 RX ORDER — ATORVASTATIN CALCIUM 40 MG/1
40 TABLET, FILM COATED ORAL DAILY
Qty: 90 TABLET | Refills: 3 | Status: SHIPPED | OUTPATIENT
Start: 2025-08-22

## 2025-08-25 DIAGNOSIS — Z95.2 HISTORY OF MITRAL VALVE REPLACEMENT WITH MECHANICAL VALVE: Primary | ICD-10-CM

## 2025-08-28 ENCOUNTER — ANTICOAGULATION VISIT (OUTPATIENT)
Dept: CARDIOLOGY | Facility: CLINIC | Age: 50
End: 2025-08-28
Payer: COMMERCIAL

## 2025-08-28 DIAGNOSIS — Z95.2 HISTORY OF MITRAL VALVE REPLACEMENT WITH MECHANICAL VALVE: Primary | ICD-10-CM

## 2025-08-28 LAB — INR PPP: 2.5

## 2025-09-04 ENCOUNTER — HOSPITAL ENCOUNTER (EMERGENCY)
Facility: HOSPITAL | Age: 50
Discharge: HOME | End: 2025-09-04
Attending: EMERGENCY MEDICINE
Payer: COMMERCIAL

## 2025-09-04 VITALS
BODY MASS INDEX: 33.86 KG/M2 | RESPIRATION RATE: 18 BRPM | OXYGEN SATURATION: 96 % | HEIGHT: 72 IN | HEART RATE: 64 BPM | SYSTOLIC BLOOD PRESSURE: 125 MMHG | TEMPERATURE: 97.4 F | WEIGHT: 250 LBS | DIASTOLIC BLOOD PRESSURE: 66 MMHG

## 2025-09-04 DIAGNOSIS — R42 VERTIGO: Primary | ICD-10-CM

## 2025-09-04 LAB
ANION GAP SERPL CALC-SCNC: 7 MEQ/L (ref 3–15)
APTT PPP: 39 SEC (ref 23–35)
BASOPHILS # BLD: 0.04 K/UL (ref 0.01–0.1)
BASOPHILS NFR BLD: 0.7 %
BUN SERPL-MCNC: 19 MG/DL (ref 7–25)
CALCIUM SERPL-MCNC: 9.3 MG/DL (ref 8.6–10.3)
CHLORIDE SERPL-SCNC: 108 MEQ/L (ref 98–107)
CO2 SERPL-SCNC: 25 MEQ/L (ref 21–31)
CREAT SERPL-MCNC: 1 MG/DL (ref 0.7–1.3)
DIFFERENTIAL METHOD BLD: ABNORMAL
EGFRCR SERPLBLD CKD-EPI 2021: >60 ML/MIN/1.73M*2
EOSINOPHIL # BLD: 0.16 K/UL (ref 0.04–0.54)
EOSINOPHIL NFR BLD: 2.9 %
ERYTHROCYTE [DISTWIDTH] IN BLOOD BY AUTOMATED COUNT: 13.2 % (ref 11.6–14.4)
GLUCOSE SERPL-MCNC: 101 MG/DL (ref 70–99)
HCT VFR BLD AUTO: 41.1 % (ref 40.1–51)
HGB BLD-MCNC: 13.6 G/DL (ref 13.7–17.5)
IMM GRANULOCYTES # BLD AUTO: 0.03 K/UL (ref 0–0.08)
IMM GRANULOCYTES NFR BLD AUTO: 0.5 %
INR PPP: 2.4
LYMPHOCYTES # BLD: 1.31 K/UL (ref 1.2–3.5)
LYMPHOCYTES NFR BLD: 23.4 %
MCH RBC QN AUTO: 30.4 PG (ref 28–33.2)
MCHC RBC AUTO-ENTMCNC: 33.1 G/DL (ref 32.2–36.5)
MCV RBC AUTO: 91.7 FL (ref 83–98)
MONOCYTES # BLD: 0.49 K/UL (ref 0.3–1)
MONOCYTES NFR BLD: 8.8 %
NEUTROPHILS # BLD: 3.56 K/UL (ref 1.7–7)
NEUTS SEG NFR BLD: 63.7 %
NRBC BLD-RTO: 0 %
PLATELET # BLD AUTO: 213 K/UL (ref 150–350)
PMV BLD AUTO: 9.6 FL (ref 9.4–12.4)
POTASSIUM SERPL-SCNC: 4.1 MEQ/L (ref 3.5–5.1)
PROTHROMBIN TIME: 25.8 SEC (ref 12.2–14.5)
RBC # BLD AUTO: 4.48 M/UL (ref 4.5–5.8)
SODIUM SERPL-SCNC: 140 MEQ/L (ref 136–145)
TROPONIN I SERPL HS-MCNC: <2 PG/ML
WBC # BLD AUTO: 5.59 K/UL (ref 3.8–10.5)

## 2025-09-04 PROCEDURE — 36415 COLL VENOUS BLD VENIPUNCTURE: CPT

## 2025-09-04 PROCEDURE — 93005 ELECTROCARDIOGRAM TRACING: CPT | Performed by: EMERGENCY MEDICINE

## 2025-09-04 PROCEDURE — 85730 THROMBOPLASTIN TIME PARTIAL: CPT | Performed by: EMERGENCY MEDICINE

## 2025-09-04 PROCEDURE — 85025 COMPLETE CBC W/AUTO DIFF WBC: CPT | Performed by: EMERGENCY MEDICINE

## 2025-09-04 PROCEDURE — 93005 ELECTROCARDIOGRAM TRACING: CPT

## 2025-09-04 PROCEDURE — 80048 BASIC METABOLIC PNL TOTAL CA: CPT | Performed by: EMERGENCY MEDICINE

## 2025-09-04 PROCEDURE — 85610 PROTHROMBIN TIME: CPT | Performed by: EMERGENCY MEDICINE

## 2025-09-04 PROCEDURE — 85025 COMPLETE CBC W/AUTO DIFF WBC: CPT

## 2025-09-04 PROCEDURE — 84484 ASSAY OF TROPONIN QUANT: CPT | Performed by: EMERGENCY MEDICINE

## 2025-09-04 RX ORDER — MECLIZINE HYDROCHLORIDE 25 MG/1
25 TABLET ORAL 3 TIMES DAILY PRN
Qty: 30 TABLET | Refills: 0 | Status: SHIPPED | OUTPATIENT
Start: 2025-09-04 | End: 2025-10-04

## 2025-09-04 ASSESSMENT — ENCOUNTER SYMPTOMS: DIZZINESS: 1

## 2025-09-05 LAB
ATRIAL RATE: 75
P AXIS: 39
PR INTERVAL: 168
QRS DURATION: 82
QT INTERVAL: 416
QTC CALCULATION(BAZETT): 464
R AXIS: -19
T WAVE AXIS: 40
VENTRICULAR RATE: 75